# Patient Record
Sex: MALE | Race: BLACK OR AFRICAN AMERICAN | NOT HISPANIC OR LATINO | Employment: UNEMPLOYED | URBAN - METROPOLITAN AREA
[De-identification: names, ages, dates, MRNs, and addresses within clinical notes are randomized per-mention and may not be internally consistent; named-entity substitution may affect disease eponyms.]

---

## 2020-05-04 ENCOUNTER — HOSPITAL ENCOUNTER (INPATIENT)
Facility: HOSPITAL | Age: 60
LOS: 9 days | Discharge: HOME | DRG: 643 | End: 2020-05-13
Attending: EMERGENCY MEDICINE | Admitting: INTERNAL MEDICINE
Payer: MEDICARE

## 2020-05-04 DIAGNOSIS — R53.81 MALAISE: ICD-10-CM

## 2020-05-04 DIAGNOSIS — Z72.0 TOBACCO ABUSE: ICD-10-CM

## 2020-05-04 DIAGNOSIS — R00.1 BRADYCARDIA: ICD-10-CM

## 2020-05-04 DIAGNOSIS — F41.9 ANXIETY DISORDER, UNSPECIFIED TYPE: ICD-10-CM

## 2020-05-04 DIAGNOSIS — G93.41 ENCEPHALOPATHY, METABOLIC: ICD-10-CM

## 2020-05-04 DIAGNOSIS — R42 DIZZINESS: ICD-10-CM

## 2020-05-04 DIAGNOSIS — I10 HYPERTENSION, ESSENTIAL: ICD-10-CM

## 2020-05-04 DIAGNOSIS — R07.9 CHEST PAIN: ICD-10-CM

## 2020-05-04 DIAGNOSIS — F41.0 ANXIETY ATTACK: ICD-10-CM

## 2020-05-04 DIAGNOSIS — E87.1 HYPONATREMIA: Primary | ICD-10-CM

## 2020-05-04 LAB
ALBUMIN SERPL BCP-MCNC: 4.3 G/DL (ref 3.5–5.2)
ALP SERPL-CCNC: 95 U/L (ref 55–135)
ALT SERPL W/O P-5'-P-CCNC: 27 U/L (ref 10–44)
ANION GAP SERPL CALC-SCNC: 13 MMOL/L (ref 8–16)
AST SERPL-CCNC: 21 U/L (ref 10–40)
BASOPHILS # BLD AUTO: 0.05 K/UL (ref 0–0.2)
BASOPHILS NFR BLD: 0.5 % (ref 0–1.9)
BILIRUB SERPL-MCNC: 1.1 MG/DL (ref 0.1–1)
BILIRUB UR QL STRIP: NEGATIVE
BILIRUB UR QL STRIP: NEGATIVE
BNP SERPL-MCNC: 44 PG/ML (ref 0–99)
BUN SERPL-MCNC: 11 MG/DL (ref 6–20)
CALCIUM SERPL-MCNC: 9.3 MG/DL (ref 8.7–10.5)
CHLORIDE SERPL-SCNC: 88 MMOL/L (ref 95–110)
CLARITY UR: CLEAR
CLARITY UR: CLEAR
CO2 SERPL-SCNC: 20 MMOL/L (ref 23–29)
COLOR UR: YELLOW
COLOR UR: YELLOW
CREAT SERPL-MCNC: 1 MG/DL (ref 0.5–1.4)
D DIMER PPP IA.FEU-MCNC: 0.35 MG/L FEU
DIFFERENTIAL METHOD: ABNORMAL
EOSINOPHIL # BLD AUTO: 0.1 K/UL (ref 0–0.5)
EOSINOPHIL NFR BLD: 1.2 % (ref 0–8)
ERYTHROCYTE [DISTWIDTH] IN BLOOD BY AUTOMATED COUNT: 11.5 % (ref 11.5–14.5)
EST. GFR  (AFRICAN AMERICAN): >60 ML/MIN/1.73 M^2
EST. GFR  (NON AFRICAN AMERICAN): >60 ML/MIN/1.73 M^2
GLUCOSE SERPL-MCNC: 115 MG/DL (ref 70–110)
GLUCOSE UR QL STRIP: NEGATIVE
GLUCOSE UR QL STRIP: NEGATIVE
HCT VFR BLD AUTO: 41.4 % (ref 40–54)
HCV AB SERPL QL IA: NEGATIVE
HGB BLD-MCNC: 14.3 G/DL (ref 14–18)
HGB UR QL STRIP: ABNORMAL
HGB UR QL STRIP: ABNORMAL
HIV 1+2 AB+HIV1 P24 AG SERPL QL IA: NEGATIVE
IMM GRANULOCYTES # BLD AUTO: 0.04 K/UL (ref 0–0.04)
IMM GRANULOCYTES NFR BLD AUTO: 0.4 % (ref 0–0.5)
KETONES UR QL STRIP: ABNORMAL
KETONES UR QL STRIP: ABNORMAL
LEUKOCYTE ESTERASE UR QL STRIP: NEGATIVE
LEUKOCYTE ESTERASE UR QL STRIP: NEGATIVE
LIPASE SERPL-CCNC: 21 U/L (ref 4–60)
LYMPHOCYTES # BLD AUTO: 1.3 K/UL (ref 1–4.8)
LYMPHOCYTES NFR BLD: 11.8 % (ref 18–48)
MCH RBC QN AUTO: 30 PG (ref 27–31)
MCHC RBC AUTO-ENTMCNC: 34.5 G/DL (ref 32–36)
MCV RBC AUTO: 87 FL (ref 82–98)
MICROSCOPIC COMMENT: NORMAL
MONOCYTES # BLD AUTO: 0.5 K/UL (ref 0.3–1)
MONOCYTES NFR BLD: 4.7 % (ref 4–15)
NEUTROPHILS # BLD AUTO: 8.6 K/UL (ref 1.8–7.7)
NEUTROPHILS NFR BLD: 81.4 % (ref 38–73)
NITRITE UR QL STRIP: NEGATIVE
NITRITE UR QL STRIP: NEGATIVE
NRBC BLD-RTO: 0 /100 WBC
PH UR STRIP: 7 [PH] (ref 5–8)
PH UR STRIP: 7 [PH] (ref 5–8)
PLATELET # BLD AUTO: 271 K/UL (ref 150–350)
PMV BLD AUTO: 9.5 FL (ref 9.2–12.9)
POTASSIUM SERPL-SCNC: 4.4 MMOL/L (ref 3.5–5.1)
PROT SERPL-MCNC: 7.5 G/DL (ref 6–8.4)
PROT UR QL STRIP: NEGATIVE
PROT UR QL STRIP: NEGATIVE
RBC # BLD AUTO: 4.76 M/UL (ref 4.6–6.2)
RBC #/AREA URNS HPF: 0 /HPF (ref 0–4)
SARS-COV-2 RDRP RESP QL NAA+PROBE: NEGATIVE
SODIUM SERPL-SCNC: 121 MMOL/L (ref 136–145)
SP GR UR STRIP: 1.02 (ref 1–1.03)
SP GR UR STRIP: 1.02 (ref 1–1.03)
TROPONIN I SERPL DL<=0.01 NG/ML-MCNC: 0.01 NG/ML (ref 0–0.03)
TROPONIN I SERPL DL<=0.01 NG/ML-MCNC: <0.006 NG/ML (ref 0–0.03)
URN SPEC COLLECT METH UR: ABNORMAL
URN SPEC COLLECT METH UR: ABNORMAL
UROBILINOGEN UR STRIP-ACNC: 1 EU/DL
UROBILINOGEN UR STRIP-ACNC: NEGATIVE EU/DL
WBC # BLD AUTO: 10.61 K/UL (ref 3.9–12.7)

## 2020-05-04 PROCEDURE — 96374 THER/PROPH/DIAG INJ IV PUSH: CPT

## 2020-05-04 PROCEDURE — 85379 FIBRIN DEGRADATION QUANT: CPT

## 2020-05-04 PROCEDURE — 93010 EKG 12-LEAD: ICD-10-PCS | Mod: ,,, | Performed by: INTERNAL MEDICINE

## 2020-05-04 PROCEDURE — 81000 URINALYSIS NONAUTO W/SCOPE: CPT

## 2020-05-04 PROCEDURE — 99291 CRITICAL CARE FIRST HOUR: CPT | Mod: 25

## 2020-05-04 PROCEDURE — 36415 COLL VENOUS BLD VENIPUNCTURE: CPT

## 2020-05-04 PROCEDURE — 11000001 HC ACUTE MED/SURG PRIVATE ROOM

## 2020-05-04 PROCEDURE — 84484 ASSAY OF TROPONIN QUANT: CPT

## 2020-05-04 PROCEDURE — 83930 ASSAY OF BLOOD OSMOLALITY: CPT

## 2020-05-04 PROCEDURE — 25000003 PHARM REV CODE 250: Performed by: EMERGENCY MEDICINE

## 2020-05-04 PROCEDURE — 96376 TX/PRO/DX INJ SAME DRUG ADON: CPT

## 2020-05-04 PROCEDURE — 83935 ASSAY OF URINE OSMOLALITY: CPT

## 2020-05-04 PROCEDURE — 80053 COMPREHEN METABOLIC PANEL: CPT

## 2020-05-04 PROCEDURE — 96375 TX/PRO/DX INJ NEW DRUG ADDON: CPT

## 2020-05-04 PROCEDURE — 96361 HYDRATE IV INFUSION ADD-ON: CPT

## 2020-05-04 PROCEDURE — 81003 URINALYSIS AUTO W/O SCOPE: CPT

## 2020-05-04 PROCEDURE — 93010 ELECTROCARDIOGRAM REPORT: CPT | Mod: ,,, | Performed by: INTERNAL MEDICINE

## 2020-05-04 PROCEDURE — 85025 COMPLETE CBC W/AUTO DIFF WBC: CPT

## 2020-05-04 PROCEDURE — 93005 ELECTROCARDIOGRAM TRACING: CPT

## 2020-05-04 PROCEDURE — U0002 COVID-19 LAB TEST NON-CDC: HCPCS

## 2020-05-04 PROCEDURE — 63600175 PHARM REV CODE 636 W HCPCS: Performed by: EMERGENCY MEDICINE

## 2020-05-04 PROCEDURE — 83880 ASSAY OF NATRIURETIC PEPTIDE: CPT

## 2020-05-04 PROCEDURE — 86803 HEPATITIS C AB TEST: CPT

## 2020-05-04 PROCEDURE — 86703 HIV-1/HIV-2 1 RESULT ANTBDY: CPT

## 2020-05-04 PROCEDURE — 80048 BASIC METABOLIC PNL TOTAL CA: CPT

## 2020-05-04 PROCEDURE — 83690 ASSAY OF LIPASE: CPT

## 2020-05-04 PROCEDURE — 84300 ASSAY OF URINE SODIUM: CPT

## 2020-05-04 RX ORDER — CARVEDILOL 12.5 MG/1
25 TABLET ORAL 2 TIMES DAILY
Status: DISCONTINUED | OUTPATIENT
Start: 2020-05-04 | End: 2020-05-13 | Stop reason: HOSPADM

## 2020-05-04 RX ORDER — FLUOXETINE HYDROCHLORIDE 20 MG/1
20 CAPSULE ORAL DAILY
Status: ON HOLD | COMMUNITY
End: 2020-05-13 | Stop reason: HOSPADM

## 2020-05-04 RX ORDER — CARVEDILOL 12.5 MG/1
25 TABLET ORAL 2 TIMES DAILY WITH MEALS
Status: DISCONTINUED | OUTPATIENT
Start: 2020-05-05 | End: 2020-05-04

## 2020-05-04 RX ORDER — BACLOFEN 10 MG/1
10 TABLET ORAL ONCE
Status: COMPLETED | OUTPATIENT
Start: 2020-05-04 | End: 2020-05-04

## 2020-05-04 RX ORDER — HYDRALAZINE HYDROCHLORIDE 20 MG/ML
10 INJECTION INTRAMUSCULAR; INTRAVENOUS
Status: COMPLETED | OUTPATIENT
Start: 2020-05-04 | End: 2020-05-04

## 2020-05-04 RX ORDER — HYDRALAZINE HYDROCHLORIDE 20 MG/ML
10 INJECTION INTRAMUSCULAR; INTRAVENOUS EVERY 6 HOURS PRN
Status: DISCONTINUED | OUTPATIENT
Start: 2020-05-05 | End: 2020-05-07

## 2020-05-04 RX ORDER — VERAPAMIL HYDROCHLORIDE 180 MG/1
180 CAPSULE, EXTENDED RELEASE ORAL DAILY
COMMUNITY

## 2020-05-04 RX ORDER — DIPHENHYDRAMINE HCL 25 MG
25 CAPSULE ORAL EVERY 6 HOURS PRN
Status: DISCONTINUED | OUTPATIENT
Start: 2020-05-05 | End: 2020-05-13 | Stop reason: HOSPADM

## 2020-05-04 RX ORDER — OMEPRAZOLE 20 MG/1
20 CAPSULE, DELAYED RELEASE ORAL DAILY
COMMUNITY

## 2020-05-04 RX ORDER — NAPROXEN SODIUM 220 MG/1
81 TABLET, FILM COATED ORAL DAILY
COMMUNITY

## 2020-05-04 RX ORDER — ASPIRIN 325 MG
325 TABLET ORAL
Status: DISCONTINUED | OUTPATIENT
Start: 2020-05-04 | End: 2020-05-04

## 2020-05-04 RX ORDER — SULFAMETHOXAZOLE AND TRIMETHOPRIM 800; 160 MG/1; MG/1
1 TABLET ORAL
Status: ON HOLD | COMMUNITY
End: 2020-05-13 | Stop reason: HOSPADM

## 2020-05-04 RX ORDER — NAPROXEN SODIUM 220 MG/1
81 TABLET, FILM COATED ORAL DAILY
Status: DISCONTINUED | OUTPATIENT
Start: 2020-05-05 | End: 2020-05-13 | Stop reason: HOSPADM

## 2020-05-04 RX ORDER — ACETAMINOPHEN 325 MG/1
650 TABLET ORAL EVERY 6 HOURS PRN
Status: DISCONTINUED | OUTPATIENT
Start: 2020-05-05 | End: 2020-05-13 | Stop reason: HOSPADM

## 2020-05-04 RX ORDER — LABETALOL HYDROCHLORIDE 5 MG/ML
10 INJECTION, SOLUTION INTRAVENOUS
Status: COMPLETED | OUTPATIENT
Start: 2020-05-04 | End: 2020-05-04

## 2020-05-04 RX ORDER — GUAIFENESIN 100 MG/5ML
200 SOLUTION ORAL EVERY 4 HOURS PRN
Status: DISCONTINUED | OUTPATIENT
Start: 2020-05-05 | End: 2020-05-13 | Stop reason: HOSPADM

## 2020-05-04 RX ORDER — IPRATROPIUM BROMIDE AND ALBUTEROL SULFATE 2.5; .5 MG/3ML; MG/3ML
3 SOLUTION RESPIRATORY (INHALATION) EVERY 4 HOURS PRN
Status: DISCONTINUED | OUTPATIENT
Start: 2020-05-05 | End: 2020-05-13 | Stop reason: HOSPADM

## 2020-05-04 RX ORDER — SODIUM CHLORIDE 9 MG/ML
INJECTION, SOLUTION INTRAVENOUS ONCE
Status: COMPLETED | OUTPATIENT
Start: 2020-05-04 | End: 2020-05-05

## 2020-05-04 RX ORDER — KETOROLAC TROMETHAMINE 30 MG/ML
30 INJECTION, SOLUTION INTRAMUSCULAR; INTRAVENOUS
Status: DISCONTINUED | OUTPATIENT
Start: 2020-05-04 | End: 2020-05-04

## 2020-05-04 RX ORDER — RIFAMPIN 300 MG/1
10 CAPSULE ORAL DAILY
Status: ON HOLD | COMMUNITY
End: 2020-05-13 | Stop reason: HOSPADM

## 2020-05-04 RX ORDER — ACETAMINOPHEN 325 MG/1
650 TABLET ORAL
Status: COMPLETED | OUTPATIENT
Start: 2020-05-04 | End: 2020-05-04

## 2020-05-04 RX ORDER — ONDANSETRON 2 MG/ML
4 INJECTION INTRAMUSCULAR; INTRAVENOUS EVERY 8 HOURS PRN
Status: DISCONTINUED | OUTPATIENT
Start: 2020-05-05 | End: 2020-05-13 | Stop reason: HOSPADM

## 2020-05-04 RX ORDER — ALPRAZOLAM 1 MG/1
1 TABLET ORAL 3 TIMES DAILY PRN
COMMUNITY

## 2020-05-04 RX ORDER — CYCLOBENZAPRINE HCL 10 MG
10 TABLET ORAL 3 TIMES DAILY PRN
Status: ON HOLD | COMMUNITY
End: 2020-05-13 | Stop reason: HOSPADM

## 2020-05-04 RX ORDER — AMITRIPTYLINE HYDROCHLORIDE 25 MG/1
25 TABLET, FILM COATED ORAL NIGHTLY PRN
Status: DISCONTINUED | OUTPATIENT
Start: 2020-05-05 | End: 2020-05-05

## 2020-05-04 RX ORDER — HYDRALAZINE HYDROCHLORIDE 20 MG/ML
10 INJECTION INTRAMUSCULAR; INTRAVENOUS
Status: DISCONTINUED | OUTPATIENT
Start: 2020-05-04 | End: 2020-05-04

## 2020-05-04 RX ORDER — AMITRIPTYLINE HYDROCHLORIDE 25 MG/1
25 TABLET, FILM COATED ORAL NIGHTLY PRN
Status: ON HOLD | COMMUNITY
End: 2020-05-13 | Stop reason: HOSPADM

## 2020-05-04 RX ORDER — MAG HYDROX/ALUMINUM HYD/SIMETH 200-200-20
30 SUSPENSION, ORAL (FINAL DOSE FORM) ORAL EVERY 6 HOURS PRN
Status: DISCONTINUED | OUTPATIENT
Start: 2020-05-05 | End: 2020-05-07 | Stop reason: SDUPTHER

## 2020-05-04 RX ORDER — PANTOPRAZOLE SODIUM 40 MG/1
40 TABLET, DELAYED RELEASE ORAL DAILY
Status: DISCONTINUED | OUTPATIENT
Start: 2020-05-05 | End: 2020-05-13 | Stop reason: HOSPADM

## 2020-05-04 RX ORDER — IBUPROFEN 200 MG
1 TABLET ORAL DAILY
Status: DISCONTINUED | OUTPATIENT
Start: 2020-05-05 | End: 2020-05-13 | Stop reason: HOSPADM

## 2020-05-04 RX ORDER — CARVEDILOL 25 MG/1
25 TABLET ORAL 2 TIMES DAILY WITH MEALS
COMMUNITY

## 2020-05-04 RX ORDER — SIMVASTATIN 20 MG/1
40 TABLET, FILM COATED ORAL NIGHTLY
Status: DISCONTINUED | OUTPATIENT
Start: 2020-05-05 | End: 2020-05-13 | Stop reason: HOSPADM

## 2020-05-04 RX ORDER — SIMVASTATIN 40 MG/1
40 TABLET, FILM COATED ORAL NIGHTLY
Status: ON HOLD | COMMUNITY
End: 2020-05-13 | Stop reason: HOSPADM

## 2020-05-04 RX ORDER — VERAPAMIL HYDROCHLORIDE 180 MG/1
180 TABLET, FILM COATED, EXTENDED RELEASE ORAL DAILY
Status: DISCONTINUED | OUTPATIENT
Start: 2020-05-05 | End: 2020-05-05

## 2020-05-04 RX ADMIN — BACLOFEN 10 MG: 10 TABLET ORAL at 07:05

## 2020-05-04 RX ADMIN — ACETAMINOPHEN 650 MG: 325 TABLET ORAL at 06:05

## 2020-05-04 RX ADMIN — LABETALOL HYDROCHLORIDE 10 MG: 5 INJECTION, SOLUTION INTRAVENOUS at 09:05

## 2020-05-04 RX ADMIN — LORAZEPAM 0.5 MG: 2 INJECTION INTRAMUSCULAR; INTRAVENOUS at 05:05

## 2020-05-04 RX ADMIN — SODIUM CHLORIDE 1000 ML: 0.9 INJECTION, SOLUTION INTRAVENOUS at 04:05

## 2020-05-04 RX ADMIN — HYDRALAZINE HYDROCHLORIDE 10 MG: 20 INJECTION INTRAMUSCULAR; INTRAVENOUS at 07:05

## 2020-05-04 NOTE — ED PROVIDER NOTES
"   History     Chief Complaint   Patient presents with    Chest Pain     substernal chest tightness. given asa 325 and 2 sl nitros in route w/ no improvement. per ems pt appears anxious.        Review of patient's allergies indicates:   Allergen Reactions    Codeine     Penicillins        History of Present Illness   HPI    5/4/2020, 3:05 PM  The history is provided by the patient    Juwan Pozo is a 59 y.o. male presenting to the ED for  Chest pain.  According to [facility] ambulance service, patient  Was given aspirin 325 tablet and nitroglycerin without relief.  Patient is some a anxious appearing.   Patient is very vague about symptoms.  He repeatedly  questioning why the the area around his lips on numb and why his tongue is "moving".  He gets around quite a bit on his history.  He talks about having an abscess that was to his right axilla that moved to his last.  He describes being on the 2 different antibiotics.  He notes that his blood pressure was labile  Last night.  He reports that his body felt warm/.  His primary care physician had taken muscle of lisinopril 5 days earlier because blood pressure was "good".  He states that last night he started not feeling well.  He felt like there was some sort pressure in his chest.  He is unable to tell me despite questioning how long the pressure last.  When I asked about the chest pressure, he started talking about how he had prior orthopedic surgeries.  Patient denies known history of coronary artery disease, diabetes.   He is treated for high blood pressure and hyperlipidemia.  He states that he has been feeling lightheaded -but he denies any vomiting, melena, hematochezia, pain in the calf, numbness or weakness to 1 side, loss of vision, or slurred speech.  He does note that he feels numb and tingling around his mouth.  Patient had been treated for anxiety -not like this.  Patient is on fluoxetine 20 mg and Xanax.  Xanax approximately 130 pm today.  Unknown " what makes the symptoms better or worse.          Arrival mode:  [facility]I    PCP: Primary Doctor No     Allergies:  Review of patient's allergies indicates:   Allergen Reactions    Codeine     Penicillins        Past Medical History:  Past Medical History:   Diagnosis Date    Depression     High cholesterol     Hypertension        Past Surgical History:  Past Surgical History:   Procedure Laterality Date    APPENDECTOMY      arm surgery      KNEE SURGERY           Family History:  Family History   Problem Relation Age of Onset    Hypertension Mother        Social History:  Social History     Tobacco Use    Smoking status: Current Every Day Smoker     Packs/day: 1.00     Types: Cigarettes   Substance and Sexual Activity    Alcohol use: Not Currently    Drug use: Not Currently    Sexual activity: Unknown        Review of Systems   Review of Systems   Constitutional: Negative for fever.   HENT: Negative for sore throat.    Respiratory: Negative for shortness of breath.    Cardiovascular: Negative for chest pain.   Gastrointestinal: Negative for nausea.   Genitourinary: Negative for dysuria.   Musculoskeletal: Negative for back pain.   Skin: Negative for rash.   Neurological: Positive for dizziness and numbness (Perioral, bilaterally). Negative for facial asymmetry, speech difficulty, weakness and headaches.   Hematological: Does not bruise/bleed easily.   Psychiatric/Behavioral: Positive for decreased concentration and sleep disturbance (Last night). Negative for confusion.          Physical Exam     Initial Vitals   BP Pulse Resp Temp SpO2   05/04/20 1441 05/04/20 1441 05/04/20 1441 05/04/20 1513 05/04/20 1441   (!) 180/74 80 20 99.2 °F (37.3 °C) 100 %      MAP       --                 Physical Exam    Nursing Notes and Vital Signs Reviewed.  Constitutional: Patient is in mild distress. Well-developed and well-nourished. Patient is reclining in the [facility].  He appears slightly anxious. He sighs a  lot.  He is slow to answer questions.  His conversations will go tangentially.  Head: Atraumatic. Normocephalic.  Eyes: PERRL. EOM intact. Conjunctivae are not pale. No scleral icterus.  ENT: Mucous membranes are dry. Oropharynx is clear and symmetric.    No cyanosis.  Neck: Supple. Full ROM. No lymphadenopathy.  Cardiovascular: Regular rate. Regular rhythm. No murmurs, rubs, or gallops. Distal pulses are 2+ and symmetric.  Pulmonary/Chest: No respiratory distress. Clear to auscultation bilaterally. No wheezing or rales.  Abdominal: Soft and non-distended.  There is no tenderness.  No rebound, guarding, or rigidity. Good bowel sounds.  Genitourinary: No CVA tenderness  Musculoskeletal: Moves all extremities. No obvious deformities. No edema. No calf tenderness.  Skin: Warm and dry.  Neurological:  Alert, awake, and appropriate.  Normal speech.  No acute focal neurological deficits are appreciated.  Cranial nerves 2-12 are intact.  Finger-to-nose intact.  Psychiatric: Normal affect. Good eye contact. Appropriate in content.     ED Course     Critical Care  Date/Time: 5/4/2020 10:48 PM  Performed by: Jessica Phipps DO  Authorized by: Jessica Phipps DO   Direct patient critical care time: 15 minutes  Additional history critical care time: 5 minutes  Ordering / reviewing critical care time: 5 minutes  Documentation critical care time: 5 minutes  Consulting other physicians critical care time: 5 minutes  Consult with family critical care time: 5 minutes  Total critical care time (exclusive of procedural time) : 40 minutes  Critical care time was exclusive of separately billable procedures and treating other patients and teaching time.  Critical care was necessary to treat or prevent imminent or life-threatening deterioration of the following conditions: High blood pressure and electrolyte abnormality.  Critical care was time spent personally by me on the following activities: blood draw for specimens, development  "of treatment plan with patient or surrogate, discussions with consultants, discussions with primary provider, interpretation of cardiac output measurements, evaluation of patient's response to treatment, examination of patient, obtaining history from patient or surrogate, ordering and performing treatments and interventions, ordering and review of laboratory studies, ordering and review of radiographic studies, pulse oximetry, re-evaluation of patient's condition and review of old charts.  Subsequent provider of critical care: I assumed direction of critical care for this patient from another provider of my specialty.          ED Vital Signs:  Vitals:    05/04/20 1441 05/04/20 1446 05/04/20 1513 05/04/20 1539   BP: (!) 180/74      Pulse: 80 73     Resp: 20      Temp:   99.2 °F (37.3 °C)    TempSrc: Oral  Oral    SpO2: 100%      Weight:    85.6 kg (188 lb 11.4 oz)   Height:    6' 1" (1.854 m)    05/04/20 1712 05/04/20 1830 05/04/20 1901 05/04/20 2030   BP: (!) 194/92 (!) 189/93 (!) 195/97 (!) 188/92   Pulse: 83 88  96   Resp: 18 18  16   Temp: 98.7 °F (37.1 °C)      TempSrc:       SpO2:  100%  100%   Weight:       Height:        05/04/20 2100 05/04/20 2200 05/04/20 2330   BP: (!) 182/94 (!) 176/93 (!) 183/93   Pulse: 96 88 88   Resp: 16 10 16   Temp:      TempSrc:      SpO2: 97% 97% 97%   Weight:      Height:          Abnormal Lab Results:  Labs Reviewed   CBC W/ AUTO DIFFERENTIAL - Abnormal; Notable for the following components:       Result Value    Gran # (ANC) 8.6 (*)     Gran% 81.4 (*)     Lymph% 11.8 (*)     All other components within normal limits   COMPREHENSIVE METABOLIC PANEL - Abnormal; Notable for the following components:    Sodium 121 (*)     Chloride 88 (*)     CO2 20 (*)     Glucose 115 (*)     Total Bilirubin 1.1 (*)     All other components within normal limits   URINALYSIS, REFLEX TO URINE CULTURE - Abnormal; Notable for the following components:    Ketones, UA 1+ (*)     Occult Blood UA 1+ (*)  "    All other components within normal limits    Narrative:     Preferred Collection Type->Urine, Clean Catch   HIV 1 / 2 ANTIBODY   HEPATITIS C ANTIBODY   TROPONIN I   B-TYPE NATRIURETIC PEPTIDE   D DIMER, QUANTITATIVE   LIPASE   SARS-COV-2 RNA AMPLIFICATION, QUAL   TROPONIN I   URINALYSIS MICROSCOPIC    Narrative:     Preferred Collection Type->Urine, Clean Catch   URINALYSIS, REFLEX TO URINE CULTURE   SODIUM, URINE, RANDOM   OSMOLALITY, SERUM   OSMOLALITY, URINE RANDOM   BASIC METABOLIC PANEL        All Lab Results:  Results for orders placed or performed during the hospital encounter of 05/04/20   HIV 1/2 Ag/Ab (4th Gen)   Result Value Ref Range    HIV 1/2 Ag/Ab Negative Negative   Hepatitis C antibody   Result Value Ref Range    Hepatitis C Ab Negative Negative   CBC auto differential   Result Value Ref Range    WBC 10.61 3.90 - 12.70 K/uL    RBC 4.76 4.60 - 6.20 M/uL    Hemoglobin 14.3 14.0 - 18.0 g/dL    Hematocrit 41.4 40.0 - 54.0 %    Mean Corpuscular Volume 87 82 - 98 fL    Mean Corpuscular Hemoglobin 30.0 27.0 - 31.0 pg    Mean Corpuscular Hemoglobin Conc 34.5 32.0 - 36.0 g/dL    RDW 11.5 11.5 - 14.5 %    Platelets 271 150 - 350 K/uL    MPV 9.5 9.2 - 12.9 fL    Immature Granulocytes 0.4 0.0 - 0.5 %    Gran # (ANC) 8.6 (H) 1.8 - 7.7 K/uL    Immature Grans (Abs) 0.04 0.00 - 0.04 K/uL    Lymph # 1.3 1.0 - 4.8 K/uL    Mono # 0.5 0.3 - 1.0 K/uL    Eos # 0.1 0.0 - 0.5 K/uL    Baso # 0.05 0.00 - 0.20 K/uL    nRBC 0 0 /100 WBC    Gran% 81.4 (H) 38.0 - 73.0 %    Lymph% 11.8 (L) 18.0 - 48.0 %    Mono% 4.7 4.0 - 15.0 %    Eosinophil% 1.2 0.0 - 8.0 %    Basophil% 0.5 0.0 - 1.9 %    Differential Method Automated    Comprehensive metabolic panel   Result Value Ref Range    Sodium 121 (L) 136 - 145 mmol/L    Potassium 4.4 3.5 - 5.1 mmol/L    Chloride 88 (L) 95 - 110 mmol/L    CO2 20 (L) 23 - 29 mmol/L    Glucose 115 (H) 70 - 110 mg/dL    BUN, Bld 11 6 - 20 mg/dL    Creatinine 1.0 0.5 - 1.4 mg/dL    Calcium 9.3 8.7 - 10.5  mg/dL    Total Protein 7.5 6.0 - 8.4 g/dL    Albumin 4.3 3.5 - 5.2 g/dL    Total Bilirubin 1.1 (H) 0.1 - 1.0 mg/dL    Alkaline Phosphatase 95 55 - 135 U/L    AST 21 10 - 40 U/L    ALT 27 10 - 44 U/L    Anion Gap 13 8 - 16 mmol/L    eGFR if African American >60 >60 mL/min/1.73 m^2    eGFR if non African American >60 >60 mL/min/1.73 m^2   Troponin I #1   Result Value Ref Range    Troponin I 0.006 0.000 - 0.026 ng/mL   B-Type natriuretic peptide (BNP)   Result Value Ref Range    BNP 44 0 - 99 pg/mL   D dimer, quantitative   Result Value Ref Range    D-Dimer 0.35 <0.50 mg/L FEU   Lipase   Result Value Ref Range    Lipase 21 4 - 60 U/L   COVID-19 Routine Screening   Result Value Ref Range    SARS-CoV-2 RNA, Amplification, Qual Negative Negative   Urinalysis, Reflex to Urine Culture Urine, Clean Catch   Result Value Ref Range    Specimen UA Urine, Clean Catch     Color, UA Yellow Yellow, Straw, Lainey    Appearance, UA Clear Clear    pH, UA 7.0 5.0 - 8.0    Specific Gravity, UA 1.020 1.005 - 1.030    Protein, UA Negative Negative    Glucose, UA Negative Negative    Ketones, UA 1+ (A) Negative    Bilirubin (UA) Negative Negative    Occult Blood UA 1+ (A) Negative    Nitrite, UA Negative Negative    Urobilinogen, UA 1.0 <2.0 EU/dL    Leukocytes, UA Negative Negative   Troponin I #2   Result Value Ref Range    Troponin I <0.006 0.000 - 0.026 ng/mL   Urinalysis Microscopic   Result Value Ref Range    RBC, UA 0 0 - 4 /hpf    Microscopic Comment SEE COMMENT          The EKG was ordered, reviewed, and independently interpreted by the ED provider.    Rate is 75 beats per minute.  Normal axis.  Sinus rhythm.  No acute ST or T-wave changes appreciated.          Imaging Results:  Imaging Results          CT Head Without Contrast (Final result)  Result time 05/04/20 18:10:27    Final result by Calvin Bourgeois MD (05/04/20 18:10:27)                 Impression:      No acute findings.    All CT scans at this facility are  performed  using dose modulation techniques as appropriate to performed exam including the following:  automated exposure control; adjustment of mA and/or kV according to the patients size (this includes techniques or standardized protocols for targeted exams where dose is matched to indication/reason for exam: i.e. extremities or head);  iterative reconstruction technique.      Electronically signed by: Calvin Bourgeois  Date:    05/04/2020  Time:    18:10             Narrative:    EXAMINATION:  CT HEAD WITHOUT CONTRAST    CLINICAL HISTORY:  Dizziness;.    TECHNIQUE:  Low dose axial images were obtained through the head.  Coronal and sagittal reformations were also performed. Contrast was not administered.    COMPARISON:  None.    FINDINGS:  Midline structures are intact. Ventricles are of normal size and configuration. Brain parenchyma is normal with normal differentiation of the gray-white matter.    No intracranial hemorrhage,acute infarct, or suspicious intracranial lesion is identified.    Skull base and calvarium are normal. Visualized sinuses and mastoid air cells are clear.                               X-Ray Chest AP Portable (Final result)  Result time 05/04/20 15:26:29    Final result by Kal Arreaga MD (05/04/20 15:26:29)                 Impression:      No acute findings.      Electronically signed by: Kal Arreaga MD  Date:    05/04/2020  Time:    15:26             Narrative:    EXAMINATION:  XR CHEST AP PORTABLE    CLINICAL HISTORY:  Chest Pain;    TECHNIQUE:  Single frontal view of the chest was performed.    COMPARISON:  None    FINDINGS:  The cardiomediastinal silhouette is normal.    The lungs are clear.  No pleural effusions.    Multilevel spondylosis in the spine.                                 The Emergency Provider reviewed the vital signs and test results, which are outlined above.     ED Discussion     ED Course as of May 04 3684   Mon May 04, 2020   3492  Patient reports that he is  feeling weird still.  He states that he feels numb around the lips.  Is wondering discussed DVT in his leg because his legs hurt.  Of note, his sodium is low.  Although patient is presenting similar to anxiety attack, can not exclude hyponatremia as a cause of his symptoms.    [LB]      ED Course User Index  [LB] Jessica BEAVER DO Desire       10:45 PM: Discussed case with Omar Alatorre MD ([facility]). Dr. Omar Alatorre agrees with current care and management of pt and accepts admission.   Admitting Service: Hospital Medicine  Admitting Physician: Dr. Omar Alatorre  Admit to: Med/Surg             ED Medication(s):  Medications   carvediloL tablet 25 mg (25 mg Oral Not Given 5/4/20 2200)   hydrALAZINE injection 10 mg (has no administration in time range)   acetaminophen tablet 650 mg (has no administration in time range)   ondansetron injection 4 mg (has no administration in time range)   nicotine 21 mg/24 hr 1 patch (has no administration in time range)   diphenhydrAMINE capsule 25 mg (has no administration in time range)   guaifenesin 100 mg/5 ml syrup 200 mg (has no administration in time range)   aluminum-magnesium hydroxide-simethicone 200-200-20 mg/5 mL suspension 30 mL (has no administration in time range)   albuterol-ipratropium 2.5 mg-0.5 mg/3 mL nebulizer solution 3 mL (has no administration in time range)   lorazepam (ATIVAN) injection 1 mg (has no administration in time range)   0.9%  NaCl infusion (has no administration in time range)   amitriptyline tablet 25 mg (has no administration in time range)   aspirin chewable tablet 81 mg (has no administration in time range)   pantoprazole EC tablet 40 mg (has no administration in time range)   simvastatin tablet 40 mg (has no administration in time range)   verapamiL CR tablet 180 mg (has no administration in time range)   sodium chloride 0.9% bolus 1,000 mL (0 mLs Intravenous Stopped 5/4/20 1904)   lorazepam (ATIVAN) injection 0.5 mg (0.5 mg Intravenous  "Given 5/4/20 1708)   hydrALAZINE injection 10 mg (10 mg Intravenous Given 5/4/20 1901)   acetaminophen tablet 650 mg (650 mg Oral Given 5/4/20 1856)   baclofen tablet 10 mg (10 mg Oral Given 5/4/20 1957)   labetaloL injection 10 mg (10 mg Intravenous Given 5/4/20 2119)          Medication List      ASK your doctor about these medications    ALPRAZolam 1 MG tablet  Commonly known as:  XANAX     amitriptyline 25 MG tablet  Commonly known as:  ELAVIL     aspirin 81 MG Chew     carvediloL 25 MG tablet  Commonly known as:  COREG     cyclobenzaprine 10 MG tablet  Commonly known as:  FLEXERIL     FLUoxetine 20 MG capsule     omeprazole 20 MG capsule  Commonly known as:  PRILOSEC     rifAMpin 300 MG capsule  Commonly known as:  RIFADIN     simvastatin 40 MG tablet  Commonly known as:  ZOCOR     sulfamethoxazole-trimethoprim 800-160mg 800-160 mg Tab  Commonly known as:  BACTRIM DS     verapamiL 180 MG C24p  Commonly known as:  VERELAN                     MIPS Measures     Smoker?  Yes     Hypertension: History of Hypertension: The patient has elevated blood pressure (higher than 120/80) while being treated in the ED but has a history of hypertension.         Medical Decision Making     Medical Decision Making:   Clinical Tests:   Lab Tests: Ordered and Reviewed  Radiological Study: Ordered and Reviewed  Medical Tests: Ordered and Reviewed             MDM  Reviewed: vitals and nursing note          Portions of this note may have been created with voice recognition software. Occasional "wrong-word" or "sound-a-like" substitutions may have occurred due to the inherent limitations of voice recognition software. Please, read the note carefully and recognize, using context, where substitutions have occurred.         National State of Emergency Declared secondary to COVID-19.     Clinical Impression       ICD-10-CM ICD-9-CM   1. Hyponatremia E87.1 276.1   2. Chest pain R07.9 786.50   3. Dizziness R42 780.4   4. Anxiety attack F41.0 " 300.01   5. Malaise R53.81 780.79   6. Tobacco abuse Z72.0 305.1       Disposition:   Disposition: Admitted  Condition: Ligia Phipps,   05/04/20 1638

## 2020-05-05 PROBLEM — I10 HYPERTENSION, ESSENTIAL: Status: ACTIVE | Noted: 2020-05-05

## 2020-05-05 LAB
ANION GAP SERPL CALC-SCNC: 11 MMOL/L (ref 8–16)
ANION GAP SERPL CALC-SCNC: 11 MMOL/L (ref 8–16)
ANION GAP SERPL CALC-SCNC: 12 MMOL/L (ref 8–16)
ANION GAP SERPL CALC-SCNC: 12 MMOL/L (ref 8–16)
BASOPHILS # BLD AUTO: 0.05 K/UL (ref 0–0.2)
BASOPHILS NFR BLD: 0.3 % (ref 0–1.9)
BUN SERPL-MCNC: 10 MG/DL (ref 6–20)
BUN SERPL-MCNC: 8 MG/DL (ref 6–20)
BUN SERPL-MCNC: 9 MG/DL (ref 6–20)
BUN SERPL-MCNC: 9 MG/DL (ref 6–20)
CALCIUM SERPL-MCNC: 9 MG/DL (ref 8.7–10.5)
CALCIUM SERPL-MCNC: 9.3 MG/DL (ref 8.7–10.5)
CHLORIDE SERPL-SCNC: 83 MMOL/L (ref 95–110)
CHLORIDE SERPL-SCNC: 88 MMOL/L (ref 95–110)
CHLORIDE SERPL-SCNC: 89 MMOL/L (ref 95–110)
CHLORIDE SERPL-SCNC: 89 MMOL/L (ref 95–110)
CO2 SERPL-SCNC: 16 MMOL/L (ref 23–29)
CO2 SERPL-SCNC: 17 MMOL/L (ref 23–29)
CO2 SERPL-SCNC: 19 MMOL/L (ref 23–29)
CO2 SERPL-SCNC: 21 MMOL/L (ref 23–29)
COMPLEXED PSA SERPL-MCNC: 0.49 NG/ML (ref 0–4)
CREAT SERPL-MCNC: 0.8 MG/DL (ref 0.5–1.4)
DIFFERENTIAL METHOD: ABNORMAL
EOSINOPHIL # BLD AUTO: 0.1 K/UL (ref 0–0.5)
EOSINOPHIL NFR BLD: 0.6 % (ref 0–8)
ERYTHROCYTE [DISTWIDTH] IN BLOOD BY AUTOMATED COUNT: 11.8 % (ref 11.5–14.5)
EST. GFR  (AFRICAN AMERICAN): >60 ML/MIN/1.73 M^2
EST. GFR  (NON AFRICAN AMERICAN): >60 ML/MIN/1.73 M^2
GLUCOSE SERPL-MCNC: 104 MG/DL (ref 70–110)
GLUCOSE SERPL-MCNC: 108 MG/DL (ref 70–110)
GLUCOSE SERPL-MCNC: 114 MG/DL (ref 70–110)
GLUCOSE SERPL-MCNC: 89 MG/DL (ref 70–110)
HCT VFR BLD AUTO: 43 % (ref 40–54)
HGB BLD-MCNC: 14.7 G/DL (ref 14–18)
IMM GRANULOCYTES # BLD AUTO: 0.07 K/UL (ref 0–0.04)
IMM GRANULOCYTES NFR BLD AUTO: 0.5 % (ref 0–0.5)
LYMPHOCYTES # BLD AUTO: 1.6 K/UL (ref 1–4.8)
LYMPHOCYTES NFR BLD: 11.3 % (ref 18–48)
MAGNESIUM SERPL-MCNC: 1.4 MG/DL (ref 1.6–2.6)
MCH RBC QN AUTO: 29.9 PG (ref 27–31)
MCHC RBC AUTO-ENTMCNC: 34.2 G/DL (ref 32–36)
MCV RBC AUTO: 87 FL (ref 82–98)
MONOCYTES # BLD AUTO: 1.5 K/UL (ref 0.3–1)
MONOCYTES NFR BLD: 10.2 % (ref 4–15)
NEUTROPHILS # BLD AUTO: 11.1 K/UL (ref 1.8–7.7)
NEUTROPHILS NFR BLD: 77.1 % (ref 38–73)
NRBC BLD-RTO: 0 /100 WBC
OSMOLALITY SERPL: 244 MOSM/KG (ref 280–300)
OSMOLALITY UR: 560 MOSM/KG (ref 50–1200)
PHOSPHATE SERPL-MCNC: 3.3 MG/DL (ref 2.7–4.5)
PLATELET # BLD AUTO: 294 K/UL (ref 150–350)
PMV BLD AUTO: 9.8 FL (ref 9.2–12.9)
POTASSIUM SERPL-SCNC: 3.7 MMOL/L (ref 3.5–5.1)
POTASSIUM SERPL-SCNC: 3.9 MMOL/L (ref 3.5–5.1)
POTASSIUM SERPL-SCNC: 4.2 MMOL/L (ref 3.5–5.1)
POTASSIUM SERPL-SCNC: 5.6 MMOL/L (ref 3.5–5.1)
RBC # BLD AUTO: 4.92 M/UL (ref 4.6–6.2)
SODIUM SERPL-SCNC: 115 MMOL/L (ref 136–145)
SODIUM SERPL-SCNC: 116 MMOL/L (ref 136–145)
SODIUM SERPL-SCNC: 116 MMOL/L (ref 136–145)
SODIUM SERPL-SCNC: 117 MMOL/L (ref 136–145)
SODIUM SERPL-SCNC: 120 MMOL/L (ref 136–145)
SODIUM UR-SCNC: 132 MMOL/L (ref 20–250)
TSH SERPL DL<=0.005 MIU/L-ACNC: 0.88 UIU/ML (ref 0.4–4)
URATE SERPL-MCNC: 3.3 MG/DL (ref 3.4–7)
WBC # BLD AUTO: 14.37 K/UL (ref 3.9–12.7)

## 2020-05-05 PROCEDURE — 36415 COLL VENOUS BLD VENIPUNCTURE: CPT

## 2020-05-05 PROCEDURE — 25000242 PHARM REV CODE 250 ALT 637 W/ HCPCS: Performed by: FAMILY MEDICINE

## 2020-05-05 PROCEDURE — 84100 ASSAY OF PHOSPHORUS: CPT

## 2020-05-05 PROCEDURE — 80048 BASIC METABOLIC PNL TOTAL CA: CPT

## 2020-05-05 PROCEDURE — 82024 ASSAY OF ACTH: CPT

## 2020-05-05 PROCEDURE — 51798 US URINE CAPACITY MEASURE: CPT

## 2020-05-05 PROCEDURE — 25000003 PHARM REV CODE 250: Performed by: INTERNAL MEDICINE

## 2020-05-05 PROCEDURE — 99233 PR SUBSEQUENT HOSPITAL CARE,LEVL III: ICD-10-PCS | Mod: ,,, | Performed by: INTERNAL MEDICINE

## 2020-05-05 PROCEDURE — 25000003 PHARM REV CODE 250: Performed by: NURSE PRACTITIONER

## 2020-05-05 PROCEDURE — 80048 BASIC METABOLIC PNL TOTAL CA: CPT | Mod: 91

## 2020-05-05 PROCEDURE — 63600175 PHARM REV CODE 636 W HCPCS: Performed by: FAMILY MEDICINE

## 2020-05-05 PROCEDURE — 99233 SBSQ HOSP IP/OBS HIGH 50: CPT | Mod: ,,, | Performed by: INTERNAL MEDICINE

## 2020-05-05 PROCEDURE — 63600175 PHARM REV CODE 636 W HCPCS: Performed by: INTERNAL MEDICINE

## 2020-05-05 PROCEDURE — 84153 ASSAY OF PSA TOTAL: CPT

## 2020-05-05 PROCEDURE — 85025 COMPLETE CBC W/AUTO DIFF WBC: CPT

## 2020-05-05 PROCEDURE — 25000003 PHARM REV CODE 250: Performed by: EMERGENCY MEDICINE

## 2020-05-05 PROCEDURE — 25000003 PHARM REV CODE 250: Performed by: FAMILY MEDICINE

## 2020-05-05 PROCEDURE — 84550 ASSAY OF BLOOD/URIC ACID: CPT

## 2020-05-05 PROCEDURE — 94640 AIRWAY INHALATION TREATMENT: CPT

## 2020-05-05 PROCEDURE — 84443 ASSAY THYROID STIM HORMONE: CPT

## 2020-05-05 PROCEDURE — 82533 TOTAL CORTISOL: CPT

## 2020-05-05 PROCEDURE — S4991 NICOTINE PATCH NONLEGEND: HCPCS | Performed by: INTERNAL MEDICINE

## 2020-05-05 PROCEDURE — 51702 INSERT TEMP BLADDER CATH: CPT

## 2020-05-05 PROCEDURE — 21400001 HC TELEMETRY ROOM

## 2020-05-05 PROCEDURE — 83735 ASSAY OF MAGNESIUM: CPT

## 2020-05-05 RX ORDER — VERAPAMIL HYDROCHLORIDE 180 MG/1
180 TABLET, FILM COATED, EXTENDED RELEASE ORAL DAILY
Status: DISCONTINUED | OUTPATIENT
Start: 2020-05-05 | End: 2020-05-13 | Stop reason: HOSPADM

## 2020-05-05 RX ORDER — ALPRAZOLAM 1 MG/1
1 TABLET ORAL 3 TIMES DAILY PRN
Status: DISCONTINUED | OUTPATIENT
Start: 2020-05-05 | End: 2020-05-05

## 2020-05-05 RX ORDER — SODIUM CHLORIDE 9 MG/ML
INJECTION, SOLUTION INTRAVENOUS CONTINUOUS
Status: DISCONTINUED | OUTPATIENT
Start: 2020-05-05 | End: 2020-05-05

## 2020-05-05 RX ORDER — ALBUTEROL SULFATE 0.83 MG/ML
10 SOLUTION RESPIRATORY (INHALATION) ONCE
Status: COMPLETED | OUTPATIENT
Start: 2020-05-05 | End: 2020-05-05

## 2020-05-05 RX ORDER — TAMSULOSIN HYDROCHLORIDE 0.4 MG/1
0.4 CAPSULE ORAL DAILY
Status: DISCONTINUED | OUTPATIENT
Start: 2020-05-05 | End: 2020-05-06

## 2020-05-05 RX ORDER — BUSPIRONE HYDROCHLORIDE 5 MG/1
5 TABLET ORAL 3 TIMES DAILY
Status: DISCONTINUED | OUTPATIENT
Start: 2020-05-05 | End: 2020-05-13 | Stop reason: HOSPADM

## 2020-05-05 RX ORDER — BUSPIRONE HYDROCHLORIDE 5 MG/1
5 TABLET ORAL 3 TIMES DAILY
Status: DISCONTINUED | OUTPATIENT
Start: 2020-05-05 | End: 2020-05-05

## 2020-05-05 RX ORDER — HYDRALAZINE HYDROCHLORIDE 10 MG/1
10 TABLET, FILM COATED ORAL EVERY 8 HOURS
Status: DISCONTINUED | OUTPATIENT
Start: 2020-05-05 | End: 2020-05-10

## 2020-05-05 RX ORDER — LABETALOL HYDROCHLORIDE 5 MG/ML
10 INJECTION, SOLUTION INTRAVENOUS
Status: COMPLETED | OUTPATIENT
Start: 2020-05-05 | End: 2020-05-05

## 2020-05-05 RX ORDER — AMLODIPINE BESYLATE 2.5 MG/1
2.5 TABLET ORAL DAILY
Status: DISCONTINUED | OUTPATIENT
Start: 2020-05-05 | End: 2020-05-06

## 2020-05-05 RX ORDER — LISINOPRIL 20 MG/1
20 TABLET ORAL DAILY
Status: DISCONTINUED | OUTPATIENT
Start: 2020-05-05 | End: 2020-05-05

## 2020-05-05 RX ORDER — CYCLOBENZAPRINE HCL 10 MG
10 TABLET ORAL 3 TIMES DAILY PRN
Status: DISCONTINUED | OUTPATIENT
Start: 2020-05-05 | End: 2020-05-13 | Stop reason: HOSPADM

## 2020-05-05 RX ORDER — LANOLIN ALCOHOL/MO/W.PET/CERES
400 CREAM (GRAM) TOPICAL ONCE
Status: COMPLETED | OUTPATIENT
Start: 2020-05-05 | End: 2020-05-05

## 2020-05-05 RX ORDER — FUROSEMIDE 10 MG/ML
20 INJECTION INTRAMUSCULAR; INTRAVENOUS ONCE
Status: COMPLETED | OUTPATIENT
Start: 2020-05-05 | End: 2020-05-05

## 2020-05-05 RX ORDER — ALPRAZOLAM 0.5 MG/1
0.5 TABLET ORAL NIGHTLY PRN
Status: DISCONTINUED | OUTPATIENT
Start: 2020-05-05 | End: 2020-05-10

## 2020-05-05 RX ORDER — MAG HYDROX/ALUMINUM HYD/SIMETH 200-200-20
30 SUSPENSION, ORAL (FINAL DOSE FORM) ORAL EVERY 6 HOURS PRN
Status: DISCONTINUED | OUTPATIENT
Start: 2020-05-05 | End: 2020-05-13 | Stop reason: HOSPADM

## 2020-05-05 RX ADMIN — ALUMINUM HYDROXIDE, MAGNESIUM HYDROXIDE, AND SIMETHICONE 30 ML: 200; 200; 20 SUSPENSION ORAL at 05:05

## 2020-05-05 RX ADMIN — ALPRAZOLAM 0.5 MG: 0.5 TABLET ORAL at 10:05

## 2020-05-05 RX ADMIN — LABETALOL HYDROCHLORIDE 10 MG: 5 INJECTION, SOLUTION INTRAVENOUS at 01:05

## 2020-05-05 RX ADMIN — HYDRALAZINE HYDROCHLORIDE 10 MG: 10 TABLET, FILM COATED ORAL at 10:05

## 2020-05-05 RX ADMIN — ACETAMINOPHEN 650 MG: 325 TABLET ORAL at 02:05

## 2020-05-05 RX ADMIN — VERAPAMIL HYDROCHLORIDE 180 MG: 180 TABLET, FILM COATED, EXTENDED RELEASE ORAL at 04:05

## 2020-05-05 RX ADMIN — SODIUM CHLORIDE: 0.9 INJECTION, SOLUTION INTRAVENOUS at 12:05

## 2020-05-05 RX ADMIN — FUROSEMIDE 20 MG: 10 INJECTION, SOLUTION INTRAMUSCULAR; INTRAVENOUS at 07:05

## 2020-05-05 RX ADMIN — Medication 400 MG: at 11:05

## 2020-05-05 RX ADMIN — ONDANSETRON 4 MG: 2 INJECTION INTRAMUSCULAR; INTRAVENOUS at 03:05

## 2020-05-05 RX ADMIN — SIMVASTATIN 40 MG: 20 TABLET, FILM COATED ORAL at 10:05

## 2020-05-05 RX ADMIN — CARVEDILOL 25 MG: 12.5 TABLET, FILM COATED ORAL at 08:05

## 2020-05-05 RX ADMIN — CARVEDILOL 25 MG: 12.5 TABLET, FILM COATED ORAL at 10:05

## 2020-05-05 RX ADMIN — HYDRALAZINE HYDROCHLORIDE 10 MG: 20 INJECTION INTRAMUSCULAR; INTRAVENOUS at 01:05

## 2020-05-05 RX ADMIN — LISINOPRIL 20 MG: 20 TABLET ORAL at 10:05

## 2020-05-05 RX ADMIN — LORAZEPAM 1 MG: 2 INJECTION INTRAMUSCULAR; INTRAVENOUS at 02:05

## 2020-05-05 RX ADMIN — ALBUTEROL SULFATE 10 MG: 2.5 SOLUTION RESPIRATORY (INHALATION) at 07:05

## 2020-05-05 RX ADMIN — SODIUM CHLORIDE: 0.9 INJECTION, SOLUTION INTRAVENOUS at 10:05

## 2020-05-05 RX ADMIN — HYDRALAZINE HYDROCHLORIDE 10 MG: 20 INJECTION INTRAMUSCULAR; INTRAVENOUS at 06:05

## 2020-05-05 RX ADMIN — BUSPIRONE HYDROCHLORIDE 5 MG: 5 TABLET ORAL at 05:05

## 2020-05-05 RX ADMIN — AMLODIPINE BESYLATE 2.5 MG: 2.5 TABLET ORAL at 04:05

## 2020-05-05 RX ADMIN — PANTOPRAZOLE SODIUM 40 MG: 40 TABLET, DELAYED RELEASE ORAL at 08:05

## 2020-05-05 RX ADMIN — HYDRALAZINE HYDROCHLORIDE 10 MG: 20 INJECTION INTRAMUSCULAR; INTRAVENOUS at 12:05

## 2020-05-05 RX ADMIN — TAMSULOSIN HYDROCHLORIDE 0.4 MG: 0.4 CAPSULE ORAL at 11:05

## 2020-05-05 RX ADMIN — ALPRAZOLAM 1 MG: 1 TABLET ORAL at 10:05

## 2020-05-05 RX ADMIN — ASPIRIN 81 MG 81 MG: 81 TABLET ORAL at 08:05

## 2020-05-05 RX ADMIN — BUSPIRONE HYDROCHLORIDE 5 MG: 5 TABLET ORAL at 10:05

## 2020-05-05 RX ADMIN — Medication 1 PATCH: at 08:05

## 2020-05-05 RX ADMIN — Medication 1 G: at 05:05

## 2020-05-05 NOTE — CARE UPDATE
Discussed care update with patient and wife  Nurse present    Discussed fluid restriction and discontinuing meds known to cause SIADH    Discontinued xanax, ativan, Prozac, and amitriptyline    Will allow slow taper of xanax    Discontinue ivf  Ur Na elevated   neph consulted. Consider na tablet 1g BID if no improvement  Continue to slowly increase na   Goal to approx 130    Able to get collateral from PCP, 's office.   Normal na in recent past  Last Na 126 in [id number]    Reviewed CT and CXR, no acute abn

## 2020-05-05 NOTE — HPI
Juwan Pozo is a 59-year-old  man with history of hypertension, anxiety, PTSD, Major depression, on medications including Prozac, patient was admitted to the hospital yesterday for generalized weakness, his serum sodium on presentation was 121, initially thought that he might be dehydrated hydration was started on IV fluids, his serum sodium decreased to 117, we were consulted  because of worsening hyponatremia, renal function is stable with serum creatinine 0.8 mg/dL,

## 2020-05-05 NOTE — PROGRESS NOTES
Message sent to  regarding pt c/o chest discomfort over chest. bp 178/93 low sodium level and numbness to ble, awaiting response.

## 2020-05-05 NOTE — PROGRESS NOTES
Pt hx=000. Notified dr. Duffy. Pt had difficulty urinating. Bladder scan done >320 noted in bladder. md notfied. Notified tommie delatorre ,tommie villarreal and dr. Duffy of [facility] of pt low sodium. All aware.

## 2020-05-05 NOTE — HOSPITAL COURSE
5/5: continues to exhibit anxiety. Continues to have nonspecific c/o difficulties taking deep breath, muscle spasms and abdominal discomfort. Was taking abx for abscess. Drained by derm and started on rifampin. Reports stopping lisinopril d/t hypotension. Denies otc supplements. Reports started to workout 1-2 months ago and taking whey protein but no other supplementation. Denies any recent changes to home meds except abx.  5/6: anxiety improving. Na no improvement with NaCl tablets.  On tele. Slight increase in wbc, will obtain blood cx. Afebrile, cxr, and ct head wnl. neph consulted. tolvaptan started. Denies recent use of steroids but was on rifampin for axillary abscesses. Awaiting cortisol and acth 5/11/20 The patient received tolvaptan on 5/7/20. Yesterday the patient was placed on a 1000 ml fluid restriction. The patients Na has increased to 127 today, Nephrology following. While in the ICU the patient developed some ICU delirium which is improved today. Will continue to monitor and plan to discharge once confusion resolves.   5/12- Pt is awake , alert, oriented to self , place and person. Worked with PT today . He has walked 120 feet without any AD but with CGA for safety/balance. Requesting to resume home dose of Xanax . Feels better overall . Voiced no C/O. BP control is fair . Na 127 today . Will start NaCl tab 1000 mg bid with Lasix 20 mg po daily.   5/13- Pt continues to feel better , voiced no c/o today . Vitals remains stable . Serum sodium is 128 today . Prozac and Amitriptyline are being held since admission . Pt started on Buspar and Seroquel and has been tolerating without adverse effects . Placed back on Xanax . Continued on NaCl tab along with 1 liter /day fluid restriction . Due to overall clinical improvement and stability, decision is made to discharge pt home with close follow up with PCP . Pt was examined before discharge and found stable and suitable for discharge .

## 2020-05-05 NOTE — ASSESSMENT & PLAN NOTE
Serum sodium 121, with chloride 88, suggesting volume depletion.  Patient clinically appears dry.  Follow-up on urine osmolality.  States that he drinks 4-5 glasses of water per day, denies excessive free water intake.  Continue normal saline at 75 cc/hour for 1 L.  BMP every 6 hr x4.  Avoid over-correction, no more than 8-10 mEq sodium correction per day.  Hold SSRI.

## 2020-05-05 NOTE — ASSESSMENT & PLAN NOTE
Serum sodium 121, with chloride 88, suggesting volume depletion.  Patient clinically appears dry.  Follow-up on urine osmolality.  States that he drinks 4-5 glasses of water per day, denies excessive free water intake.  Continue normal saline at 75 cc/hour for 1 L.  BMP every 6 hr x4.  Avoid over-correction, no more than 8-10 mEq sodium correction per day.  Hold SSRI.    5/5  Stat labs  Urine osm pending  Urine Na 132  BNP wnl  tsh wnl  Continue ivf @75  No recent changes to depression meds. Holding SSRI  Restart oral xanax and d/c ativan  Avoid overcorrection of na  Seizure precautions

## 2020-05-05 NOTE — SUBJECTIVE & OBJECTIVE
Interval History: anxious. ivf overnight. Na not collected. Ordered q6h. Stat labs. Seizure precautions. Cont fluids. Resume home anxiety meds    Review of Systems   Constitutional: Negative for activity change and appetite change.   HENT:        Dry mouth, taste change   Respiratory: Negative for cough and shortness of breath.         Difficulty taking a deep breath   Cardiovascular: Negative for leg swelling.   Gastrointestinal: Positive for abdominal pain and constipation. Negative for nausea and vomiting.        Passing gas   Musculoskeletal: Positive for arthralgias (chronic) and neck pain (chronic).   Skin: Positive for color change and wound.   Neurological: Positive for numbness.   Psychiatric/Behavioral: Positive for dysphoric mood. Negative for decreased concentration. The patient is nervous/anxious.      Objective:     Vital Signs (Most Recent):  Temp: 98.8 °F (37.1 °C) (05/05/20 0740)  Pulse: 100 (05/05/20 0833)  Resp: 20 (05/05/20 0740)  BP: (!) 175/96 (05/05/20 0833)  SpO2: 96 % (05/05/20 0740) Vital Signs (24h Range):  Temp:  [98.4 °F (36.9 °C)-99.2 °F (37.3 °C)] 98.8 °F (37.1 °C)  Pulse:  [] 100  Resp:  [10-20] 20  SpO2:  [95 %-100 %] 96 %  BP: (171-195)/() 175/96     Weight: 91 kg (200 lb 9.9 oz)  Body mass index is 26.47 kg/m².    Intake/Output Summary (Last 24 hours) at 5/5/2020 0956  Last data filed at 5/5/2020 0352  Gross per 24 hour   Intake 120 ml   Output 500 ml   Net -380 ml      Physical Exam   Constitutional: He is oriented to person, place, and time. He appears well-developed and well-nourished. He appears distressed.   HENT:   Head: Normocephalic and atraumatic.   Eyes: Pupils are equal, round, and reactive to light. EOM are normal.   Neck: Normal range of motion. Neck supple.   Cardiovascular: Normal rate.   Chest wall tenderness   Pulmonary/Chest: Effort normal. No respiratory distress.   Abdominal: Soft. He exhibits no distension. There is tenderness.   Musculoskeletal: He  exhibits no edema or deformity.   Nicotine patch on left delt   Neurological: He is alert and oriented to person, place, and time.   Skin: Skin is warm and dry. There is erythema.   Mild erythema right axillary, no induration, no fluctuance   Left axillary region, bandaged s/p I&D   Psychiatric: His speech is normal. His mood appears anxious. He is slowed. He is not actively hallucinating. Cognition and memory are normal. He exhibits a depressed mood. He is attentive.   Vitals reviewed.      Significant Labs:   CBC:   Recent Labs   Lab 05/04/20  1500 05/05/20  0845   WBC 10.61 14.37*   HGB 14.3 14.7   HCT 41.4 43.0    294     CMP:   Recent Labs   Lab 05/04/20  1500 05/04/20  2355   * 120*   K 4.4 3.7   CL 88* 89*   CO2 20* 19*   * 89   BUN 11 9   CREATININE 1.0 0.8   CALCIUM 9.3 9.0   PROT 7.5  --    ALBUMIN 4.3  --    BILITOT 1.1*  --    ALKPHOS 95  --    AST 21  --    ALT 27  --    ANIONGAP 13 12   EGFRNONAA >60 >60     Troponin:   Recent Labs   Lab 05/04/20  1500 05/04/20  1905   TROPONINI 0.006 <0.006     TSH:   Recent Labs   Lab 05/05/20  0848   TSH 0.880       Significant Imaging: CT: I have reviewed all pertinent results/findings within the past 24 hours and my personal findings are:  no intracranial abn  CXR: I have reviewed all pertinent results/findings within the past 24 hours and my personal findings are:  no acute process

## 2020-05-05 NOTE — ASSESSMENT & PLAN NOTE
Patient appears severely anxious.    Ativan 1 mg IV q.4 hours as needed.    5/5  Anxiety persists  Resume home meds  C/o difficulties taking deep breath and chest and body tightness and numbness  Ekg, Cxr and trops on admission wnl  On tele

## 2020-05-05 NOTE — PROGRESS NOTES
Spoke with nurse at dr. Flaherty office. States only abnormal in July 2016 of 127. Notified. Dr. Duffy / dr. Benitez. Dr. Duffy to pt room. Spoke with wife over speaker phone in front of pt. Pt on 1l fluid restriction. Call light. Phone in reach. Bed alarm on.

## 2020-05-05 NOTE — HPI
Mr. Pozo is a 59-year-old  female with PMH significant for hypertension, anxiety disorder, presented to the ED complaining of just not feeling well since yesterday afternoon.  Patient reports vague symptoms like anxiety, dry mouth, bilateral lower extremity numbness, chest discomfort.  Denies fever, chills.  Afebrile.  Blood blood pressure 194/92, received labetalol 10 mg IV x1, with improvement in blood pressure to 176/93.  Laboratory workup reveals sodium 121, chloride 88, CO2 20.  CT head unremarkable.  Rest of the laboratory workup unremarkable.  Chest x-ray without infiltrates, masses or effusions.  COVID 19 test negative.    Admitting diagnosis hyponatremia, likely volume depletion related.

## 2020-05-05 NOTE — SUBJECTIVE & OBJECTIVE
Past Medical History:   Diagnosis Date    Depression     High cholesterol     Hypertension        Past Surgical History:   Procedure Laterality Date    APPENDECTOMY      arm surgery      KNEE SURGERY         Review of patient's allergies indicates:   Allergen Reactions    Codeine     Penicillins      Current Facility-Administered Medications   Medication Frequency    acetaminophen tablet 650 mg Q6H PRN    albuterol-ipratropium 2.5 mg-0.5 mg/3 mL nebulizer solution 3 mL Q4H PRN    ALPRAZolam tablet 0.5 mg Nightly PRN    aluminum-magnesium hydroxide-simethicone 200-200-20 mg/5 mL suspension 30 mL Q6H PRN    amLODIPine tablet 2.5 mg Daily    aspirin chewable tablet 81 mg Daily    busPIRone tablet 5 mg TID    carvediloL tablet 25 mg BID    cyclobenzaprine tablet 10 mg TID PRN    diphenhydrAMINE capsule 25 mg Q6H PRN    guaifenesin 100 mg/5 ml syrup 200 mg Q4H PRN    hydrALAZINE injection 10 mg Q6H PRN    influenza (QUADRIVALENT PF) vaccine 0.5 mL vaccine x 1 dose    lisinopriL tablet 20 mg Daily    nicotine 21 mg/24 hr 1 patch Daily    ondansetron injection 4 mg Q8H PRN    pantoprazole EC tablet 40 mg Daily    simvastatin tablet 40 mg QHS    tamsulosin 24 hr capsule 0.4 mg Daily    verapamiL CR tablet 180 mg Daily     Family History     Problem Relation (Age of Onset)    Hypertension Mother        Tobacco Use    Smoking status: Current Every Day Smoker     Packs/day: 1.00     Types: Cigarettes   Substance and Sexual Activity    Alcohol use: Not Currently    Drug use: Not Currently    Sexual activity: Not on file     Review of Systems   Constitutional: Positive for activity change and fatigue. Negative for appetite change.   HENT: Negative for congestion and facial swelling.    Eyes: Negative for pain, discharge and redness.   Respiratory: Negative for apnea, cough and chest tightness.    Cardiovascular: Negative for chest pain, palpitations and leg swelling.   Gastrointestinal: Negative  for abdominal distention.   Genitourinary: Negative for difficulty urinating, dysuria and frequency.   Musculoskeletal: Positive for arthralgias. Negative for neck pain and neck stiffness.   Skin: Negative for color change, rash and wound.   Neurological: Positive for weakness. Negative for dizziness and numbness.   Psychiatric/Behavioral: Negative for sleep disturbance.   All other systems reviewed and are negative.    Objective:     Vital Signs (Most Recent):  Temp: 97.9 °F (36.6 °C) (05/05/20 1139)  Pulse: 98 (05/05/20 1446)  Resp: 20 (05/05/20 1139)  BP: (!) 166/89 (05/05/20 1446)  SpO2: 98 % (05/05/20 1139)  O2 Device (Oxygen Therapy): room air (05/05/20 0231) Vital Signs (24h Range):  Temp:  [97.9 °F (36.6 °C)-99.2 °F (37.3 °C)] 97.9 °F (36.6 °C)  Pulse:  [] 98  Resp:  [10-20] 20  SpO2:  [95 %-100 %] 98 %  BP: (166-195)/() 166/89     Weight: 91 kg (200 lb 9.9 oz) (05/05/20 0352)  Body mass index is 26.47 kg/m².  Body surface area is 2.16 meters squared.    I/O last 3 completed shifts:  In: 120 [P.O.:120]  Out: 500 [Urine:500]    Physical Exam   Constitutional: He is oriented to person, place, and time. He appears well-developed and well-nourished. No distress.   HENT:   Head: Normocephalic and atraumatic.   Eyes: Pupils are equal, round, and reactive to light.   Neck: Normal range of motion. Neck supple. No tracheal deviation present. No thyromegaly present.   Cardiovascular: Normal rate, regular rhythm, normal heart sounds and intact distal pulses. Exam reveals no gallop and no friction rub.   No murmur heard.  Pulmonary/Chest: Effort normal and breath sounds normal. He has no wheezes. He has no rales.   Abdominal: Soft. He exhibits no mass. There is no tenderness. There is no rebound and no guarding.   Musculoskeletal: Normal range of motion. He exhibits no edema.   Lymphadenopathy:     He has no cervical adenopathy.   Neurological: He is alert and oriented to person, place, and time.   Skin:  Skin is warm. No rash noted. He is not diaphoretic. No erythema.   Nursing note and vitals reviewed.      Significant Labs:  CBC:   Recent Labs   Lab 05/05/20  0845   WBC 14.37*   RBC 4.92   HGB 14.7   HCT 43.0      MCV 87   MCH 29.9   MCHC 34.2     CMP:   Recent Labs   Lab 05/04/20  1500  05/05/20  0845   *   < > 104   CALCIUM 9.3   < > 9.0   ALBUMIN 4.3  --   --    PROT 7.5  --   --    *   < > 117*   K 4.4   < > 4.2   CO2 20*   < > 17*   CL 88*   < > 89*   BUN 11   < > 8   CREATININE 1.0   < > 0.8   ALKPHOS 95  --   --    ALT 27  --   --    AST 21  --   --    BILITOT 1.1*  --   --     < > = values in this interval not displayed.     Coagulation: No results for input(s): PT, INR, APTT in the last 168 hours.  LFTs:   Recent Labs   Lab 05/04/20  1500   ALT 27   AST 21   ALKPHOS 95   BILITOT 1.1*   PROT 7.5   ALBUMIN 4.3     All labs within the past 24 hours have been reviewed.    Significant Imaging:  Reviewed    Lab Results   Component Value Date    CALCIUM 9.0 05/05/2020    PHOS 3.3 05/05/2020

## 2020-05-05 NOTE — PROGRESS NOTES
In and out cath done using sterile technique. Tolerated well. 300cc of kevin urine. Specimen to be sent to lab. Pt states he does not feel  Well. Instructed that it will take days to get his sodium level back to normal. Spoke with wife this am. Bed alarm on. bp still elevated. Will recheck in 40 m minutes and give hydralizine prn.

## 2020-05-05 NOTE — CONSULTS
[facility]  Nephrology  Consult Note      Patient Name: Juwan Pozo  MRN: [id number]  Admission Date: 5/4/2020  Hospital Length of Stay: 1 days  Attending Provider: Geoff Daniel MD   Primary Care Physician: Primary Doctor No  Principal Problem:Hyponatremia    Consults  Subjective:     HPI: Juwan Pozo is a 59-year-old  man with history of hypertension, anxiety, PTSD, Major depression, on medications including Prozac, patient was admitted to the hospital yesterday for generalized weakness, his serum sodium on presentation was 121, initially thought that he might be dehydrated hydration was started on IV fluids, his serum sodium decreased to 117, we were consulted  because of worsening hyponatremia, renal function is stable with serum creatinine 0.8 mg/dL,    Past Medical History:   Diagnosis Date    Depression     High cholesterol     Hypertension        Past Surgical History:   Procedure Laterality Date    APPENDECTOMY      arm surgery      KNEE SURGERY         Review of patient's allergies indicates:   Allergen Reactions    Codeine     Penicillins      Current Facility-Administered Medications   Medication Frequency    acetaminophen tablet 650 mg Q6H PRN    albuterol-ipratropium 2.5 mg-0.5 mg/3 mL nebulizer solution 3 mL Q4H PRN    ALPRAZolam tablet 0.5 mg Nightly PRN    aluminum-magnesium hydroxide-simethicone 200-200-20 mg/5 mL suspension 30 mL Q6H PRN    amLODIPine tablet 2.5 mg Daily    aspirin chewable tablet 81 mg Daily    busPIRone tablet 5 mg TID    carvediloL tablet 25 mg BID    cyclobenzaprine tablet 10 mg TID PRN    diphenhydrAMINE capsule 25 mg Q6H PRN    guaifenesin 100 mg/5 ml syrup 200 mg Q4H PRN    hydrALAZINE injection 10 mg Q6H PRN    influenza (QUADRIVALENT PF) vaccine 0.5 mL vaccine x 1 dose    lisinopriL tablet 20 mg Daily    nicotine 21 mg/24 hr 1 patch Daily    ondansetron injection 4 mg Q8H PRN    pantoprazole EC tablet 40 mg Daily     simvastatin tablet 40 mg QHS    tamsulosin 24 hr capsule 0.4 mg Daily    verapamiL CR tablet 180 mg Daily     Family History     Problem Relation (Age of Onset)    Hypertension Mother        Tobacco Use    Smoking status: Current Every Day Smoker     Packs/day: 1.00     Types: Cigarettes   Substance and Sexual Activity    Alcohol use: Not Currently    Drug use: Not Currently    Sexual activity: Not on file     Review of Systems   Constitutional: Positive for activity change and fatigue. Negative for appetite change.   HENT: Negative for congestion and facial swelling.    Eyes: Negative for pain, discharge and redness.   Respiratory: Negative for apnea, cough and chest tightness.    Cardiovascular: Negative for chest pain, palpitations and leg swelling.   Gastrointestinal: Negative for abdominal distention.   Genitourinary: Negative for difficulty urinating, dysuria and frequency.   Musculoskeletal: Positive for arthralgias. Negative for neck pain and neck stiffness.   Skin: Negative for color change, rash and wound.   Neurological: Positive for weakness. Negative for dizziness and numbness.   Psychiatric/Behavioral: Negative for sleep disturbance.   All other systems reviewed and are negative.    Objective:     Vital Signs (Most Recent):  Temp: 97.9 °F (36.6 °C) (05/05/20 1139)  Pulse: 98 (05/05/20 1446)  Resp: 20 (05/05/20 1139)  BP: (!) 166/89 (05/05/20 1446)  SpO2: 98 % (05/05/20 1139)  O2 Device (Oxygen Therapy): room air (05/05/20 0231) Vital Signs (24h Range):  Temp:  [97.9 °F (36.6 °C)-99.2 °F (37.3 °C)] 97.9 °F (36.6 °C)  Pulse:  [] 98  Resp:  [10-20] 20  SpO2:  [95 %-100 %] 98 %  BP: (166-195)/() 166/89     Weight: 91 kg (200 lb 9.9 oz) (05/05/20 0352)  Body mass index is 26.47 kg/m².  Body surface area is 2.16 meters squared.    I/O last 3 completed shifts:  In: 120 [P.O.:120]  Out: 500 [Urine:500]    Physical Exam   Constitutional: He is oriented to person, place, and time. He  appears well-developed and well-nourished. No distress.   HENT:   Head: Normocephalic and atraumatic.   Eyes: Pupils are equal, round, and reactive to light.   Neck: Normal range of motion. Neck supple. No tracheal deviation present. No thyromegaly present.   Cardiovascular: Normal rate, regular rhythm, normal heart sounds and intact distal pulses. Exam reveals no gallop and no friction rub.   No murmur heard.  Pulmonary/Chest: Effort normal and breath sounds normal. He has no wheezes. He has no rales.   Abdominal: Soft. He exhibits no mass. There is no tenderness. There is no rebound and no guarding.   Musculoskeletal: Normal range of motion. He exhibits no edema.   Lymphadenopathy:     He has no cervical adenopathy.   Neurological: He is alert and oriented to person, place, and time.   Skin: Skin is warm. No rash noted. He is not diaphoretic. No erythema.   Nursing note and vitals reviewed.      Significant Labs:  CBC:   Recent Labs   Lab 05/05/20  0845   WBC 14.37*   RBC 4.92   HGB 14.7   HCT 43.0      MCV 87   MCH 29.9   MCHC 34.2     CMP:   Recent Labs   Lab 05/04/20  1500  05/05/20  0845   *   < > 104   CALCIUM 9.3   < > 9.0   ALBUMIN 4.3  --   --    PROT 7.5  --   --    *   < > 117*   K 4.4   < > 4.2   CO2 20*   < > 17*   CL 88*   < > 89*   BUN 11   < > 8   CREATININE 1.0   < > 0.8   ALKPHOS 95  --   --    ALT 27  --   --    AST 21  --   --    BILITOT 1.1*  --   --     < > = values in this interval not displayed.     Coagulation: No results for input(s): PT, INR, APTT in the last 168 hours.  LFTs:   Recent Labs   Lab 05/04/20  1500   ALT 27   AST 21   ALKPHOS 95   BILITOT 1.1*   PROT 7.5   ALBUMIN 4.3     All labs within the past 24 hours have been reviewed.    Significant Imaging:  Reviewed    Lab Results   Component Value Date    CALCIUM 9.0 05/05/2020    PHOS 3.3 05/05/2020         Assessment/Plan:     * Hyponatremia  1.  Hyponatremia, recent serum sodium is 117, likely SIADH, pending  from his medications, elevated urine osmolality and serum uric acid less than 10 consistent with diagnosis of SIADH, will discontinue IV fluids, patient be placed on fluid restriction of 1 L per day, check serum sodium level every 4 hr, target correction not more than 8 points in 24 hr,    2.  Hypertension, will add low-dose amlodipine to his current medications,    3.  Stable renal fn, serum cr 0.8 mg/dl         Thank you for your consult. I will follow-up with patient. Please contact us if you have any additional questions.     Total time spent 70 minutes including time needed to review the records,  patient  evaluation, documentation, face-to-face discussion with the patient, primary team,     more than 50% of the time was spent on coordination of care and counseling.       Rey Benitez MD   Nephrology  [facility]

## 2020-05-05 NOTE — ED NOTES
"The pt is anxious again about why he feels weak and we can't find anything wrong with him.  He asked if he could have cancer from head to toe.  Dr. Phipps spoke with the pt.  I offered the pt some ativan but he refused.  He states, "I don't want to take anything to relax me too much because I might urinate on myself"  "

## 2020-05-05 NOTE — TREATMENT PLAN
Repeat Na decreased to 116 after fluid restriction  Increase in K    Hold lisinopril    Will start 1g NaCl tablet BID and monitor    Evaluate for primary adrenal insufficiency with cortisol and acth labs in AM. Patient recently on rifampin for axillary abscesses per derm

## 2020-05-05 NOTE — PROGRESS NOTES
[facility] Hospital Medicine  Progress Note    Patient Name: Juwan Pozo  MRN: [id number]  Patient Class: IP- Inpatient   Admission Date: 5/4/2020  Length of Stay: 1 days  Attending Physician: Geoff Daniel MD  Primary Care Provider: Primary Doctor No        Subjective:     Principal Problem:Hyponatremia        HPI:  Mr. Pozo is a 59-year-old  female with PMH significant for hypertension, anxiety disorder, presented to the ED complaining of just not feeling well since yesterday afternoon.  Patient reports vague symptoms like anxiety, dry mouth, bilateral lower extremity numbness, chest discomfort.  Denies fever, chills.  Afebrile.  Blood blood pressure 194/92, received labetalol 10 mg IV x1, with improvement in blood pressure to 176/93.  Laboratory workup reveals sodium 121, chloride 88, CO2 20.  CT head unremarkable.  Rest of the laboratory workup unremarkable.  Chest x-ray without infiltrates, masses or effusions.  COVID 19 test negative.    Admitting diagnosis hyponatremia, likely volume depletion related.    Overview/Hospital Course:  5/5: continues to exhibit anxiety. Continues to have nonspecific c/o difficulties taking deep breath, muscle spasms and abdominal discomfort. Was taking abx for abscess. Drained by derm and started on rifampin. Reports stopping lisinopril d/t hypotension. Denies otc supplements. Reports started to workout 1-2 months ago and taking whey protein but no other supplementation. Denies any recent changes to home meds except abx.    Interval History: anxious. ivf overnight. Na not collected. Ordered q6h. Stat labs. Seizure precautions. Cont fluids. Resume home anxiety meds    Review of Systems   Constitutional: Negative for activity change and appetite change.   HENT:        Dry mouth, taste change   Respiratory: Negative for cough and shortness of breath.         Difficulty taking a deep breath   Cardiovascular: Negative for leg swelling.    Gastrointestinal: Positive for abdominal pain and constipation. Negative for nausea and vomiting.        Passing gas   Musculoskeletal: Positive for arthralgias (chronic) and neck pain (chronic).   Skin: Positive for color change and wound.   Neurological: Positive for numbness.   Psychiatric/Behavioral: Positive for dysphoric mood. Negative for decreased concentration. The patient is nervous/anxious.      Objective:     Vital Signs (Most Recent):  Temp: 98.8 °F (37.1 °C) (05/05/20 0740)  Pulse: 100 (05/05/20 0833)  Resp: 20 (05/05/20 0740)  BP: (!) 175/96 (05/05/20 0833)  SpO2: 96 % (05/05/20 0740) Vital Signs (24h Range):  Temp:  [98.4 °F (36.9 °C)-99.2 °F (37.3 °C)] 98.8 °F (37.1 °C)  Pulse:  [] 100  Resp:  [10-20] 20  SpO2:  [95 %-100 %] 96 %  BP: (171-195)/() 175/96     Weight: 91 kg (200 lb 9.9 oz)  Body mass index is 26.47 kg/m².    Intake/Output Summary (Last 24 hours) at 5/5/2020 0951  Last data filed at 5/5/2020 0352  Gross per 24 hour   Intake 120 ml   Output 500 ml   Net -380 ml      Physical Exam   Constitutional: He is oriented to person, place, and time. He appears well-developed and well-nourished. He appears distressed.   HENT:   Head: Normocephalic and atraumatic.   Eyes: Pupils are equal, round, and reactive to light. EOM are normal.   Neck: Normal range of motion. Neck supple.   Cardiovascular: Normal rate.   Chest wall tenderness   Pulmonary/Chest: Effort normal. No respiratory distress.   Abdominal: Soft. He exhibits no distension. There is tenderness.   Musculoskeletal: He exhibits no edema or deformity.   Nicotine patch on left delt   Neurological: He is alert and oriented to person, place, and time.   Skin: Skin is warm and dry. There is erythema.   Mild erythema right axillary, no induration, no fluctuance   Left axillary region, bandaged s/p I&D   Psychiatric: His speech is normal. His mood appears anxious. He is slowed. He is not actively hallucinating. Cognition and memory  are normal. He exhibits a depressed mood. He is attentive.   Vitals reviewed.      Significant Labs:   CBC:   Recent Labs   Lab 05/04/20  1500 05/05/20  0845   WBC 10.61 14.37*   HGB 14.3 14.7   HCT 41.4 43.0    294     CMP:   Recent Labs   Lab 05/04/20  1500 05/04/20  2355   * 120*   K 4.4 3.7   CL 88* 89*   CO2 20* 19*   * 89   BUN 11 9   CREATININE 1.0 0.8   CALCIUM 9.3 9.0   PROT 7.5  --    ALBUMIN 4.3  --    BILITOT 1.1*  --    ALKPHOS 95  --    AST 21  --    ALT 27  --    ANIONGAP 13 12   EGFRNONAA >60 >60     Troponin:   Recent Labs   Lab 05/04/20  1500 05/04/20  1905   TROPONINI 0.006 <0.006     TSH:   Recent Labs   Lab 05/05/20  0848   TSH 0.880       Significant Imaging: CT: I have reviewed all pertinent results/findings within the past 24 hours and my personal findings are:  no intracranial abn  CXR: I have reviewed all pertinent results/findings within the past 24 hours and my personal findings are:  no acute process      Assessment/Plan:      * Hyponatremia  Serum sodium 121, with chloride 88, suggesting volume depletion.  Patient clinically appears dry.  Follow-up on urine osmolality.  States that he drinks 4-5 glasses of water per day, denies excessive free water intake.  Continue normal saline at 75 cc/hour for 1 L.  BMP every 6 hr x4.  Avoid over-correction, no more than 8-10 mEq sodium correction per day.  Hold SSRI.    5/5  Stat labs  Urine osm pending  Urine Na 132  BNP wnl  tsh wnl  Continue ivf @75  No recent changes to depression meds. Holding SSRI  Restart oral xanax and d/c ativan  Avoid overcorrection of na  Seizure precautions      Hypertension, essential  5/5  Elevated  On carvedilol and verapamil  lisinopril discontinued outpt  Will resume  On tele      Tobacco use  Nicotine patch      Anxiety disorder  Patient appears severely anxious.    Ativan 1 mg IV q.4 hours as needed.    5/5  Anxiety persists  Resume home meds  C/o difficulties taking deep breath and chest and  body tightness and numbness  Ekg, Cxr and trops on admission wnl  On tele            VTE Risk Mitigation (From admission, onward)         Ordered     Place sequential compression device  Until discontinued      05/04/20 8683                      Nirav Duffy MD  Department of Hospital Medicine   [facility]

## 2020-05-05 NOTE — ED NOTES
Pt is anxious about not knowing why he feels weak all over and has a headache.  I explained all the test results again and the pt verbalized understanding.

## 2020-05-05 NOTE — PLAN OF CARE
SW spoke with patient by phone to assess for discharge planning.  Patient sounded alert and oriented.  Patient denied the use of any medical/respiratory assistive equipment and home health services before coming to the hospital.  The patient identified his help at home as his wife and stated that his doctor helps him to manage his healthcare.  Patient stated that he many need a cane at the time of discharge, expressing that before coming to the hospital, he had become so weak that he needed support for standing and walking.  Patient states he will wait until he is closer to discharge to determine if that time he still feels to weak to walk without medical equipment.  Patient denied the need for any assistive equipment, home health services, and all other community resources at this time.  SW discussed information on advanced directives, information on pharmacy bedside delivery, and discharge planning begins on admission with contact information for any needs/questions.     D/C Plan:  Home  PCP:  Dr. Miguel Moran  Preferred Pharmacy:  [facility] ([facility] [place])  Discharge transportation:  Spouse  [facility]:   Pharmacy Bedside Delivery: Declined       05/05/20 4464   Discharge Assessment   Assessment Type Discharge Planning Assessment   Confirmed/corrected address and phone number on facesheet? Yes   Assessment information obtained from? Patient   Expected Length of Stay (days)   (TBD)   Communicated expected length of stay with patient/caregiver yes   Prior to hospitilization cognitive status: Alert/Oriented;Inappropriate Behavior   Prior to hospitalization functional status: Independent   Current cognitive status: Alert/Oriented   Current Functional Status: Independent   Facility Arrived From: Home   Lives With spouse   Able to Return to Prior Arrangements yes   Is patient able to care for self after discharge? Yes   Who are your caregiver(s) and their phone number(s)? Marianna Pozo (wife) [phone]    Patient's perception of discharge disposition home or selfcare   Readmission Within the Last 30 Days no previous admission in last 30 days   Patient currently being followed by outpatient case management? No   Patient currently receives any other outside agency services? No   Equipment Currently Used at Home none   Do you have any problems affording any of your prescribed medications? No   Is the patient taking medications as prescribed? yes   Does the patient have transportation home? Yes   Transportation Anticipated family or friend will provide   Dialysis Name and Scheduled days N/A   Does the patient receive services at the [facility]? No   Discharge Plan A Home with family   DME Needed Upon Discharge  none   Patient/Family in Agreement with Plan yes

## 2020-05-05 NOTE — PLAN OF CARE
Pt AAOx4 .POC reviewed with pt. Pt verbalized understanding   Pt remains free of injuries and falls   SR on tele monitor. HR 90's  IV infusinh NS 75mL/hr   IV antibiotics   C/O of pain 9/10  Pt anxious meds administered   Bed low, side rails up x2, non slip socks in use, call light in reach   Reminded to call for assistance  Hourly rounding complete will continue to monitor

## 2020-05-05 NOTE — PLAN OF CARE
Dx low sodium , chest pain.   Poc instructed on dbc 10 x q1h wa. Instructed on turnign q2h. Bed alar on. Call light in reach. Fluid restrictions 1l. Sodium q4h. Instructed on fall risk and precautions. Chest pain. Numbness and tingling to extremeties. Md aware. Nephro consulted. Pt with anxiety.;

## 2020-05-05 NOTE — ASSESSMENT & PLAN NOTE
1.  Hyponatremia, recent serum sodium is 117, likely SIADH, pending from his medications, elevated urine osmolality and serum uric acid less than 10 consistent with diagnosis of SIADH, will discontinue IV fluids, patient be placed on fluid restriction of 1 L per day, check serum sodium level every 4 hr, target correction not more than 8 points in 24 hr,    2.  Hypertension, will add low-dose amlodipine to his current medications,    3.  Stable renal fn, serum cr 0.8 mg/dl

## 2020-05-05 NOTE — H&P
[facility] Hospital Medicine  History & Physical    Patient Name: Juwan Pozo  MRN: [id number]  Admission Date: 5/4/2020  Attending Physician: Omar Alatorre MD  Primary Care Provider: Primary Doctor No         Patient information was obtained from patient, past medical records and ER records.     Subjective:     Principal Problem:Hyponatremia    Chief Complaint:   Chief Complaint   Patient presents with    Chest Pain     substernal chest tightness. given asa 325 and 2 sl nitros in route w/ no improvement. per ems pt appears anxious.         HPI: Mr. Pozo is a 59-year-old  female with PMH significant for hypertension, anxiety disorder, presented to the ED complaining of just not feeling well since yesterday afternoon.  Patient reports vague symptoms like anxiety, dry mouth, bilateral lower extremity numbness, chest discomfort.  Denies fever, chills.  Afebrile.  Blood blood pressure 194/92, received labetalol 10 mg IV x1, with improvement in blood pressure to 176/93.  Laboratory workup reveals sodium 121, chloride 88, CO2 20.  CT head unremarkable.  Rest of the laboratory workup unremarkable.  Chest x-ray without infiltrates, masses or effusions.  COVID 19 test negative.    Admitting diagnosis hyponatremia, likely volume depletion related.    Past Medical History:   Diagnosis Date    Depression     High cholesterol     Hypertension        Past Surgical History:   Procedure Laterality Date    APPENDECTOMY      arm surgery      KNEE SURGERY         Review of patient's allergies indicates:   Allergen Reactions    Codeine     Penicillins        No current facility-administered medications on file prior to encounter.      Current Outpatient Medications on File Prior to Encounter   Medication Sig    ALPRAZolam (XANAX) 1 MG tablet Take 1 mg by mouth 3 (three) times daily as needed for Anxiety.    amitriptyline (ELAVIL) 25 MG tablet Take 25 mg by mouth nightly as needed for Insomnia.     aspirin 81 MG Chew Take 81 mg by mouth once daily.    carvediloL (COREG) 25 MG tablet Take 25 mg by mouth 2 (two) times daily with meals.    cyclobenzaprine (FLEXERIL) 10 MG tablet Take 10 mg by mouth 3 (three) times daily as needed for Muscle spasms.    FLUoxetine 20 MG capsule Take 20 mg by mouth once daily.    omeprazole (PRILOSEC) 20 MG capsule Take 20 mg by mouth once daily.    rifAMpin (RIFADIN) 300 MG capsule Take 10 mg/kg by mouth once daily.    simvastatin (ZOCOR) 40 MG tablet Take 40 mg by mouth every evening.    sulfamethoxazole-trimethoprim 800-160mg (BACTRIM DS) 800-160 mg Tab Take 1 tablet by mouth every 12 (twelve) hours.    verapamiL (VERELAN) 180 MG C24P Take 180 mg by mouth once daily.     Family History     Problem Relation (Age of Onset)    Hypertension Mother        Tobacco Use    Smoking status: Current Every Day Smoker     Packs/day: 1.00     Types: Cigarettes   Substance and Sexual Activity    Alcohol use: Not Currently    Drug use: Not Currently    Sexual activity: Not on file     Review of Systems   Constitutional: Positive for fatigue. Negative for appetite change and fever.   HENT: Negative.  Negative for congestion, nosebleeds and sore throat.    Eyes: Negative.  Negative for visual disturbance.   Respiratory: Negative.  Negative for cough, shortness of breath and wheezing.    Cardiovascular: Negative.  Negative for chest pain.   Gastrointestinal: Negative.  Negative for abdominal pain, constipation, diarrhea, nausea and vomiting.   Endocrine: Negative.  Negative for polyuria.   Genitourinary: Negative.  Negative for dysuria, flank pain, frequency and urgency.   Musculoskeletal: Negative.  Negative for arthralgias, back pain and joint swelling.   Skin: Negative.  Negative for color change, pallor and rash.   Allergic/Immunologic: Negative.  Negative for environmental allergies, food allergies and immunocompromised state.   Neurological: Positive for weakness  (Generalized). Negative for dizziness, seizures, syncope, light-headedness, numbness and headaches.   Hematological: Negative.    Psychiatric/Behavioral: Positive for decreased concentration. Negative for confusion and hallucinations. The patient is nervous/anxious.    All other systems reviewed and are negative.    Objective:     Vital Signs (Most Recent):  Temp: 98.7 °F (37.1 °C) (05/04/20 1712)  Pulse: 88 (05/04/20 2200)  Resp: 10 (05/04/20 2200)  BP: (!) 176/93 (05/04/20 2200)  SpO2: 97 % (05/04/20 2200) Vital Signs (24h Range):  Temp:  [98.7 °F (37.1 °C)-99.2 °F (37.3 °C)] 98.7 °F (37.1 °C)  Pulse:  [73-96] 88  Resp:  [10-20] 10  SpO2:  [97 %-100 %] 97 %  BP: (176-195)/(74-97) 176/93     Weight: 85.6 kg (188 lb 11.4 oz)  Body mass index is 24.9 kg/m².    Physical Exam   Constitutional: He is oriented to person, place, and time. He appears well-developed. No distress.   HENT:   Head: Normocephalic and atraumatic.   Eyes: Conjunctivae are normal. No scleral icterus.   Neck: Normal range of motion. Neck supple. No thyromegaly present.   Cardiovascular: Normal rate, regular rhythm and normal heart sounds.   No murmur heard.  Pulmonary/Chest: Effort normal and breath sounds normal. No respiratory distress. He has no wheezes. He exhibits no tenderness.   Abdominal: Soft. Bowel sounds are normal. There is no tenderness.   Musculoskeletal: Normal range of motion. He exhibits no edema or tenderness.   Lymphadenopathy:     He has no cervical adenopathy.   Neurological: He is alert and oriented to person, place, and time. No cranial nerve deficit. He exhibits normal muscle tone. Coordination normal.   Patient appears anxious, but following commands.  Alert, oriented x3.   Skin: Skin is warm and dry. He is not diaphoretic.   Psychiatric: His behavior is normal. Thought content normal. His mood appears anxious.   Nursing note and vitals reviewed.          Significant Labs:   BMP:   Recent Labs   Lab 05/04/20  1500   GLU  115*   *   K 4.4   CL 88*   CO2 20*   BUN 11   CREATININE 1.0   CALCIUM 9.3     CBC:   Recent Labs   Lab 05/04/20  1500   WBC 10.61   HGB 14.3   HCT 41.4        CMP:   Recent Labs   Lab 05/04/20  1500   *   K 4.4   CL 88*   CO2 20*   *   BUN 11   CREATININE 1.0   CALCIUM 9.3   PROT 7.5   ALBUMIN 4.3   BILITOT 1.1*   ALKPHOS 95   AST 21   ALT 27   ANIONGAP 13   EGFRNONAA >60     Troponin:   Recent Labs   Lab 05/04/20  1500 05/04/20  1905   TROPONINI 0.006 <0.006     Urine Studies:   Recent Labs   Lab 05/04/20  1658   COLORU Yellow   APPEARANCEUA Clear   PHUR 7.0   SPECGRAV 1.020   PROTEINUA Negative   GLUCUA Negative   KETONESU 1+*   BILIRUBINUA Negative   OCCULTUA 1+*   NITRITE Negative   UROBILINOGEN 1.0   LEUKOCYTESUR Negative   RBCUA 0     All pertinent labs within the past 24 hours have been reviewed.    Significant Imaging: I have reviewed and interpreted all pertinent imaging results/findings within the past 24 hours.     Imaging Results          CT Head Without Contrast (Final result)  Result time 05/04/20 18:10:27    Final result by Calvin Bourgeois MD (05/04/20 18:10:27)                 Impression:      No acute findings.    All CT scans at this facility are performed  using dose modulation techniques as appropriate to performed exam including the following:  automated exposure control; adjustment of mA and/or kV according to the patients size (this includes techniques or standardized protocols for targeted exams where dose is matched to indication/reason for exam: i.e. extremities or head);  iterative reconstruction technique.      Electronically signed by: Calvin Bourgeois  Date:    05/04/2020  Time:    18:10             Narrative:    EXAMINATION:  CT HEAD WITHOUT CONTRAST    CLINICAL HISTORY:  Dizziness;.    TECHNIQUE:  Low dose axial images were obtained through the head.  Coronal and sagittal reformations were also performed. Contrast was not  administered.    COMPARISON:  None.    FINDINGS:  Midline structures are intact. Ventricles are of normal size and configuration. Brain parenchyma is normal with normal differentiation of the gray-white matter.    No intracranial hemorrhage,acute infarct, or suspicious intracranial lesion is identified.    Skull base and calvarium are normal. Visualized sinuses and mastoid air cells are clear.                               X-Ray Chest AP Portable (Final result)  Result time 05/04/20 15:26:29    Final result by Kal Arreaga MD (05/04/20 15:26:29)                 Impression:      No acute findings.      Electronically signed by: Kal Arreaga MD  Date:    05/04/2020  Time:    15:26             Narrative:    EXAMINATION:  XR CHEST AP PORTABLE    CLINICAL HISTORY:  Chest Pain;    TECHNIQUE:  Single frontal view of the chest was performed.    COMPARISON:  None    FINDINGS:  The cardiomediastinal silhouette is normal.    The lungs are clear.  No pleural effusions.    Multilevel spondylosis in the spine.                                I have independently reviewed all pertinent labs within the past 24 hours.    I have independently reviewed, visualized and interpreted all pertinent imaging results within the past 24 hours and discussed the findings with the ED physician, Dr. Phipps.            Assessment/Plan:     * Hyponatremia  Serum sodium 121, with chloride 88, suggesting volume depletion.  Patient clinically appears dry.  Follow-up on urine osmolality.  States that he drinks 4-5 glasses of water per day, denies excessive free water intake.  Continue normal saline at 75 cc/hour for 1 L.  BMP every 6 hr x4.  Avoid over-correction, no more than 8-10 mEq sodium correction per day.  Hold SSRI.      Tobacco use  Nicotine patch      Anxiety disorder  Patient appears severely anxious.    Ativan 1 mg IV q.4 hours as needed.          VTE Risk Mitigation (From admission, onward)         Ordered     Place sequential  compression device  Until discontinued      05/04/20 8580                   Omar Alatorre MD  Department of Hospital Medicine   [facility]

## 2020-05-05 NOTE — SUBJECTIVE & OBJECTIVE
Past Medical History:   Diagnosis Date    Depression     High cholesterol     Hypertension        Past Surgical History:   Procedure Laterality Date    APPENDECTOMY      arm surgery      KNEE SURGERY         Review of patient's allergies indicates:   Allergen Reactions    Codeine     Penicillins        No current facility-administered medications on file prior to encounter.      Current Outpatient Medications on File Prior to Encounter   Medication Sig    ALPRAZolam (XANAX) 1 MG tablet Take 1 mg by mouth 3 (three) times daily as needed for Anxiety.    amitriptyline (ELAVIL) 25 MG tablet Take 25 mg by mouth nightly as needed for Insomnia.    aspirin 81 MG Chew Take 81 mg by mouth once daily.    carvediloL (COREG) 25 MG tablet Take 25 mg by mouth 2 (two) times daily with meals.    cyclobenzaprine (FLEXERIL) 10 MG tablet Take 10 mg by mouth 3 (three) times daily as needed for Muscle spasms.    FLUoxetine 20 MG capsule Take 20 mg by mouth once daily.    omeprazole (PRILOSEC) 20 MG capsule Take 20 mg by mouth once daily.    rifAMpin (RIFADIN) 300 MG capsule Take 10 mg/kg by mouth once daily.    simvastatin (ZOCOR) 40 MG tablet Take 40 mg by mouth every evening.    sulfamethoxazole-trimethoprim 800-160mg (BACTRIM DS) 800-160 mg Tab Take 1 tablet by mouth every 12 (twelve) hours.    verapamiL (VERELAN) 180 MG C24P Take 180 mg by mouth once daily.     Family History     Problem Relation (Age of Onset)    Hypertension Mother        Tobacco Use    Smoking status: Current Every Day Smoker     Packs/day: 1.00     Types: Cigarettes   Substance and Sexual Activity    Alcohol use: Not Currently    Drug use: Not Currently    Sexual activity: Not on file     Review of Systems   Constitutional: Positive for fatigue. Negative for appetite change and fever.   HENT: Negative.  Negative for congestion, nosebleeds and sore throat.    Eyes: Negative.  Negative for visual disturbance.   Respiratory: Negative.   Negative for cough, shortness of breath and wheezing.    Cardiovascular: Negative.  Negative for chest pain.   Gastrointestinal: Negative.  Negative for abdominal pain, constipation, diarrhea, nausea and vomiting.   Endocrine: Negative.  Negative for polyuria.   Genitourinary: Negative.  Negative for dysuria, flank pain, frequency and urgency.   Musculoskeletal: Negative.  Negative for arthralgias, back pain and joint swelling.   Skin: Negative.  Negative for color change, pallor and rash.   Allergic/Immunologic: Negative.  Negative for environmental allergies, food allergies and immunocompromised state.   Neurological: Positive for weakness (Generalized). Negative for dizziness, seizures, syncope, light-headedness, numbness and headaches.   Hematological: Negative.    Psychiatric/Behavioral: Positive for decreased concentration. Negative for confusion and hallucinations. The patient is nervous/anxious.    All other systems reviewed and are negative.    Objective:     Vital Signs (Most Recent):  Temp: 98.7 °F (37.1 °C) (05/04/20 1712)  Pulse: 88 (05/04/20 2200)  Resp: 10 (05/04/20 2200)  BP: (!) 176/93 (05/04/20 2200)  SpO2: 97 % (05/04/20 2200) Vital Signs (24h Range):  Temp:  [98.7 °F (37.1 °C)-99.2 °F (37.3 °C)] 98.7 °F (37.1 °C)  Pulse:  [73-96] 88  Resp:  [10-20] 10  SpO2:  [97 %-100 %] 97 %  BP: (176-195)/(74-97) 176/93     Weight: 85.6 kg (188 lb 11.4 oz)  Body mass index is 24.9 kg/m².    Physical Exam   Constitutional: He is oriented to person, place, and time. He appears well-developed. No distress.   HENT:   Head: Normocephalic and atraumatic.   Eyes: Conjunctivae are normal. No scleral icterus.   Neck: Normal range of motion. Neck supple. No thyromegaly present.   Cardiovascular: Normal rate, regular rhythm and normal heart sounds.   No murmur heard.  Pulmonary/Chest: Effort normal and breath sounds normal. No respiratory distress. He has no wheezes. He exhibits no tenderness.   Abdominal: Soft. Bowel  sounds are normal. There is no tenderness.   Musculoskeletal: Normal range of motion. He exhibits no edema or tenderness.   Lymphadenopathy:     He has no cervical adenopathy.   Neurological: He is alert and oriented to person, place, and time. No cranial nerve deficit. He exhibits normal muscle tone. Coordination normal.   Patient appears anxious, but following commands.  Alert, oriented x3.   Skin: Skin is warm and dry. He is not diaphoretic.   Psychiatric: His behavior is normal. Thought content normal. His mood appears anxious.   Nursing note and vitals reviewed.          Significant Labs:   BMP:   Recent Labs   Lab 05/04/20  1500   *   *   K 4.4   CL 88*   CO2 20*   BUN 11   CREATININE 1.0   CALCIUM 9.3     CBC:   Recent Labs   Lab 05/04/20  1500   WBC 10.61   HGB 14.3   HCT 41.4        CMP:   Recent Labs   Lab 05/04/20  1500   *   K 4.4   CL 88*   CO2 20*   *   BUN 11   CREATININE 1.0   CALCIUM 9.3   PROT 7.5   ALBUMIN 4.3   BILITOT 1.1*   ALKPHOS 95   AST 21   ALT 27   ANIONGAP 13   EGFRNONAA >60     Troponin:   Recent Labs   Lab 05/04/20  1500 05/04/20  1905   TROPONINI 0.006 <0.006     Urine Studies:   Recent Labs   Lab 05/04/20  1658   COLORU Yellow   APPEARANCEUA Clear   PHUR 7.0   SPECGRAV 1.020   PROTEINUA Negative   GLUCUA Negative   KETONESU 1+*   BILIRUBINUA Negative   OCCULTUA 1+*   NITRITE Negative   UROBILINOGEN 1.0   LEUKOCYTESUR Negative   RBCUA 0     All pertinent labs within the past 24 hours have been reviewed.    Significant Imaging: I have reviewed and interpreted all pertinent imaging results/findings within the past 24 hours.     Imaging Results          CT Head Without Contrast (Final result)  Result time 05/04/20 18:10:27    Final result by Calvin Bourgeois MD (05/04/20 18:10:27)                 Impression:      No acute findings.    All CT scans at this facility are performed  using dose modulation techniques as appropriate to performed exam  including the following:  automated exposure control; adjustment of mA and/or kV according to the patients size (this includes techniques or standardized protocols for targeted exams where dose is matched to indication/reason for exam: i.e. extremities or head);  iterative reconstruction technique.      Electronically signed by: Calvin Bourgeois  Date:    05/04/2020  Time:    18:10             Narrative:    EXAMINATION:  CT HEAD WITHOUT CONTRAST    CLINICAL HISTORY:  Dizziness;.    TECHNIQUE:  Low dose axial images were obtained through the head.  Coronal and sagittal reformations were also performed. Contrast was not administered.    COMPARISON:  None.    FINDINGS:  Midline structures are intact. Ventricles are of normal size and configuration. Brain parenchyma is normal with normal differentiation of the gray-white matter.    No intracranial hemorrhage,acute infarct, or suspicious intracranial lesion is identified.    Skull base and calvarium are normal. Visualized sinuses and mastoid air cells are clear.                               X-Ray Chest AP Portable (Final result)  Result time 05/04/20 15:26:29    Final result by Kal Arreaga MD (05/04/20 15:26:29)                 Impression:      No acute findings.      Electronically signed by: Kal Arreaga MD  Date:    05/04/2020  Time:    15:26             Narrative:    EXAMINATION:  XR CHEST AP PORTABLE    CLINICAL HISTORY:  Chest Pain;    TECHNIQUE:  Single frontal view of the chest was performed.    COMPARISON:  None    FINDINGS:  The cardiomediastinal silhouette is normal.    The lungs are clear.  No pleural effusions.    Multilevel spondylosis in the spine.                                I have independently reviewed all pertinent labs within the past 24 hours.    I have independently reviewed, visualized and interpreted all pertinent imaging results within the past 24 hours and discussed the findings with the ED physician, Dr. Phipps.

## 2020-05-06 PROBLEM — E83.42 HYPOMAGNESEMIA: Status: ACTIVE | Noted: 2020-05-06

## 2020-05-06 PROBLEM — D72.829 LEUKOCYTOSIS: Status: ACTIVE | Noted: 2020-05-06

## 2020-05-06 LAB
ANION GAP SERPL CALC-SCNC: 10 MMOL/L (ref 8–16)
ANION GAP SERPL CALC-SCNC: 11 MMOL/L (ref 8–16)
ANION GAP SERPL CALC-SCNC: 11 MMOL/L (ref 8–16)
ANION GAP SERPL CALC-SCNC: 12 MMOL/L (ref 8–16)
ANION GAP SERPL CALC-SCNC: 14 MMOL/L (ref 8–16)
BASOPHILS # BLD AUTO: 0.04 K/UL (ref 0–0.2)
BASOPHILS NFR BLD: 0.3 % (ref 0–1.9)
BUN SERPL-MCNC: 10 MG/DL (ref 6–20)
BUN SERPL-MCNC: 10 MG/DL (ref 6–20)
BUN SERPL-MCNC: 11 MG/DL (ref 6–20)
BUN SERPL-MCNC: 11 MG/DL (ref 6–20)
BUN SERPL-MCNC: 13 MG/DL (ref 6–20)
CALCIUM SERPL-MCNC: 8.6 MG/DL (ref 8.7–10.5)
CALCIUM SERPL-MCNC: 9 MG/DL (ref 8.7–10.5)
CALCIUM SERPL-MCNC: 9.1 MG/DL (ref 8.7–10.5)
CALCIUM SERPL-MCNC: 9.2 MG/DL (ref 8.7–10.5)
CALCIUM SERPL-MCNC: 9.3 MG/DL (ref 8.7–10.5)
CHLORIDE SERPL-SCNC: 84 MMOL/L (ref 95–110)
CHLORIDE SERPL-SCNC: 84 MMOL/L (ref 95–110)
CHLORIDE SERPL-SCNC: 85 MMOL/L (ref 95–110)
CHLORIDE SERPL-SCNC: 86 MMOL/L (ref 95–110)
CHLORIDE SERPL-SCNC: 86 MMOL/L (ref 95–110)
CO2 SERPL-SCNC: 15 MMOL/L (ref 23–29)
CO2 SERPL-SCNC: 18 MMOL/L (ref 23–29)
CO2 SERPL-SCNC: 19 MMOL/L (ref 23–29)
CO2 SERPL-SCNC: 21 MMOL/L (ref 23–29)
CO2 SERPL-SCNC: 21 MMOL/L (ref 23–29)
CORTIS SERPL-MCNC: 28.2 UG/DL
CREAT SERPL-MCNC: 0.7 MG/DL (ref 0.5–1.4)
CREAT SERPL-MCNC: 0.7 MG/DL (ref 0.5–1.4)
CREAT SERPL-MCNC: 0.8 MG/DL (ref 0.5–1.4)
DIFFERENTIAL METHOD: ABNORMAL
EOSINOPHIL # BLD AUTO: 0.1 K/UL (ref 0–0.5)
EOSINOPHIL NFR BLD: 0.7 % (ref 0–8)
ERYTHROCYTE [DISTWIDTH] IN BLOOD BY AUTOMATED COUNT: 11.6 % (ref 11.5–14.5)
EST. GFR  (AFRICAN AMERICAN): >60 ML/MIN/1.73 M^2
EST. GFR  (NON AFRICAN AMERICAN): >60 ML/MIN/1.73 M^2
GLUCOSE SERPL-MCNC: 110 MG/DL (ref 70–110)
GLUCOSE SERPL-MCNC: 113 MG/DL (ref 70–110)
GLUCOSE SERPL-MCNC: 118 MG/DL (ref 70–110)
GLUCOSE SERPL-MCNC: 126 MG/DL (ref 70–110)
GLUCOSE SERPL-MCNC: 138 MG/DL (ref 70–110)
HCT VFR BLD AUTO: 42.2 % (ref 40–54)
HGB BLD-MCNC: 14.6 G/DL (ref 14–18)
IMM GRANULOCYTES # BLD AUTO: 0.06 K/UL (ref 0–0.04)
IMM GRANULOCYTES NFR BLD AUTO: 0.5 % (ref 0–0.5)
LYMPHOCYTES # BLD AUTO: 1.3 K/UL (ref 1–4.8)
LYMPHOCYTES NFR BLD: 9.9 % (ref 18–48)
MCH RBC QN AUTO: 29.9 PG (ref 27–31)
MCHC RBC AUTO-ENTMCNC: 34.6 G/DL (ref 32–36)
MCV RBC AUTO: 87 FL (ref 82–98)
MONOCYTES # BLD AUTO: 1.6 K/UL (ref 0.3–1)
MONOCYTES NFR BLD: 11.9 % (ref 4–15)
NEUTROPHILS # BLD AUTO: 10.1 K/UL (ref 1.8–7.7)
NEUTROPHILS NFR BLD: 76.7 % (ref 38–73)
NRBC BLD-RTO: 0 /100 WBC
PLATELET # BLD AUTO: 252 K/UL (ref 150–350)
PMV BLD AUTO: 9.2 FL (ref 9.2–12.9)
POTASSIUM SERPL-SCNC: 4 MMOL/L (ref 3.5–5.1)
POTASSIUM SERPL-SCNC: 4.1 MMOL/L (ref 3.5–5.1)
POTASSIUM SERPL-SCNC: 4.2 MMOL/L (ref 3.5–5.1)
POTASSIUM SERPL-SCNC: 4.3 MMOL/L (ref 3.5–5.1)
POTASSIUM SERPL-SCNC: 4.4 MMOL/L (ref 3.5–5.1)
RBC # BLD AUTO: 4.88 M/UL (ref 4.6–6.2)
SODIUM SERPL-SCNC: 114 MMOL/L (ref 136–145)
SODIUM SERPL-SCNC: 115 MMOL/L (ref 136–145)
SODIUM SERPL-SCNC: 116 MMOL/L (ref 136–145)
SODIUM SERPL-SCNC: 117 MMOL/L (ref 136–145)
SODIUM UR-SCNC: 69 MMOL/L (ref 20–250)
WBC # BLD AUTO: 13.14 K/UL (ref 3.9–12.7)

## 2020-05-06 PROCEDURE — 25000003 PHARM REV CODE 250: Performed by: NURSE PRACTITIONER

## 2020-05-06 PROCEDURE — 25000003 PHARM REV CODE 250: Performed by: INTERNAL MEDICINE

## 2020-05-06 PROCEDURE — 83935 ASSAY OF URINE OSMOLALITY: CPT

## 2020-05-06 PROCEDURE — 84295 ASSAY OF SERUM SODIUM: CPT

## 2020-05-06 PROCEDURE — S4991 NICOTINE PATCH NONLEGEND: HCPCS | Performed by: INTERNAL MEDICINE

## 2020-05-06 PROCEDURE — 20000000 HC ICU ROOM

## 2020-05-06 PROCEDURE — 84300 ASSAY OF URINE SODIUM: CPT

## 2020-05-06 PROCEDURE — 36415 COLL VENOUS BLD VENIPUNCTURE: CPT

## 2020-05-06 PROCEDURE — 99233 SBSQ HOSP IP/OBS HIGH 50: CPT | Mod: ,,, | Performed by: INTERNAL MEDICINE

## 2020-05-06 PROCEDURE — 85025 COMPLETE CBC W/AUTO DIFF WBC: CPT

## 2020-05-06 PROCEDURE — 84295 ASSAY OF SERUM SODIUM: CPT | Mod: 91

## 2020-05-06 PROCEDURE — 25000003 PHARM REV CODE 250: Performed by: EMERGENCY MEDICINE

## 2020-05-06 PROCEDURE — 99233 PR SUBSEQUENT HOSPITAL CARE,LEVL III: ICD-10-PCS | Mod: ,,, | Performed by: INTERNAL MEDICINE

## 2020-05-06 PROCEDURE — 80048 BASIC METABOLIC PNL TOTAL CA: CPT | Mod: 91

## 2020-05-06 PROCEDURE — 63600175 PHARM REV CODE 636 W HCPCS: Performed by: INTERNAL MEDICINE

## 2020-05-06 PROCEDURE — 25000003 PHARM REV CODE 250: Performed by: FAMILY MEDICINE

## 2020-05-06 PROCEDURE — 87040 BLOOD CULTURE FOR BACTERIA: CPT | Mod: 59

## 2020-05-06 RX ORDER — TOLVAPTAN 30 MG/1
30 TABLET ORAL ONCE
Status: COMPLETED | OUTPATIENT
Start: 2020-05-06 | End: 2020-05-06

## 2020-05-06 RX ORDER — SODIUM CHLORIDE 9 MG/ML
INJECTION, SOLUTION INTRAVENOUS CONTINUOUS
Status: DISCONTINUED | OUTPATIENT
Start: 2020-05-06 | End: 2020-05-07

## 2020-05-06 RX ORDER — TAMSULOSIN HYDROCHLORIDE 0.4 MG/1
0.4 CAPSULE ORAL 2 TIMES DAILY
Status: DISCONTINUED | OUTPATIENT
Start: 2020-05-06 | End: 2020-05-13 | Stop reason: HOSPADM

## 2020-05-06 RX ORDER — SODIUM BICARBONATE 650 MG/1
1300 TABLET ORAL 3 TIMES DAILY
Status: DISCONTINUED | OUTPATIENT
Start: 2020-05-06 | End: 2020-05-07

## 2020-05-06 RX ORDER — NIFEDIPINE 30 MG/1
30 TABLET, EXTENDED RELEASE ORAL DAILY
Status: DISCONTINUED | OUTPATIENT
Start: 2020-05-06 | End: 2020-05-10

## 2020-05-06 RX ORDER — SODIUM CHLORIDE 9 MG/ML
INJECTION, SOLUTION INTRAVENOUS CONTINUOUS
Status: DISCONTINUED | OUTPATIENT
Start: 2020-05-06 | End: 2020-05-06

## 2020-05-06 RX ADMIN — SODIUM CHLORIDE: 0.9 INJECTION, SOLUTION INTRAVENOUS at 10:05

## 2020-05-06 RX ADMIN — BUSPIRONE HYDROCHLORIDE 5 MG: 5 TABLET ORAL at 09:05

## 2020-05-06 RX ADMIN — Medication 1 PATCH: at 09:05

## 2020-05-06 RX ADMIN — HYDRALAZINE HYDROCHLORIDE 10 MG: 10 TABLET, FILM COATED ORAL at 09:05

## 2020-05-06 RX ADMIN — ALUMINUM HYDROXIDE, MAGNESIUM HYDROXIDE, AND SIMETHICONE 30 ML: 200; 200; 20 SUSPENSION ORAL at 10:05

## 2020-05-06 RX ADMIN — TAMSULOSIN HYDROCHLORIDE 0.4 MG: 0.4 CAPSULE ORAL at 09:05

## 2020-05-06 RX ADMIN — HYDRALAZINE HYDROCHLORIDE 10 MG: 20 INJECTION INTRAMUSCULAR; INTRAVENOUS at 10:05

## 2020-05-06 RX ADMIN — BUSPIRONE HYDROCHLORIDE 5 MG: 5 TABLET ORAL at 02:05

## 2020-05-06 RX ADMIN — PANTOPRAZOLE SODIUM 40 MG: 40 TABLET, DELAYED RELEASE ORAL at 08:05

## 2020-05-06 RX ADMIN — Medication 1 G: at 08:05

## 2020-05-06 RX ADMIN — VERAPAMIL HYDROCHLORIDE 180 MG: 180 TABLET, FILM COATED, EXTENDED RELEASE ORAL at 09:05

## 2020-05-06 RX ADMIN — SODIUM BICARBONATE TAB 650 MG 1300 MG: 650 TAB at 09:05

## 2020-05-06 RX ADMIN — Medication 1 G: at 09:05

## 2020-05-06 RX ADMIN — ASPIRIN 81 MG 81 MG: 81 TABLET ORAL at 09:05

## 2020-05-06 RX ADMIN — AMLODIPINE BESYLATE 2.5 MG: 2.5 TABLET ORAL at 09:05

## 2020-05-06 RX ADMIN — HYDRALAZINE HYDROCHLORIDE 10 MG: 20 INJECTION INTRAMUSCULAR; INTRAVENOUS at 12:05

## 2020-05-06 RX ADMIN — HYDRALAZINE HYDROCHLORIDE 10 MG: 10 TABLET, FILM COATED ORAL at 05:05

## 2020-05-06 RX ADMIN — HYDRALAZINE HYDROCHLORIDE 10 MG: 10 TABLET, FILM COATED ORAL at 02:05

## 2020-05-06 RX ADMIN — SIMVASTATIN 40 MG: 20 TABLET, FILM COATED ORAL at 09:05

## 2020-05-06 RX ADMIN — CARVEDILOL 25 MG: 12.5 TABLET, FILM COATED ORAL at 09:05

## 2020-05-06 RX ADMIN — TOLVAPTAN 30 MG: 30 TABLET ORAL at 10:05

## 2020-05-06 RX ADMIN — NIFEDIPINE 30 MG: 30 TABLET, FILM COATED, EXTENDED RELEASE ORAL at 02:05

## 2020-05-06 RX ADMIN — SODIUM BICARBONATE TAB 650 MG 1300 MG: 650 TAB at 02:05

## 2020-05-06 RX ADMIN — ALPRAZOLAM 0.5 MG: 0.5 TABLET ORAL at 09:05

## 2020-05-06 NOTE — SUBJECTIVE & OBJECTIVE
Interval History: less anxious appearing. Denies h/o steroid use. Has been on rifampin. Elevated bp. Discontinued lisinopril d/t hyperk. Added hydralazine. neph on board. Started tolvaptan.     Review of Systems   Constitutional: Positive for activity change. Negative for fever.   Respiratory: Positive for shortness of breath (improved). Negative for cough.    Cardiovascular: Positive for chest pain. Negative for leg swelling.   Gastrointestinal: Positive for abdominal pain. Negative for nausea and vomiting.   Genitourinary: Negative for difficulty urinating.   Musculoskeletal: Positive for myalgias and neck pain.   Skin: Negative for wound.   Psychiatric/Behavioral: The patient is nervous/anxious.      Objective:     Vital Signs (Most Recent):  Temp: 99.2 °F (37.3 °C) (05/06/20 0818)  Pulse: 84 (05/06/20 0818)  Resp: 18 (05/06/20 0818)  BP: (!) 189/102 (05/06/20 0818)  SpO2: 97 % (05/06/20 0818) Vital Signs (24h Range):  Temp:  [97.9 °F (36.6 °C)-99.2 °F (37.3 °C)] 99.2 °F (37.3 °C)  Pulse:  [84-98] 84  Resp:  [18-20] 18  SpO2:  [96 %-99 %] 97 %  BP: (160-189)/() 189/102     Weight: 91 kg (200 lb 9.9 oz)  Body mass index is 26.47 kg/m².    Intake/Output Summary (Last 24 hours) at 5/6/2020 0939  Last data filed at 5/6/2020 0700  Gross per 24 hour   Intake 1355.5 ml   Output 2070 ml   Net -714.5 ml      Physical Exam   Constitutional: He is oriented to person, place, and time. He appears well-nourished. He appears distressed.   HENT:   Head: Normocephalic and atraumatic.   Eyes: Pupils are equal, round, and reactive to light. EOM are normal.   Neck: Normal range of motion.   Cardiovascular: Normal rate.   Pulmonary/Chest: Effort normal. No respiratory distress.   Abdominal: There is tenderness.   Neurological: He is alert and oriented to person, place, and time.   Skin: He is diaphoretic.   Facial flushing c/w rosacea    Psychiatric: His speech is normal. His mood appears anxious. Cognition and memory are  impaired.   Vitals reviewed.      Significant Labs:   Blood Culture: No results for input(s): LABBLOO in the last 48 hours.  CBC:   Recent Labs   Lab 05/04/20  1500 05/05/20  0845 05/06/20  0856   WBC 10.61 14.37* 13.14*   HGB 14.3 14.7 14.6   HCT 41.4 43.0 42.2    294 252     CMP:   Recent Labs   Lab 05/04/20  1500  05/05/20  2345 05/06/20  0346 05/06/20  0823   *   < > 117* 116* 115*   K 4.4   < > 4.0 4.2 4.3   CL 88*   < > 84* 84* 86*   CO2 20*   < > 21* 21* 19*   *   < > 118* 110 113*   BUN 11   < > 10 10 11   CREATININE 1.0   < > 0.8 0.8 0.7   CALCIUM 9.3   < > 9.3 9.2 9.1   PROT 7.5  --   --   --   --    ALBUMIN 4.3  --   --   --   --    BILITOT 1.1*  --   --   --   --    ALKPHOS 95  --   --   --   --    AST 21  --   --   --   --    ALT 27  --   --   --   --    ANIONGAP 13   < > 12 11 10   EGFRNONAA >60   < > >60 >60 >60    < > = values in this interval not displayed.       Significant Imaging: CXR: I have reviewed all pertinent results/findings within the past 24 hours and my personal findings are:  no acute cardiopulm process

## 2020-05-06 NOTE — PROGRESS NOTES
[facility] Hospital Medicine  Progress Note    Patient Name: Juwan Pozo  MRN: [id number]  Patient Class: IP- Inpatient   Admission Date: 5/4/2020  Length of Stay: 2 days  Attending Physician: Geoff Daniel MD  Primary Care Provider: Primary Doctor No        Subjective:     Principal Problem:Hyponatremia        HPI:  Mr. Pozo is a 59-year-old  female with PMH significant for hypertension, anxiety disorder, presented to the ED complaining of just not feeling well since yesterday afternoon.  Patient reports vague symptoms like anxiety, dry mouth, bilateral lower extremity numbness, chest discomfort.  Denies fever, chills.  Afebrile.  Blood blood pressure 194/92, received labetalol 10 mg IV x1, with improvement in blood pressure to 176/93.  Laboratory workup reveals sodium 121, chloride 88, CO2 20.  CT head unremarkable.  Rest of the laboratory workup unremarkable.  Chest x-ray without infiltrates, masses or effusions.  COVID 19 test negative.    Admitting diagnosis hyponatremia, likely volume depletion related.    Overview/Hospital Course:  5/5: continues to exhibit anxiety. Continues to have nonspecific c/o difficulties taking deep breath, muscle spasms and abdominal discomfort. Was taking abx for abscess. Drained by derm and started on rifampin. Reports stopping lisinopril d/t hypotension. Denies otc supplements. Reports started to workout 1-2 months ago and taking whey protein but no other supplementation. Denies any recent changes to home meds except abx.  5/6: anxiety improving. Na no improvement with NaCl tablets.  On tele. Slight increase in wbc, will obtain blood cx. Afebrile, cxr, and ct head wnl. neph consulted. tolvaptan started. Denies recent use of steroids but was on rifampin for axillary abscesses. Awaiting cortisol and acth    Interval History: less anxious appearing. Denies h/o steroid use. Has been on rifampin. Elevated bp. Discontinued lisinopril d/t hyperk. Added  hydralazine. neph on board. Started tolvaptan.     Review of Systems   Constitutional: Positive for activity change. Negative for fever.   Respiratory: Positive for shortness of breath (improved). Negative for cough.    Cardiovascular: Positive for chest pain. Negative for leg swelling.   Gastrointestinal: Positive for abdominal pain. Negative for nausea and vomiting.   Genitourinary: Negative for difficulty urinating.   Musculoskeletal: Positive for myalgias and neck pain.   Skin: Negative for wound.   Psychiatric/Behavioral: The patient is nervous/anxious.      Objective:     Vital Signs (Most Recent):  Temp: 99.2 °F (37.3 °C) (05/06/20 0818)  Pulse: 84 (05/06/20 0818)  Resp: 18 (05/06/20 0818)  BP: (!) 189/102 (05/06/20 0818)  SpO2: 97 % (05/06/20 0818) Vital Signs (24h Range):  Temp:  [97.9 °F (36.6 °C)-99.2 °F (37.3 °C)] 99.2 °F (37.3 °C)  Pulse:  [84-98] 84  Resp:  [18-20] 18  SpO2:  [96 %-99 %] 97 %  BP: (160-189)/() 189/102     Weight: 91 kg (200 lb 9.9 oz)  Body mass index is 26.47 kg/m².    Intake/Output Summary (Last 24 hours) at 5/6/2020 0939  Last data filed at 5/6/2020 0700  Gross per 24 hour   Intake 1355.5 ml   Output 2070 ml   Net -714.5 ml      Physical Exam   Constitutional: He is oriented to person, place, and time. He appears well-nourished. He appears distressed.   HENT:   Head: Normocephalic and atraumatic.   Eyes: Pupils are equal, round, and reactive to light. EOM are normal.   Neck: Normal range of motion.   Cardiovascular: Normal rate.   Pulmonary/Chest: Effort normal. No respiratory distress.   Abdominal: There is tenderness.   Neurological: He is alert and oriented to person, place, and time.   Skin: He is diaphoretic.   Facial flushing c/w rosacea    Psychiatric: His speech is normal. His mood appears anxious. Cognition and memory are impaired.   Vitals reviewed.      Significant Labs:   Blood Culture: No results for input(s): LABBLOO in the last 48 hours.  CBC:   Recent Labs    Lab 05/04/20  1500 05/05/20  0845 05/06/20  0856   WBC 10.61 14.37* 13.14*   HGB 14.3 14.7 14.6   HCT 41.4 43.0 42.2    294 252     CMP:   Recent Labs   Lab 05/04/20  1500  05/05/20  2345 05/06/20  0346 05/06/20  0823   *   < > 117* 116* 115*   K 4.4   < > 4.0 4.2 4.3   CL 88*   < > 84* 84* 86*   CO2 20*   < > 21* 21* 19*   *   < > 118* 110 113*   BUN 11   < > 10 10 11   CREATININE 1.0   < > 0.8 0.8 0.7   CALCIUM 9.3   < > 9.3 9.2 9.1   PROT 7.5  --   --   --   --    ALBUMIN 4.3  --   --   --   --    BILITOT 1.1*  --   --   --   --    ALKPHOS 95  --   --   --   --    AST 21  --   --   --   --    ALT 27  --   --   --   --    ANIONGAP 13   < > 12 11 10   EGFRNONAA >60   < > >60 >60 >60    < > = values in this interval not displayed.       Significant Imaging: CXR: I have reviewed all pertinent results/findings within the past 24 hours and my personal findings are:  no acute cardiopulm process      Assessment/Plan:      * Hyponatremia  Serum sodium 121, with chloride 88, suggesting volume depletion.  Patient clinically appears dry.  Follow-up on urine osmolality.  States that he drinks 4-5 glasses of water per day, denies excessive free water intake.  Continue normal saline at 75 cc/hour for 1 L.  BMP every 6 hr x4.  Avoid over-correction, no more than 8-10 mEq sodium correction per day.  Hold SSRI.    5/5  Stat labs  Urine osm pending  Urine Na 132  BNP wnl  tsh wnl  Continue ivf @75  No recent changes to depression meds. Holding SSRI  Restart oral xanax and d/c ativan  Avoid overcorrection of na  Seizure precautions    5/6  Worsening to 115  Likely siadh given h/o use of drugs causing siadh  ddx adrenal insufficiency given h/o rifampin  Awaiting cortisol and acth  Started salt tablets bid  Fluid restriction to 1L  D/c ivf  neph consulted   Started tolvaptan        Leukocytosis  5/6  Trending down  Likely reactionary  Will obtain blood cx  No abx at this time  Afebrile, cxr wnl, ua  clean      Hypomagnesemia  replete      Hypertension, essential  5/5  Elevated  On carvedilol and verapamil  lisinopril discontinued outpt  Will resume  On tele     5/6  Discontinued lisinopril d/t elevated K  Continue carvedilol and verapamil  Added hydralazine      Tobacco use  Nicotine patch      Anxiety disorder  Patient appears severely anxious.    Ativan 1 mg IV q.4 hours as needed.    5/5  Anxiety persists  Resume home meds  C/o difficulties taking deep breath and chest and body tightness and numbness  Ekg, Cxr and trops on admission wnl  On tele    5/6  Discontinued prozac and amitriptyline   Decreasing use of xanax, known to cause siadh  Limited to 0.5mg qhs  Added buspar          VTE Risk Mitigation (From admission, onward)         Ordered     Place sequential compression device  Until discontinued      05/04/20 8058                      Nirav Duffy MD  Department of Hospital Medicine   [facility]

## 2020-05-06 NOTE — PROGRESS NOTES
Pt alert and oriented. Pt is very anxious. Breathing exercises done. Pt with generalized aches and pains upon moving up in bed. Pt  Using urinal.

## 2020-05-06 NOTE — PLAN OF CARE
Pt AAOx4. VSS, except for Blood pressure. Pt systolic blood pressure has been in the 170s-180s. Pt remained free of falls this shift. Pt. Sodium lab levels have came back critical. provider notified of critical lab values. Lab draws are scheduled for every 4 hours. No complaints of pain or discomfort. Medications administered as ordered. Pt prn medication for BP was also administered, Pt is normal sinus the on monitor. PIV intact, no redness or swelling. Hourly rounding completed. Pt instructed to call for assistance. POC reviewed. Pt verbalized understanding. But through observation is very anxious and irritable, we administered PRN Xanax and scheduled Buspar. Will continue to monitor.

## 2020-05-06 NOTE — PROGRESS NOTES
"Pt states, " I feel them pushing from out there." pt was referring to people in the beard way. Pt noted talking to self. C/o tingling to feet. hydralaziner 10mg ivp prn sbp>170  "

## 2020-05-06 NOTE — NURSING
Spoke with patient's wife and sister for an update on patient status since admission. Family updated on patient status. Family called back with concerns about patient's WBC being elevated reported by dayshift nurse. Call team notified of concerns. Yun Cassidy NP reassuring patient is asymptomatic currently with no fever noted and verifying patient has received a round of abx for abscess to arm with a negative chest xray and negative UA and will pass on to dayshift to assess for need for BCs. Will continue to monitor patient closely.

## 2020-05-06 NOTE — ASSESSMENT & PLAN NOTE
1.  Hyponatremia, recent serum sodium is 114,  SIADH, likely from his medications, was on Fluxetine prior to admission,    elevated urine osmolality and serum uric acid less than 4  consistent with diagnosis of SIADH,     patient be placed on fluid restriction of 1 L per day, check serum sodium level every 4 hr, received a dose of tolvaptan,     2.  Hypertension, change amlodipine to Nifedipine     3.  Stable renal fn, serum cr 0.8 mg/dl     4.  Metabolic acidosis, start sodium bicarbonate supplements,

## 2020-05-06 NOTE — PROGRESS NOTES
Pt bathed. Linen changed. Dr. Duffy notified that pt is retaining some urine. Will give pt more chance to void . Orders on chart. Pt lying on left side.

## 2020-05-06 NOTE — ASSESSMENT & PLAN NOTE
Serum sodium 121, with chloride 88, suggesting volume depletion.  Patient clinically appears dry.  Follow-up on urine osmolality.  States that he drinks 4-5 glasses of water per day, denies excessive free water intake.  Continue normal saline at 75 cc/hour for 1 L.  BMP every 6 hr x4.  Avoid over-correction, no more than 8-10 mEq sodium correction per day.  Hold SSRI.    5/5  Stat labs  Urine osm pending  Urine Na 132  BNP wnl  tsh wnl  Continue ivf @75  No recent changes to depression meds. Holding SSRI  Restart oral xanax and d/c ativan  Avoid overcorrection of na  Seizure precautions    5/6  Worsening to 115  Likely siadh given h/o use of drugs causing siadh  ddx adrenal insufficiency given h/o rifampin  Awaiting cortisol and acth  Started salt tablets bid  Fluid restriction to 1L  D/c ivf  neph consulted   Started tolvaptan

## 2020-05-06 NOTE — ASSESSMENT & PLAN NOTE
5/5  Elevated  On carvedilol and verapamil  lisinopril discontinued outpt  Will resume  On tele     5/6  Discontinued lisinopril d/t elevated K  Continue carvedilol and verapamil  Added hydralazine

## 2020-05-06 NOTE — SUBJECTIVE & OBJECTIVE
Interval History:  No acute events,     Review of patient's allergies indicates:   Allergen Reactions    Codeine     Penicillins      Current Facility-Administered Medications   Medication Frequency    acetaminophen tablet 650 mg Q6H PRN    albuterol-ipratropium 2.5 mg-0.5 mg/3 mL nebulizer solution 3 mL Q4H PRN    ALPRAZolam tablet 0.5 mg Nightly PRN    aluminum-magnesium hydroxide-simethicone 200-200-20 mg/5 mL suspension 30 mL Q6H PRN    aluminum-magnesium hydroxide-simethicone 200-200-20 mg/5 mL suspension 30 mL Q6H PRN    aspirin chewable tablet 81 mg Daily    busPIRone tablet 5 mg TID    carvediloL tablet 25 mg BID    cyclobenzaprine tablet 10 mg TID PRN    diphenhydrAMINE capsule 25 mg Q6H PRN    guaifenesin 100 mg/5 ml syrup 200 mg Q4H PRN    hydrALAZINE injection 10 mg Q6H PRN    hydrALAZINE tablet 10 mg Q8H    influenza (QUADRIVALENT PF) vaccine 0.5 mL vaccine x 1 dose    nicotine 21 mg/24 hr 1 patch Daily    NIFEdipine 24 hr tablet 30 mg Daily    ondansetron injection 4 mg Q8H PRN    pantoprazole EC tablet 40 mg Daily    simvastatin tablet 40 mg QHS    sodium chloride tablet 1 g BID    tamsulosin 24 hr capsule 0.4 mg BID    verapamiL CR tablet 180 mg Daily       Objective:     Vital Signs (Most Recent):  Temp: 98.3 °F (36.8 °C) (05/06/20 1140)  Pulse: 93 (05/06/20 1342)  Resp: 18 (05/06/20 1140)  BP: (!) 183/85 (05/06/20 1342)  SpO2: 96 % (05/06/20 1140)  O2 Device (Oxygen Therapy): nasal cannula (05/05/20 2041) Vital Signs (24h Range):  Temp:  [98.3 °F (36.8 °C)-99.2 °F (37.3 °C)] 98.3 °F (36.8 °C)  Pulse:  [84-98] 93  Resp:  [18-20] 18  SpO2:  [96 %-99 %] 96 %  BP: (160-189)/() 183/85     Weight: 91 kg (200 lb 9.9 oz) (05/06/20 0500)  Body mass index is 26.47 kg/m².  Body surface area is 2.16 meters squared.    I/O last 3 completed shifts:  In: 1475.5 [P.O.:1140; I.V.:335.5]  Out: 2670 [Urine:2670]    Physical Exam   Constitutional: He is oriented to person, place, and  time. He appears well-developed and well-nourished. No distress.   HENT:   Head: Normocephalic and atraumatic.   Eyes: Pupils are equal, round, and reactive to light.   Neck: Normal range of motion. Neck supple. No tracheal deviation present. No thyromegaly present.   Cardiovascular: Normal rate, regular rhythm, normal heart sounds and intact distal pulses. Exam reveals no gallop and no friction rub.   No murmur heard.  Pulmonary/Chest: Effort normal and breath sounds normal. He has no wheezes. He has no rales.   Abdominal: Soft. He exhibits no mass. There is no tenderness. There is no rebound and no guarding.   Musculoskeletal: Normal range of motion. He exhibits no edema.   Lymphadenopathy:     He has no cervical adenopathy.   Neurological: He is alert and oriented to person, place, and time.   Skin: Skin is warm. No rash noted. He is not diaphoretic. No erythema.   Nursing note and vitals reviewed.      Significant Labs:  CBC:   Recent Labs   Lab 05/06/20  0856   WBC 13.14*   RBC 4.88   HGB 14.6   HCT 42.2      MCV 87   MCH 29.9   MCHC 34.6     CMP:   Recent Labs   Lab 05/04/20  1500  05/06/20  1239   *   < > 126*   CALCIUM 9.3   < > 8.6*   ALBUMIN 4.3  --   --    PROT 7.5  --   --    *   < > 114*   K 4.4   < > 4.4   CO2 20*   < > 15*   CL 88*   < > 85*   BUN 11   < > 13   CREATININE 1.0   < > 0.7   ALKPHOS 95  --   --    ALT 27  --   --    AST 21  --   --    BILITOT 1.1*  --   --     < > = values in this interval not displayed.     Coagulation: No results for input(s): PT, INR, APTT in the last 168 hours.  LFTs:   Recent Labs   Lab 05/04/20  1500   ALT 27   AST 21   ALKPHOS 95   BILITOT 1.1*   PROT 7.5   ALBUMIN 4.3     All labs within the past 24 hours have been reviewed.     Significant Imaging:  Reviewed    Lab Results   Component Value Date    CALCIUM 8.6 (L) 05/06/2020    PHOS 3.3 05/05/2020     Lab Results   Component Value Date    URICACID 3.3 (L) 05/05/2020

## 2020-05-06 NOTE — PROGRESS NOTES
Pt bathed. Linen changed. hibiclense to skin. Tolerated. Cooperative. Pt holding urinal to penis. Pt voided 300cc kevin urine. Pt states he thought he needed to go more. Bladder scan done >320. Pt c/o pressure to suprapubic area. Message sent to dr. Duffy.

## 2020-05-06 NOTE — ASSESSMENT & PLAN NOTE
5/6  Trending down  Likely reactionary  Will obtain blood cx  No abx at this time  Afebrile, cxr wnl, ua clean

## 2020-05-06 NOTE — PROGRESS NOTES
Instructed by dr. Duffy to give lasix iv before kayexalate. Check potassium in bmp for 2000. Night shift rn notified. If potassium >5.4 given oral kayexalate

## 2020-05-06 NOTE — PLAN OF CARE
05/06/20 0936   Medicare Message   Important Message from Medicare regarding Discharge Appeal Rights Explained to patient/caregiver   Date IMM was signed 05/06/20   Time IMM was signed 0940

## 2020-05-06 NOTE — PROGRESS NOTES
Sodium was not drawn. Lab called. Orders are in the computer. Lab can not see. New iv to pt rt arm x 1 attempt with a 20g. Blood drawn for lab. Tolerated. Pt resting quielty.

## 2020-05-06 NOTE — PROGRESS NOTES
[facility] -   Nephrology  Progress Note    Patient Name: Juwan Pozo  MRN: [id number]  Admission Date: 5/4/2020  Hospital Length of Stay: 2 days  Attending Provider: Geoff Daniel MD   Primary Care Physician: Primary Doctor No  Principal Problem:Hyponatremia    Subjective:     HPI: Juwan Pozo is a 59-year-old  man with history of hypertension, anxiety, PTSD, Major depression, on medications including Prozac, patient was admitted to the hospital yesterday for generalized weakness, his serum sodium on presentation was 121, initially thought that he might be dehydrated hydration was started on IV fluids, his serum sodium decreased to 117, we were consulted  because of worsening hyponatremia, renal function is stable with serum creatinine 0.8 mg/dL,    Interval History:  No acute events,     Review of patient's allergies indicates:   Allergen Reactions    Codeine     Penicillins      Current Facility-Administered Medications   Medication Frequency    acetaminophen tablet 650 mg Q6H PRN    albuterol-ipratropium 2.5 mg-0.5 mg/3 mL nebulizer solution 3 mL Q4H PRN    ALPRAZolam tablet 0.5 mg Nightly PRN    aluminum-magnesium hydroxide-simethicone 200-200-20 mg/5 mL suspension 30 mL Q6H PRN    aluminum-magnesium hydroxide-simethicone 200-200-20 mg/5 mL suspension 30 mL Q6H PRN    aspirin chewable tablet 81 mg Daily    busPIRone tablet 5 mg TID    carvediloL tablet 25 mg BID    cyclobenzaprine tablet 10 mg TID PRN    diphenhydrAMINE capsule 25 mg Q6H PRN    guaifenesin 100 mg/5 ml syrup 200 mg Q4H PRN    hydrALAZINE injection 10 mg Q6H PRN    hydrALAZINE tablet 10 mg Q8H    influenza (QUADRIVALENT PF) vaccine 0.5 mL vaccine x 1 dose    nicotine 21 mg/24 hr 1 patch Daily    NIFEdipine 24 hr tablet 30 mg Daily    ondansetron injection 4 mg Q8H PRN    pantoprazole EC tablet 40 mg Daily    simvastatin tablet 40 mg QHS    sodium chloride tablet 1 g BID    tamsulosin 24 hr  capsule 0.4 mg BID    verapamiL CR tablet 180 mg Daily       Objective:     Vital Signs (Most Recent):  Temp: 98.3 °F (36.8 °C) (05/06/20 1140)  Pulse: 93 (05/06/20 1342)  Resp: 18 (05/06/20 1140)  BP: (!) 183/85 (05/06/20 1342)  SpO2: 96 % (05/06/20 1140)  O2 Device (Oxygen Therapy): nasal cannula (05/05/20 2041) Vital Signs (24h Range):  Temp:  [98.3 °F (36.8 °C)-99.2 °F (37.3 °C)] 98.3 °F (36.8 °C)  Pulse:  [84-98] 93  Resp:  [18-20] 18  SpO2:  [96 %-99 %] 96 %  BP: (160-189)/() 183/85     Weight: 91 kg (200 lb 9.9 oz) (05/06/20 0500)  Body mass index is 26.47 kg/m².  Body surface area is 2.16 meters squared.    I/O last 3 completed shifts:  In: 1475.5 [P.O.:1140; I.V.:335.5]  Out: 2670 [Urine:2670]    Physical Exam   Constitutional: He is oriented to person, place, and time. He appears well-developed and well-nourished. No distress.   HENT:   Head: Normocephalic and atraumatic.   Eyes: Pupils are equal, round, and reactive to light.   Neck: Normal range of motion. Neck supple. No tracheal deviation present. No thyromegaly present.   Cardiovascular: Normal rate, regular rhythm, normal heart sounds and intact distal pulses. Exam reveals no gallop and no friction rub.   No murmur heard.  Pulmonary/Chest: Effort normal and breath sounds normal. He has no wheezes. He has no rales.   Abdominal: Soft. He exhibits no mass. There is no tenderness. There is no rebound and no guarding.   Musculoskeletal: Normal range of motion. He exhibits no edema.   Lymphadenopathy:     He has no cervical adenopathy.   Neurological: He is alert and oriented to person, place, and time.   Skin: Skin is warm. No rash noted. He is not diaphoretic. No erythema.   Nursing note and vitals reviewed.      Significant Labs:  CBC:   Recent Labs   Lab 05/06/20  0856   WBC 13.14*   RBC 4.88   HGB 14.6   HCT 42.2      MCV 87   MCH 29.9   MCHC 34.6     CMP:   Recent Labs   Lab 05/04/20  1500  05/06/20  1239   *   < > 126*   CALCIUM  9.3   < > 8.6*   ALBUMIN 4.3  --   --    PROT 7.5  --   --    *   < > 114*   K 4.4   < > 4.4   CO2 20*   < > 15*   CL 88*   < > 85*   BUN 11   < > 13   CREATININE 1.0   < > 0.7   ALKPHOS 95  --   --    ALT 27  --   --    AST 21  --   --    BILITOT 1.1*  --   --     < > = values in this interval not displayed.     Coagulation: No results for input(s): PT, INR, APTT in the last 168 hours.  LFTs:   Recent Labs   Lab 05/04/20  1500   ALT 27   AST 21   ALKPHOS 95   BILITOT 1.1*   PROT 7.5   ALBUMIN 4.3     All labs within the past 24 hours have been reviewed.     Significant Imaging:  Reviewed    Lab Results   Component Value Date    CALCIUM 8.6 (L) 05/06/2020    PHOS 3.3 05/05/2020     Lab Results   Component Value Date    URICACID 3.3 (L) 05/05/2020         Assessment/Plan:     * Hyponatremia  1.  Hyponatremia, recent serum sodium is 114,  SIADH, likely from his medications, was on Fluxetine prior to admission,    elevated urine osmolality and serum uric acid less than 4  consistent with diagnosis of SIADH,     patient be placed on fluid restriction of 1 L per day, check serum sodium level every 4 hr, received a dose of tolvaptan,     2.  Hypertension, change amlodipine to Nifedipine     3.  Stable renal fn, serum cr 0.8 mg/dl     4.  Metabolic acidosis, start sodium bicarbonate supplements,            Thank you for your consult. I will follow-up with patient. Please contact us if you have any additional questions.     Total time spent 40 minutes including time needed to review the records,  patient  evaluation, documentation, face-to-face discussion with the patient, primary team,   more than 50% of the time was spent on coordination of care and counseling.       Rey Benitez MD  Nephrology  [facility] -

## 2020-05-06 NOTE — CONSULTS
"Consulted on this 60 y/o M patient due to wounds to bilateral axilla. Patient admitted with hyponatremia. He has PMH significant for depression, HTN. He is awake and alert, disoriented to situation. He reports 1-2week hx of abscesses to bilateral axilla and reports left was "cut open" a week or 2 ago and they "squeezed it a lot". Left axilla has a firm nodule measuring approximately 1.5x1.5cm noted that is moveable, deep red discoloration surrounding does not appear to be acute cellulitis. No opening appreciated and no drainage. Cleansed with saline and attempted to express drainage with none noted. Covered with foam dressing. Right axilla also has a firm nodule noted, measuring less than 1x1cm with no drainage noted, also moveable. Left OMARI.  Unknown etiology of nodules, firm abscesses vs. Cysts vs. Other etiology, however, both with no inflammation noted. Recommend follow up as OP with general surgery for further work up. If either site becomes actively inflamed or develop s/s abscess, can consult general surgery as IP. Wound care will sign off.   "

## 2020-05-07 LAB
ALBUMIN SERPL BCP-MCNC: 3.5 G/DL (ref 3.5–5.2)
ANION GAP SERPL CALC-SCNC: 10 MMOL/L (ref 8–16)
ANION GAP SERPL CALC-SCNC: 14 MMOL/L (ref 8–16)
BUN SERPL-MCNC: 11 MG/DL (ref 6–20)
BUN SERPL-MCNC: 13 MG/DL (ref 6–20)
BUN SERPL-MCNC: 13 MG/DL (ref 6–20)
BUN SERPL-MCNC: 14 MG/DL (ref 6–20)
BUN SERPL-MCNC: 14 MG/DL (ref 6–20)
CALCIUM SERPL-MCNC: 8.6 MG/DL (ref 8.7–10.5)
CALCIUM SERPL-MCNC: 8.6 MG/DL (ref 8.7–10.5)
CALCIUM SERPL-MCNC: 8.7 MG/DL (ref 8.7–10.5)
CALCIUM SERPL-MCNC: 8.8 MG/DL (ref 8.7–10.5)
CALCIUM SERPL-MCNC: 9.1 MG/DL (ref 8.7–10.5)
CHLORIDE SERPL-SCNC: 85 MMOL/L (ref 95–110)
CHLORIDE SERPL-SCNC: 86 MMOL/L (ref 95–110)
CHLORIDE SERPL-SCNC: 88 MMOL/L (ref 95–110)
CHLORIDE SERPL-SCNC: 90 MMOL/L (ref 95–110)
CHLORIDE SERPL-SCNC: 94 MMOL/L (ref 95–110)
CO2 SERPL-SCNC: 20 MMOL/L (ref 23–29)
CO2 SERPL-SCNC: 21 MMOL/L (ref 23–29)
CO2 SERPL-SCNC: 21 MMOL/L (ref 23–29)
CO2 SERPL-SCNC: 22 MMOL/L (ref 23–29)
CO2 SERPL-SCNC: 22 MMOL/L (ref 23–29)
CREAT SERPL-MCNC: 0.7 MG/DL (ref 0.5–1.4)
CREAT SERPL-MCNC: 0.7 MG/DL (ref 0.5–1.4)
CREAT SERPL-MCNC: 0.8 MG/DL (ref 0.5–1.4)
DIGOXIN SERPL-MCNC: <0.1 NG/ML (ref 0.8–2)
EST. GFR  (AFRICAN AMERICAN): >60 ML/MIN/1.73 M^2
EST. GFR  (NON AFRICAN AMERICAN): >60 ML/MIN/1.73 M^2
GLUCOSE SERPL-MCNC: 116 MG/DL (ref 70–110)
GLUCOSE SERPL-MCNC: 118 MG/DL (ref 70–110)
GLUCOSE SERPL-MCNC: 120 MG/DL (ref 70–110)
GLUCOSE SERPL-MCNC: 121 MG/DL (ref 70–110)
GLUCOSE SERPL-MCNC: 126 MG/DL (ref 70–110)
MAGNESIUM SERPL-MCNC: 2.1 MG/DL (ref 1.6–2.6)
OSMOLALITY UR: 658 MOSM/KG (ref 50–1200)
PHOSPHATE SERPL-MCNC: 2.8 MG/DL (ref 2.7–4.5)
POTASSIUM SERPL-SCNC: 3.9 MMOL/L (ref 3.5–5.1)
POTASSIUM SERPL-SCNC: 3.9 MMOL/L (ref 3.5–5.1)
POTASSIUM SERPL-SCNC: 4 MMOL/L (ref 3.5–5.1)
POTASSIUM SERPL-SCNC: 4.2 MMOL/L (ref 3.5–5.1)
POTASSIUM SERPL-SCNC: 4.3 MMOL/L (ref 3.5–5.1)
POTASSIUM SERPL-SCNC: 4.4 MMOL/L (ref 3.5–5.1)
SODIUM SERPL-SCNC: 118 MMOL/L (ref 136–145)
SODIUM SERPL-SCNC: 118 MMOL/L (ref 136–145)
SODIUM SERPL-SCNC: 119 MMOL/L (ref 136–145)
SODIUM SERPL-SCNC: 121 MMOL/L (ref 136–145)
SODIUM SERPL-SCNC: 121 MMOL/L (ref 136–145)
SODIUM SERPL-SCNC: 126 MMOL/L (ref 136–145)
SODIUM UR-SCNC: <20 MMOL/L (ref 20–250)
TSH SERPL DL<=0.005 MIU/L-ACNC: 0.53 UIU/ML (ref 0.4–4)
URATE SERPL-MCNC: 4.2 MG/DL (ref 3.4–7)

## 2020-05-07 PROCEDURE — S4991 NICOTINE PATCH NONLEGEND: HCPCS | Performed by: INTERNAL MEDICINE

## 2020-05-07 PROCEDURE — 93005 ELECTROCARDIOGRAM TRACING: CPT

## 2020-05-07 PROCEDURE — 80048 BASIC METABOLIC PNL TOTAL CA: CPT | Mod: 91

## 2020-05-07 PROCEDURE — 84132 ASSAY OF SERUM POTASSIUM: CPT

## 2020-05-07 PROCEDURE — A4216 STERILE WATER/SALINE, 10 ML: HCPCS | Performed by: INTERNAL MEDICINE

## 2020-05-07 PROCEDURE — 25000003 PHARM REV CODE 250: Performed by: INTERNAL MEDICINE

## 2020-05-07 PROCEDURE — 63600175 PHARM REV CODE 636 W HCPCS: Performed by: INTERNAL MEDICINE

## 2020-05-07 PROCEDURE — 25000003 PHARM REV CODE 250: Performed by: EMERGENCY MEDICINE

## 2020-05-07 PROCEDURE — 83735 ASSAY OF MAGNESIUM: CPT

## 2020-05-07 PROCEDURE — 25000003 PHARM REV CODE 250: Performed by: NURSE PRACTITIONER

## 2020-05-07 PROCEDURE — 84443 ASSAY THYROID STIM HORMONE: CPT

## 2020-05-07 PROCEDURE — 99291 CRITICAL CARE FIRST HOUR: CPT | Mod: ,,, | Performed by: INTERNAL MEDICINE

## 2020-05-07 PROCEDURE — 84550 ASSAY OF BLOOD/URIC ACID: CPT

## 2020-05-07 PROCEDURE — 80162 ASSAY OF DIGOXIN TOTAL: CPT

## 2020-05-07 PROCEDURE — 99291 PR CRITICAL CARE, E/M 30-74 MINUTES: ICD-10-PCS | Mod: ,,, | Performed by: INTERNAL MEDICINE

## 2020-05-07 PROCEDURE — 25000003 PHARM REV CODE 250: Performed by: FAMILY MEDICINE

## 2020-05-07 PROCEDURE — C1751 CATH, INF, PER/CENT/MIDLINE: HCPCS

## 2020-05-07 PROCEDURE — 84300 ASSAY OF URINE SODIUM: CPT

## 2020-05-07 PROCEDURE — 93010 EKG 12-LEAD: ICD-10-PCS | Mod: ,,, | Performed by: INTERNAL MEDICINE

## 2020-05-07 PROCEDURE — 36569 INSJ PICC 5 YR+ W/O IMAGING: CPT

## 2020-05-07 PROCEDURE — 20000000 HC ICU ROOM

## 2020-05-07 PROCEDURE — 84295 ASSAY OF SERUM SODIUM: CPT | Mod: 91

## 2020-05-07 PROCEDURE — 93010 ELECTROCARDIOGRAM REPORT: CPT | Mod: ,,, | Performed by: INTERNAL MEDICINE

## 2020-05-07 PROCEDURE — 80069 RENAL FUNCTION PANEL: CPT

## 2020-05-07 PROCEDURE — 84295 ASSAY OF SERUM SODIUM: CPT

## 2020-05-07 RX ORDER — SODIUM CHLORIDE 0.9 % (FLUSH) 0.9 %
10 SYRINGE (ML) INJECTION EVERY 6 HOURS
Status: DISCONTINUED | OUTPATIENT
Start: 2020-05-07 | End: 2020-05-13 | Stop reason: HOSPADM

## 2020-05-07 RX ORDER — ISOSORBIDE DINITRATE AND HYDRALAZINE HYDROCHLORIDE 37.5; 2 MG/1; MG/1
1 TABLET ORAL ONCE
Status: COMPLETED | OUTPATIENT
Start: 2020-05-07 | End: 2020-05-07

## 2020-05-07 RX ORDER — LANOLIN ALCOHOL/MO/W.PET/CERES
400 CREAM (GRAM) TOPICAL 2 TIMES DAILY
Status: DISCONTINUED | OUTPATIENT
Start: 2020-05-07 | End: 2020-05-13 | Stop reason: HOSPADM

## 2020-05-07 RX ORDER — ENOXAPARIN SODIUM 100 MG/ML
40 INJECTION SUBCUTANEOUS EVERY 24 HOURS
Status: DISCONTINUED | OUTPATIENT
Start: 2020-05-07 | End: 2020-05-13 | Stop reason: HOSPADM

## 2020-05-07 RX ORDER — TOLVAPTAN 30 MG/1
30 TABLET ORAL ONCE
Status: COMPLETED | OUTPATIENT
Start: 2020-05-07 | End: 2020-05-07

## 2020-05-07 RX ORDER — HYDRALAZINE HYDROCHLORIDE 20 MG/ML
20 INJECTION INTRAMUSCULAR; INTRAVENOUS EVERY 6 HOURS PRN
Status: DISCONTINUED | OUTPATIENT
Start: 2020-05-07 | End: 2020-05-11

## 2020-05-07 RX ORDER — DEXTROSE MONOHYDRATE 50 MG/ML
INJECTION, SOLUTION INTRAVENOUS CONTINUOUS
Status: DISCONTINUED | OUTPATIENT
Start: 2020-05-07 | End: 2020-05-08

## 2020-05-07 RX ORDER — SODIUM CHLORIDE 0.9 % (FLUSH) 0.9 %
10 SYRINGE (ML) INJECTION
Status: DISCONTINUED | OUTPATIENT
Start: 2020-05-07 | End: 2020-05-13 | Stop reason: HOSPADM

## 2020-05-07 RX ADMIN — BUSPIRONE HYDROCHLORIDE 5 MG: 5 TABLET ORAL at 10:05

## 2020-05-07 RX ADMIN — Medication 400 MG: at 10:05

## 2020-05-07 RX ADMIN — ASPIRIN 81 MG 81 MG: 81 TABLET ORAL at 09:05

## 2020-05-07 RX ADMIN — VERAPAMIL HYDROCHLORIDE 180 MG: 180 TABLET, FILM COATED, EXTENDED RELEASE ORAL at 09:05

## 2020-05-07 RX ADMIN — Medication 10 ML: at 05:05

## 2020-05-07 RX ADMIN — SIMVASTATIN 40 MG: 20 TABLET, FILM COATED ORAL at 10:05

## 2020-05-07 RX ADMIN — Medication 10 ML: at 06:05

## 2020-05-07 RX ADMIN — TAMSULOSIN HYDROCHLORIDE 0.4 MG: 0.4 CAPSULE ORAL at 10:05

## 2020-05-07 RX ADMIN — ACETAMINOPHEN 650 MG: 325 TABLET ORAL at 10:05

## 2020-05-07 RX ADMIN — Medication 400 MG: at 09:05

## 2020-05-07 RX ADMIN — HYDRALAZINE HYDROCHLORIDE 10 MG: 10 TABLET, FILM COATED ORAL at 10:05

## 2020-05-07 RX ADMIN — NIFEDIPINE 30 MG: 30 TABLET, FILM COATED, EXTENDED RELEASE ORAL at 09:05

## 2020-05-07 RX ADMIN — SODIUM CHLORIDE: 0.9 INJECTION, SOLUTION INTRAVENOUS at 12:05

## 2020-05-07 RX ADMIN — PANTOPRAZOLE SODIUM 40 MG: 40 TABLET, DELAYED RELEASE ORAL at 09:05

## 2020-05-07 RX ADMIN — CARVEDILOL 25 MG: 12.5 TABLET, FILM COATED ORAL at 10:05

## 2020-05-07 RX ADMIN — HYDRALAZINE HYDROCHLORIDE AND ISOSORBIDE DINITRATE 1 TABLET: 37.5; 2 TABLET, FILM COATED ORAL at 02:05

## 2020-05-07 RX ADMIN — SODIUM BICARBONATE TAB 650 MG 1300 MG: 650 TAB at 09:05

## 2020-05-07 RX ADMIN — SODIUM BICARBONATE TAB 650 MG 1300 MG: 650 TAB at 03:05

## 2020-05-07 RX ADMIN — SODIUM CHLORIDE: 0.9 INJECTION, SOLUTION INTRAVENOUS at 09:05

## 2020-05-07 RX ADMIN — HYDRALAZINE HYDROCHLORIDE 10 MG: 10 TABLET, FILM COATED ORAL at 05:05

## 2020-05-07 RX ADMIN — TAMSULOSIN HYDROCHLORIDE 0.4 MG: 0.4 CAPSULE ORAL at 09:05

## 2020-05-07 RX ADMIN — Medication 1 PATCH: at 09:05

## 2020-05-07 RX ADMIN — DEXTROSE: 5 SOLUTION INTRAVENOUS at 09:05

## 2020-05-07 RX ADMIN — BUSPIRONE HYDROCHLORIDE 5 MG: 5 TABLET ORAL at 09:05

## 2020-05-07 RX ADMIN — Medication 10 ML: at 11:05

## 2020-05-07 RX ADMIN — Medication 1 G: at 09:05

## 2020-05-07 RX ADMIN — ENOXAPARIN SODIUM 40 MG: 100 INJECTION SUBCUTANEOUS at 06:05

## 2020-05-07 RX ADMIN — HYDRALAZINE HYDROCHLORIDE 10 MG: 20 INJECTION INTRAMUSCULAR; INTRAVENOUS at 12:05

## 2020-05-07 RX ADMIN — HYDRALAZINE HYDROCHLORIDE 10 MG: 10 TABLET, FILM COATED ORAL at 02:05

## 2020-05-07 RX ADMIN — TOLVAPTAN 30 MG: 30 TABLET ORAL at 11:05

## 2020-05-07 RX ADMIN — SODIUM CHLORIDE: 0.9 INJECTION, SOLUTION INTRAVENOUS at 06:05

## 2020-05-07 RX ADMIN — BUSPIRONE HYDROCHLORIDE 5 MG: 5 TABLET ORAL at 03:05

## 2020-05-07 NOTE — HPI
59 year old male with PMH including depression, anxiety, HTN, and elevated cholesterol  Presented to ED on 5/4 with complaints of substernal chest tightness, malaise, dry mouth, lower extremity numbness  Evaluation revealed elevated BP that improved with IV labetalol; sodium level 121; CT head unremarkable; CXR without infiltrates or acute findings; and COVID19 negative  Admitted to inpt floor for management of hyponatremia  Early on morning of 5/7 he transferred to ICU for close monitoring and potential initiating 3% Saline infusion after failing conservative management.

## 2020-05-07 NOTE — PROCEDURES
"Juwan Pozo is a 59 y.o. male patient.    Temp: 98.7 °F (37.1 °C) (05/06/20 2300)  Pulse: 91 (05/07/20 0100)  Resp: 16 (05/07/20 0100)  BP: (!) 179/89 (05/07/20 0100)  SpO2: 96 % (05/07/20 0100)  Weight: 91.6 kg (201 lb 15.1 oz) (05/06/20 2300)  Height: 6' 1" (185.4 cm) (05/06/20 2345)    PICC  Date/Time: 5/7/2020 1:10 AM  Performed by: Maykel Fabian RN  Consent Done: Yes  Time out: Immediately prior to procedure a time out was called to verify the correct patient, procedure, equipment, support staff and site/side marked as required  Indications: med administration and vascular access  Anesthesia: local infiltration  Local anesthetic: lidocaine 1% without epinephrine  Anesthetic Total (mL): 2  Preparation: skin prepped with chlorhexidine (without alcohol)  Skin prep agent dried: skin prep agent completely dried prior to procedure  Sterile barriers: all five maximum sterile barriers used - cap, mask, sterile gown, sterile gloves, and large sterile sheet  Hand hygiene: hand hygiene performed prior to central venous catheter insertion  Location details: right basilic  Catheter type: double lumen  Catheter size: 5 Fr  Catheter Length: 41cm    Ultrasound guidance: yes  Vessel Caliber: medium, compressibility normal  Vascular Doppler: not done  Needle advanced into vessel with real time Ultrasound guidance.  Guidewire confirmed in vessel.  Sterile sheath used.  no esophageal manometryNumber of attempts: 1  Post-procedure: blood return through all ports and sterile dressing applied            Maykel Fabian  5/7/2020  "

## 2020-05-07 NOTE — PLAN OF CARE
Patient transferred from Tele to ICU due to low sodium levels with plans for a saline drip. Sodium level was 119 and per nephrology, it was decided not to start the drip and keep patient on continuous normal saline. PICC line was placed and wife updated via telephone. Patient is also hypertensive and lethargic, MD is aware and orders have been placed. Patient is alert, in bed with side rails up x2 and call light within reach.

## 2020-05-07 NOTE — SUBJECTIVE & OBJECTIVE
Past Medical History:   Diagnosis Date    Depression     High cholesterol     Hypertension        Past Surgical History:   Procedure Laterality Date    APPENDECTOMY      arm surgery      KNEE SURGERY         Review of patient's allergies indicates:   Allergen Reactions    Codeine     Penicillins        Family History     Problem Relation (Age of Onset)    Hypertension Mother        Tobacco Use    Smoking status: Current Every Day Smoker     Packs/day: 1.00     Types: Cigarettes   Substance and Sexual Activity    Alcohol use: Not Currently    Drug use: Not Currently    Sexual activity: Not on file         Review of Systems   Constitutional: Negative.  Negative for chills and fever.   HENT: Negative.  Negative for congestion and sore throat.    Eyes: Negative.  Negative for visual disturbance.   Respiratory: Negative.  Negative for cough, shortness of breath and wheezing.    Cardiovascular: Negative.  Negative for chest pain.   Gastrointestinal: Negative for abdominal pain, diarrhea, nausea and vomiting.   Endocrine: Negative.    Genitourinary: Negative.    Musculoskeletal: Positive for myalgias. Negative for neck stiffness.   Skin: Negative.  Negative for color change and pallor.   Allergic/Immunologic: Negative.    Neurological: Negative.    Hematological: Negative.    Psychiatric/Behavioral: Positive for agitation. The patient is nervous/anxious.    All other systems reviewed and are negative.    Objective:     Vital Signs (Most Recent):  Temp: 98.7 °F (37.1 °C) (05/06/20 2300)  Pulse: 91 (05/07/20 0200)  Resp: 15 (05/07/20 0200)  BP: (!) 182/95 (05/07/20 0200)  SpO2: 96 % (05/07/20 0200) Vital Signs (24h Range):  Temp:  [97.7 °F (36.5 °C)-99.2 °F (37.3 °C)] 98.7 °F (37.1 °C)  Pulse:  [] 91  Resp:  [14-19] 15  SpO2:  [94 %-97 %] 96 %  BP: (156-191)/() 182/95     Weight: 91.6 kg (201 lb 15.1 oz)  Body mass index is 26.64 kg/m².      Intake/Output Summary (Last 24 hours) at 5/7/2020 0208  Last  data filed at 5/7/2020 0200  Gross per 24 hour   Intake 851.67 ml   Output 2620 ml   Net -1768.33 ml       Physical Exam   Constitutional: He is oriented to person, place, and time. He appears well-developed and well-nourished. He appears distressed.   HENT:   Head: Normocephalic and atraumatic.   Mouth/Throat: Oropharynx is clear and moist.   Eyes: Pupils are equal, round, and reactive to light. Conjunctivae and EOM are normal.   Neck: No JVD present. No thyromegaly present.   Cardiovascular: Normal rate, regular rhythm and normal heart sounds. Exam reveals no gallop and no friction rub.   No murmur heard.  Pulmonary/Chest: Effort normal and breath sounds normal. No respiratory distress. He has no wheezes. He has no rales.   Abdominal: Soft. Bowel sounds are normal. He exhibits no distension. There is no tenderness. There is no rebound and no guarding.   Musculoskeletal: Normal range of motion. He exhibits no edema or tenderness.   RUE PICC line secure and functional.   Lymphadenopathy:     He has no cervical adenopathy.   Neurological: He is alert and oriented to person, place, and time. He has normal reflexes. He displays normal reflexes. No cranial nerve deficit.   Skin: Skin is warm and dry. No rash noted. He is not diaphoretic. No erythema.   Psychiatric: His behavior is normal. Judgment and thought content normal.   Anxious appearing.       Vents:       Lines/Drains/Airways     Peripherally Inserted Central Catheter Line            PICC Double Lumen 05/07/20 0110 right basilic less than 1 day    PICC Double Lumen 05/07/20 0115 right basilic less than 1 day          Drain                 Urethral Catheter 05/06/20 1900 Double-lumen less than 1 day          Peripheral Intravenous Line                 Peripheral IV - Single Lumen 05/06/20 1813 20 G Anterior;Right Forearm less than 1 day                Significant Labs:    CBC/Anemia Profile:  Recent Labs   Lab 05/05/20  0845 05/06/20  0856   WBC 14.37* 13.14*    HGB 14.7 14.6   HCT 43.0 42.2    252   MCV 87 87   RDW 11.8 11.6        Chemistries:  Recent Labs   Lab 05/05/20  0845  05/06/20  0823  05/06/20  1239 05/06/20  1809 05/06/20 2002 05/06/20  2203   *   < > 115*   < > 114* 115* 115* 115*   K 4.2   < > 4.3  --  4.4  --  4.1  --    CL 89*   < > 86*  --  85*  --  86*  --    CO2 17*   < > 19*  --  15*  --  18*  --    BUN 8   < > 11  --  13  --  11  --    CREATININE 0.8   < > 0.7  --  0.7  --  0.8  --    CALCIUM 9.0   < > 9.1  --  8.6*  --  9.0  --    MG 1.4*  --   --   --   --   --   --   --    PHOS 3.3  --   --   --   --   --   --   --     < > = values in this interval not displayed.       All pertinent labs within the past 24 hours have been reviewed.    Significant Imaging:   I have reviewed all pertinent imaging results/findings within the past 24 hours.

## 2020-05-07 NOTE — ASSESSMENT & PLAN NOTE
Difficult to control.  Nifedipine, Hydralazine, Verapamil, and Carvedilol.  No ACE/ARB/Diuretic with acute hyponatremia.

## 2020-05-07 NOTE — H&P
[facility] -   Critical Care Medicine  History & Physical    Patient Name: Juwan Pozo  MRN: [id number]  Admission Date: 5/4/2020  Hospital Length of Stay: 3 days  Code Status: No Order  Attending Physician: Geoff Daniel MD   Primary Care Provider: Primary Doctor No   Principal Problem: Hyponatremia    Subjective:     HPI:  Transferred to ICU for close monitoring while initiating 3% Saline infusion.  Admitted for moderate hyponatremia that has worsened under conservative management.  He continues to complain of vague overall not feeling well.  Hypertension has remained very difficult to control.    Hospital/ICU Course:  No notes on file     Past Medical History:   Diagnosis Date    Depression     High cholesterol     Hypertension        Past Surgical History:   Procedure Laterality Date    APPENDECTOMY      arm surgery      KNEE SURGERY         Review of patient's allergies indicates:   Allergen Reactions    Codeine     Penicillins        Family History     Problem Relation (Age of Onset)    Hypertension Mother        Tobacco Use    Smoking status: Current Every Day Smoker     Packs/day: 1.00     Types: Cigarettes   Substance and Sexual Activity    Alcohol use: Not Currently    Drug use: Not Currently    Sexual activity: Not on file         Review of Systems   Constitutional: Negative.  Negative for chills and fever.   HENT: Negative.  Negative for congestion and sore throat.    Eyes: Negative.  Negative for visual disturbance.   Respiratory: Negative.  Negative for cough, shortness of breath and wheezing.    Cardiovascular: Negative.  Negative for chest pain.   Gastrointestinal: Negative for abdominal pain, diarrhea, nausea and vomiting.   Endocrine: Negative.    Genitourinary: Negative.    Musculoskeletal: Positive for myalgias. Negative for neck stiffness.   Skin: Negative.  Negative for color change and pallor.   Allergic/Immunologic: Negative.    Neurological: Negative.     Hematological: Negative.    Psychiatric/Behavioral: Positive for agitation. The patient is nervous/anxious.    All other systems reviewed and are negative.    Objective:     Vital Signs (Most Recent):  Temp: 98.7 °F (37.1 °C) (05/06/20 2300)  Pulse: 91 (05/07/20 0200)  Resp: 15 (05/07/20 0200)  BP: (!) 182/95 (05/07/20 0200)  SpO2: 96 % (05/07/20 0200) Vital Signs (24h Range):  Temp:  [97.7 °F (36.5 °C)-99.2 °F (37.3 °C)] 98.7 °F (37.1 °C)  Pulse:  [] 91  Resp:  [14-19] 15  SpO2:  [94 %-97 %] 96 %  BP: (156-191)/() 182/95     Weight: 91.6 kg (201 lb 15.1 oz)  Body mass index is 26.64 kg/m².      Intake/Output Summary (Last 24 hours) at 5/7/2020 0208  Last data filed at 5/7/2020 0200  Gross per 24 hour   Intake 851.67 ml   Output 2620 ml   Net -1768.33 ml       Physical Exam   Constitutional: He is oriented to person, place, and time. He appears well-developed and well-nourished. He appears distressed.   HENT:   Head: Normocephalic and atraumatic.   Mouth/Throat: Oropharynx is clear and moist.   Eyes: Pupils are equal, round, and reactive to light. Conjunctivae and EOM are normal.   Neck: No JVD present. No thyromegaly present.   Cardiovascular: Normal rate, regular rhythm and normal heart sounds. Exam reveals no gallop and no friction rub.   No murmur heard.  Pulmonary/Chest: Effort normal and breath sounds normal. No respiratory distress. He has no wheezes. He has no rales.   Abdominal: Soft. Bowel sounds are normal. He exhibits no distension. There is no tenderness. There is no rebound and no guarding.   Musculoskeletal: Normal range of motion. He exhibits no edema or tenderness.   RUE PICC line secure and functional.   Lymphadenopathy:     He has no cervical adenopathy.   Neurological: He is alert and oriented to person, place, and time. He has normal reflexes. He displays normal reflexes. No cranial nerve deficit.   Skin: Skin is warm and dry. No rash noted. He is not diaphoretic. No erythema.    Psychiatric: His behavior is normal. Judgment and thought content normal.   Anxious appearing.       Vents:       Lines/Drains/Airways     Peripherally Inserted Central Catheter Line            PICC Double Lumen 05/07/20 0110 right basilic less than 1 day    PICC Double Lumen 05/07/20 0115 right basilic less than 1 day          Drain                 Urethral Catheter 05/06/20 1900 Double-lumen less than 1 day          Peripheral Intravenous Line                 Peripheral IV - Single Lumen 05/06/20 1813 20 G Anterior;Right Forearm less than 1 day                Significant Labs:    CBC/Anemia Profile:  Recent Labs   Lab 05/05/20  0845 05/06/20  0856   WBC 14.37* 13.14*   HGB 14.7 14.6   HCT 43.0 42.2    252   MCV 87 87   RDW 11.8 11.6        Chemistries:  Recent Labs   Lab 05/05/20  0845  05/06/20  0823  05/06/20  1239 05/06/20  1809 05/06/20 2002 05/06/20  2203   *   < > 115*   < > 114* 115* 115* 115*   K 4.2   < > 4.3  --  4.4  --  4.1  --    CL 89*   < > 86*  --  85*  --  86*  --    CO2 17*   < > 19*  --  15*  --  18*  --    BUN 8   < > 11  --  13  --  11  --    CREATININE 0.8   < > 0.7  --  0.7  --  0.8  --    CALCIUM 9.0   < > 9.1  --  8.6*  --  9.0  --    MG 1.4*  --   --   --   --   --   --   --    PHOS 3.3  --   --   --   --   --   --   --     < > = values in this interval not displayed.       All pertinent labs within the past 24 hours have been reviewed.    Significant Imaging:   I have reviewed all pertinent imaging results/findings within the past 24 hours.    Assessment/Plan:     Psychiatric  Anxiety disorder  Alprazolam as needed.  Holding SSRI.    Cardiac/Vascular  Hypertension, essential  Difficult to control.  Nifedipine, Hydralazine, Verapamil, and Carvedilol.  No ACE/ARB/Diuretic with acute hyponatremia.    Renal/  * Hyponatremia  Presumably due to central effect of Fluoxetine which has been stopped.  Initial attempts with fluid restriction and IV Normal Saline have not been  effective.  Placed PICC line for possible 3% Saline infusion.  Received Tolvaptan 30 mg evening 06 May.  Continue to monitor urine output and serial chemistries.  Nephrology assisting with management.  Sodium Bicarbonate and Sodium Chloride tablets.    Hypomagnesemia  Monitoring levels and oral replacement.    Oncology  Leukocytosis  Uncertain etiology but no signs of infection.  Continue to monitor.    Other  Tobacco use  Counseled on cessation.        Critical Care Daily Checklist:    A: Awake: RASS Goal/Actual Goal:    Actual:     B: Spontaneous Breathing Trial Performed?     C: SAT & SBT Coordinated?  N/A                      D: Delirium: CAM-ICU Overall CAM-ICU: Negative   E: Early Mobility Performed? Yes   F: Feeding Goal:    Status:     Current Diet Order   Procedures    Diet Adult Regular (IDDSI Level 7)      AS: Analgesia/Sedation N/A   T: Thromboembolic Prophylaxis Enoxaparin   H: HOB > 300 Yes   U: Stress Ulcer Prophylaxis (if needed) Pantoprazole   G: Glucose Control N/A   B: Bowel Function Stool Occurrence: 0   I: Indwelling Catheter (Lines & Ordaz) Necessity PICC and Ordaz   D: De-escalation of Antimicrobials/Pharmacotherapies N/A    Plan for the day/ETD Yes    Code Status:  Family/Goals of Care: No Order  Yes     Critical Care Time: 45 minutes  Critical secondary to Patient has a condition that poses threat to life and bodily function: Severe Hyponatremia     Critical care was time spent personally by me on the following activities: development of treatment plan with patient or surrogate and bedside caregivers, discussions with consultants, evaluation of patient's response to treatment, examination of patient, ordering and performing treatments and interventions, ordering and review of laboratory studies, ordering and review of radiographic studies, pulse oximetry, re-evaluation of patient's condition. This critical care time did not overlap with that of any other provider or involve time for any  procedures.     Collins Draper MD  Critical Care Medicine  [facility] -

## 2020-05-07 NOTE — SUBJECTIVE & OBJECTIVE
Interval History:  No acute events,     Review of patient's allergies indicates:   Allergen Reactions    Codeine     Penicillins      Current Facility-Administered Medications   Medication Frequency    0.9%  NaCl infusion Continuous    acetaminophen tablet 650 mg Q6H PRN    albuterol-ipratropium 2.5 mg-0.5 mg/3 mL nebulizer solution 3 mL Q4H PRN    ALPRAZolam tablet 0.5 mg Nightly PRN    aluminum-magnesium hydroxide-simethicone 200-200-20 mg/5 mL suspension 30 mL Q6H PRN    aspirin chewable tablet 81 mg Daily    busPIRone tablet 5 mg TID    carvediloL tablet 25 mg BID    cyclobenzaprine tablet 10 mg TID PRN    diphenhydrAMINE capsule 25 mg Q6H PRN    enoxaparin injection 40 mg Daily    guaifenesin 100 mg/5 ml syrup 200 mg Q4H PRN    hydrALAZINE injection 20 mg Q6H PRN    hydrALAZINE tablet 10 mg Q8H    influenza (QUADRIVALENT PF) vaccine 0.5 mL vaccine x 1 dose    magnesium oxide tablet 400 mg BID    nicotine 21 mg/24 hr 1 patch Daily    NIFEdipine 24 hr tablet 30 mg Daily    ondansetron injection 4 mg Q8H PRN    pantoprazole EC tablet 40 mg Daily    simvastatin tablet 40 mg QHS    sodium bicarbonate tablet 1,300 mg TID    sodium chloride 0.9% flush 10 mL Q6H    And    sodium chloride 0.9% flush 10 mL PRN    sodium chloride tablet 1 g BID    tamsulosin 24 hr capsule 0.4 mg BID    verapamiL CR tablet 180 mg Daily       Objective:     Vital Signs (Most Recent):  Temp: 98.3 °F (36.8 °C) (05/07/20 1105)  Pulse: 93 (05/07/20 1130)  Resp: 15 (05/07/20 1130)  BP: 136/68 (05/07/20 1130)  SpO2: 95 % (05/07/20 1130)  O2 Device (Oxygen Therapy): room air (05/07/20 0300) Vital Signs (24h Range):  Temp:  [97.7 °F (36.5 °C)-98.7 °F (37.1 °C)] 98.3 °F (36.8 °C)  Pulse:  [] 93  Resp:  [10-19] 15  SpO2:  [92 %-97 %] 95 %  BP: (125-191)/() 136/68     Weight: 91.6 kg (201 lb 15.1 oz) (05/06/20 2300)  Body mass index is 26.64 kg/m².  Body surface area is 2.17 meters squared.    I/O last 3  completed shifts:  In: 1251.7 [P.O.:680; I.V.:571.7]  Out: 2845 [Urine:2845]    Physical Exam   Constitutional: He appears well-developed. No distress.   Sleepy, confused    HENT:   Head: Normocephalic and atraumatic.   Eyes: Pupils are equal, round, and reactive to light.   Neck: Normal range of motion. Neck supple. No tracheal deviation present. No thyromegaly present.   Cardiovascular: Normal rate, regular rhythm, normal heart sounds and intact distal pulses. Exam reveals no gallop and no friction rub.   No murmur heard.  Pulmonary/Chest: Breath sounds normal. He has no wheezes. He has no rales.   Abdominal: Soft. He exhibits no mass. There is no tenderness. There is no rebound and no guarding.   Musculoskeletal: Normal range of motion. He exhibits no edema.   Lymphadenopathy:     He has no cervical adenopathy.   Skin: Skin is warm. No rash noted. He is not diaphoretic. No erythema.   Nursing note and vitals reviewed.      Significant Labs:  CBC:   Recent Labs   Lab 05/06/20  0856   WBC 13.14*   RBC 4.88   HGB 14.6   HCT 42.2      MCV 87   MCH 29.9   MCHC 34.6     CMP:   Recent Labs   Lab 05/04/20  1500  05/07/20  0529 05/07/20  0936   *   < > 116*  --    CALCIUM 9.3   < > 8.7  --    ALBUMIN 4.3  --   --   --    PROT 7.5  --   --   --    *   < > 118* 118*   K 4.4   < > 4.0  --    CO2 20*   < > 22*  --    CL 88*   < > 86*  --    BUN 11   < > 13  --    CREATININE 1.0   < > 0.7  --    ALKPHOS 95  --   --   --    ALT 27  --   --   --    AST 21  --   --   --    BILITOT 1.1*  --   --   --     < > = values in this interval not displayed.     Coagulation: No results for input(s): PT, INR, APTT in the last 168 hours.  LFTs:   Recent Labs   Lab 05/04/20  1500   ALT 27   AST 21   ALKPHOS 95   BILITOT 1.1*   PROT 7.5   ALBUMIN 4.3     All labs within the past 24 hours have been reviewed.     Significant Imaging:  Reviewed    Lab Results   Component Value Date    CALCIUM 8.7 05/07/2020    PHOS 3.3  05/05/2020     Lab Results   Component Value Date    URICACID 4.2 05/07/2020

## 2020-05-07 NOTE — EICU
Brief new admit note:  transferred from floor to ICU for hyponatremia. Weakness.        59-year-old  man with history of hypertension, anxiety, PTSD, Major depression, on medications including Prozac, patient was admitted to the hospital yesterday for generalized weakness, his serum sodium on presentation was 121, initially thought that he might be dehydrated hydration was started on IV fluids, his serum sodium decreased to 117, seen by Nephrologist  In noon.     Dx SIADH. Fluid restriction, dose of tolvaptan, bicarb supplements.     Data:  Sodium stable at 115 since noon, 3 rd one. Cr 0.8, co2 at 18.  Hypochloremia also. Urine sodium 69. Urine osmolality : 560> serum osmols.   Covid neg on 4 th. UA neg.   CTH and cxr negative .     Camera:  Resting, on room air. sats 97%, HR 85, 181/92.     Plan:  - continue anti hypertensive  - getting  ml/hr started at mid night. Follow Sodium levels.  Aspiration and seizure precautions  - on nicotine patch  - VTE: consider Lovenox 40 sq daily.  - getting PICC line.    discussed with bed side RN. Keep close eye on sodium follow ups.  No intervention needed from us    Ordered TSH level for AM also.

## 2020-05-07 NOTE — PLAN OF CARE
Dx low sodium  Poc neuro check q4h. Sodium q6h. Ordaz cath to measure urine. Fall risk and precautions. Bed alarm on repositioned q2h. Encouraged dbc.

## 2020-05-07 NOTE — NURSING TRANSFER
Nursing Transfer Note      5/6/2020     Transfer to ICU 7 from room 210    Transfer via pt bed    Transfer with tele monitor    Transported by LULY Harrison RN and AMADEO Salcedo RN    Medicines sent: personal belongings    Chart send with patient: Yes    Notified: JOSE CRUZ Yeboah

## 2020-05-07 NOTE — ASSESSMENT & PLAN NOTE
1.  Hyponatremia,   SIADH, likely from his medications, was on Fluxetine prior to admission,  elevated urine osmolality , patient received a dose of tolvaptan, since he is not taking p.o. well was started on IV fluids, will monitor serum sodium levels closely, baseline sodium about 125-130,  will have to rule out underlying malignancy with a CT head and chest,     2.  Hypertension, BP better ,      3.  Stable renal fn, serum cr 0.8 mg/dl  ,     4.  Metabolic acidosis, cont  sodium bicarbonate supplements,

## 2020-05-07 NOTE — NURSING
Patient is having recurrent episodes of bradycardia with associated desaturation. Each episode is lasting a few seconds then resolving. Notified MD.

## 2020-05-07 NOTE — PROGRESS NOTES
[facility]  Nephrology  Progress Note    Patient Name: Juwan Pozo  MRN: [id number]  Admission Date: 5/4/2020  Hospital Length of Stay: 3 days  Attending Provider: Timoteo Guzman MD   Primary Care Physician: Primary Doctor No  Principal Problem:Hyponatremia    Subjective:     HPI: Juwan Pozo is a 59-year-old  man with history of hypertension, anxiety, PTSD, Major depression, on medications including Prozac, patient was admitted to the hospital yesterday for generalized weakness, his serum sodium on presentation was 121, initially thought that he might be dehydrated hydration was started on IV fluids, his serum sodium decreased to 117, we were consulted  because of worsening hyponatremia, renal function is stable with serum creatinine 0.8 mg/dL,    Interval History:  No acute events,     Review of patient's allergies indicates:   Allergen Reactions    Codeine     Penicillins      Current Facility-Administered Medications   Medication Frequency    0.9%  NaCl infusion Continuous    acetaminophen tablet 650 mg Q6H PRN    albuterol-ipratropium 2.5 mg-0.5 mg/3 mL nebulizer solution 3 mL Q4H PRN    ALPRAZolam tablet 0.5 mg Nightly PRN    aluminum-magnesium hydroxide-simethicone 200-200-20 mg/5 mL suspension 30 mL Q6H PRN    aspirin chewable tablet 81 mg Daily    busPIRone tablet 5 mg TID    carvediloL tablet 25 mg BID    cyclobenzaprine tablet 10 mg TID PRN    diphenhydrAMINE capsule 25 mg Q6H PRN    enoxaparin injection 40 mg Daily    guaifenesin 100 mg/5 ml syrup 200 mg Q4H PRN    hydrALAZINE injection 20 mg Q6H PRN    hydrALAZINE tablet 10 mg Q8H    influenza (QUADRIVALENT PF) vaccine 0.5 mL vaccine x 1 dose    magnesium oxide tablet 400 mg BID    nicotine 21 mg/24 hr 1 patch Daily    NIFEdipine 24 hr tablet 30 mg Daily    ondansetron injection 4 mg Q8H PRN    pantoprazole EC tablet 40 mg Daily    simvastatin tablet 40 mg QHS    sodium bicarbonate tablet 1,300 mg  TID    sodium chloride 0.9% flush 10 mL Q6H    And    sodium chloride 0.9% flush 10 mL PRN    sodium chloride tablet 1 g BID    tamsulosin 24 hr capsule 0.4 mg BID    verapamiL CR tablet 180 mg Daily       Objective:     Vital Signs (Most Recent):  Temp: 98.3 °F (36.8 °C) (05/07/20 1105)  Pulse: 93 (05/07/20 1130)  Resp: 15 (05/07/20 1130)  BP: 136/68 (05/07/20 1130)  SpO2: 95 % (05/07/20 1130)  O2 Device (Oxygen Therapy): room air (05/07/20 0300) Vital Signs (24h Range):  Temp:  [97.7 °F (36.5 °C)-98.7 °F (37.1 °C)] 98.3 °F (36.8 °C)  Pulse:  [] 93  Resp:  [10-19] 15  SpO2:  [92 %-97 %] 95 %  BP: (125-191)/() 136/68     Weight: 91.6 kg (201 lb 15.1 oz) (05/06/20 2300)  Body mass index is 26.64 kg/m².  Body surface area is 2.17 meters squared.    I/O last 3 completed shifts:  In: 1251.7 [P.O.:680; I.V.:571.7]  Out: 2845 [Urine:2845]    Physical Exam   Constitutional: He appears well-developed. No distress.   Sleepy, confused    HENT:   Head: Normocephalic and atraumatic.   Eyes: Pupils are equal, round, and reactive to light.   Neck: Normal range of motion. Neck supple. No tracheal deviation present. No thyromegaly present.   Cardiovascular: Normal rate, regular rhythm, normal heart sounds and intact distal pulses. Exam reveals no gallop and no friction rub.   No murmur heard.  Pulmonary/Chest: Breath sounds normal. He has no wheezes. He has no rales.   Abdominal: Soft. He exhibits no mass. There is no tenderness. There is no rebound and no guarding.   Musculoskeletal: Normal range of motion. He exhibits no edema.   Lymphadenopathy:     He has no cervical adenopathy.   Skin: Skin is warm. No rash noted. He is not diaphoretic. No erythema.   Nursing note and vitals reviewed.      Significant Labs:  CBC:   Recent Labs   Lab 05/06/20  0856   WBC 13.14*   RBC 4.88   HGB 14.6   HCT 42.2      MCV 87   MCH 29.9   MCHC 34.6     CMP:   Recent Labs   Lab 05/04/20  1500  05/07/20  0529 05/07/20  0936    *   < > 116*  --    CALCIUM 9.3   < > 8.7  --    ALBUMIN 4.3  --   --   --    PROT 7.5  --   --   --    *   < > 118* 118*   K 4.4   < > 4.0  --    CO2 20*   < > 22*  --    CL 88*   < > 86*  --    BUN 11   < > 13  --    CREATININE 1.0   < > 0.7  --    ALKPHOS 95  --   --   --    ALT 27  --   --   --    AST 21  --   --   --    BILITOT 1.1*  --   --   --     < > = values in this interval not displayed.     Coagulation: No results for input(s): PT, INR, APTT in the last 168 hours.  LFTs:   Recent Labs   Lab 05/04/20  1500   ALT 27   AST 21   ALKPHOS 95   BILITOT 1.1*   PROT 7.5   ALBUMIN 4.3     All labs within the past 24 hours have been reviewed.     Significant Imaging:  Reviewed    Lab Results   Component Value Date    CALCIUM 8.7 05/07/2020    PHOS 3.3 05/05/2020     Lab Results   Component Value Date    URICACID 4.2 05/07/2020         Assessment/Plan:     * Hyponatremia  1.  Hyponatremia,   SIADH, likely from his medications, was on Fluxetine prior to admission,  elevated urine osmolality , patient received a dose of tolvaptan, since he is not taking p.o. well was started on IV fluids, will monitor serum sodium levels closely, baseline sodium about 125-130,  will have to rule out underlying malignancy with a CT head and chest,     2.  Hypertension, BP better ,      3.  Stable renal fn, serum cr 0.8 mg/dl  ,     4.  Metabolic acidosis, cont  sodium bicarbonate supplements,           Thank you for your consult. I will follow-up with patient. Please contact us if you have any additional questions.     Total critical care time spent 40 minutes including time needed to review the records,  patient  evaluation, documentation, face-to-face discussion with the patient, primary team,  more than 50% of the time was spent on coordination of care and counseling.       Rey Benitez MD  Nephrology  [facility]

## 2020-05-07 NOTE — ASSESSMENT & PLAN NOTE
Presumably due to central effect of Fluoxetine which has been stopped.  Initial attempts with fluid restriction and IV Normal Saline have not been effective.  Placed PICC line for possible 3% Saline infusion.  Received Tolvaptan 30 mg evening 06 May.  Continue to monitor urine output and serial chemistries.  Nephrology assisting with management.  Sodium Bicarbonate and Sodium Chloride tablets.

## 2020-05-08 LAB
25(OH)D3+25(OH)D2 SERPL-MCNC: 37 NG/ML (ref 30–96)
ACTH PLAS-MCNC: 7 PG/ML (ref 0–46)
ANION GAP SERPL CALC-SCNC: 10 MMOL/L (ref 8–16)
ANION GAP SERPL CALC-SCNC: 11 MMOL/L (ref 8–16)
ANION GAP SERPL CALC-SCNC: 12 MMOL/L (ref 8–16)
ANION GAP SERPL CALC-SCNC: 8 MMOL/L (ref 8–16)
ANION GAP SERPL CALC-SCNC: 8 MMOL/L (ref 8–16)
ANION GAP SERPL CALC-SCNC: 9 MMOL/L (ref 8–16)
BASOPHILS # BLD AUTO: 0.06 K/UL (ref 0–0.2)
BASOPHILS NFR BLD: 0.4 % (ref 0–1.9)
BUN SERPL-MCNC: 12 MG/DL (ref 6–20)
BUN SERPL-MCNC: 12 MG/DL (ref 6–20)
BUN SERPL-MCNC: 13 MG/DL (ref 6–20)
BUN SERPL-MCNC: 14 MG/DL (ref 6–20)
CALCIUM SERPL-MCNC: 8.3 MG/DL (ref 8.7–10.5)
CALCIUM SERPL-MCNC: 8.4 MG/DL (ref 8.7–10.5)
CALCIUM SERPL-MCNC: 8.6 MG/DL (ref 8.7–10.5)
CALCIUM SERPL-MCNC: 8.7 MG/DL (ref 8.7–10.5)
CALCIUM SERPL-MCNC: 8.8 MG/DL (ref 8.7–10.5)
CALCIUM SERPL-MCNC: 8.8 MG/DL (ref 8.7–10.5)
CHLORIDE SERPL-SCNC: 94 MMOL/L (ref 95–110)
CHLORIDE SERPL-SCNC: 95 MMOL/L (ref 95–110)
CO2 SERPL-SCNC: 22 MMOL/L (ref 23–29)
CO2 SERPL-SCNC: 24 MMOL/L (ref 23–29)
CREAT SERPL-MCNC: 0.8 MG/DL (ref 0.5–1.4)
CREAT SERPL-MCNC: 0.9 MG/DL (ref 0.5–1.4)
DIFFERENTIAL METHOD: ABNORMAL
EOSINOPHIL # BLD AUTO: 0 K/UL (ref 0–0.5)
EOSINOPHIL NFR BLD: 0.2 % (ref 0–8)
ERYTHROCYTE [DISTWIDTH] IN BLOOD BY AUTOMATED COUNT: 12 % (ref 11.5–14.5)
EST. GFR  (AFRICAN AMERICAN): >60 ML/MIN/1.73 M^2
EST. GFR  (NON AFRICAN AMERICAN): >60 ML/MIN/1.73 M^2
GLUCOSE SERPL-MCNC: 111 MG/DL (ref 70–110)
GLUCOSE SERPL-MCNC: 111 MG/DL (ref 70–110)
GLUCOSE SERPL-MCNC: 116 MG/DL (ref 70–110)
GLUCOSE SERPL-MCNC: 121 MG/DL (ref 70–110)
GLUCOSE SERPL-MCNC: 128 MG/DL (ref 70–110)
GLUCOSE SERPL-MCNC: 133 MG/DL (ref 70–110)
HCT VFR BLD AUTO: 39.5 % (ref 40–54)
HGB BLD-MCNC: 13.1 G/DL (ref 14–18)
IMM GRANULOCYTES # BLD AUTO: 0.08 K/UL (ref 0–0.04)
IMM GRANULOCYTES NFR BLD AUTO: 0.5 % (ref 0–0.5)
LYMPHOCYTES # BLD AUTO: 1.4 K/UL (ref 1–4.8)
LYMPHOCYTES NFR BLD: 8.8 % (ref 18–48)
MAGNESIUM SERPL-MCNC: 2.1 MG/DL (ref 1.6–2.6)
MCH RBC QN AUTO: 29.7 PG (ref 27–31)
MCHC RBC AUTO-ENTMCNC: 33.2 G/DL (ref 32–36)
MCV RBC AUTO: 90 FL (ref 82–98)
MONOCYTES # BLD AUTO: 1.7 K/UL (ref 0.3–1)
MONOCYTES NFR BLD: 10.9 % (ref 4–15)
NEUTROPHILS # BLD AUTO: 12.5 K/UL (ref 1.8–7.7)
NEUTROPHILS NFR BLD: 79.2 % (ref 38–73)
NRBC BLD-RTO: 0 /100 WBC
PLATELET # BLD AUTO: 289 K/UL (ref 150–350)
PMV BLD AUTO: 9.3 FL (ref 9.2–12.9)
POTASSIUM SERPL-SCNC: 3.6 MMOL/L (ref 3.5–5.1)
POTASSIUM SERPL-SCNC: 3.7 MMOL/L (ref 3.5–5.1)
POTASSIUM SERPL-SCNC: 3.8 MMOL/L (ref 3.5–5.1)
POTASSIUM SERPL-SCNC: 3.9 MMOL/L (ref 3.5–5.1)
POTASSIUM SERPL-SCNC: 3.9 MMOL/L (ref 3.5–5.1)
POTASSIUM SERPL-SCNC: 4.1 MMOL/L (ref 3.5–5.1)
RBC # BLD AUTO: 4.41 M/UL (ref 4.6–6.2)
SODIUM SERPL-SCNC: 126 MMOL/L (ref 136–145)
SODIUM SERPL-SCNC: 127 MMOL/L (ref 136–145)
SODIUM SERPL-SCNC: 128 MMOL/L (ref 136–145)
WBC # BLD AUTO: 15.73 K/UL (ref 3.9–12.7)

## 2020-05-08 PROCEDURE — S4991 NICOTINE PATCH NONLEGEND: HCPCS | Performed by: INTERNAL MEDICINE

## 2020-05-08 PROCEDURE — 25000003 PHARM REV CODE 250: Performed by: EMERGENCY MEDICINE

## 2020-05-08 PROCEDURE — 80048 BASIC METABOLIC PNL TOTAL CA: CPT

## 2020-05-08 PROCEDURE — 25000003 PHARM REV CODE 250: Performed by: NURSE PRACTITIONER

## 2020-05-08 PROCEDURE — 80048 BASIC METABOLIC PNL TOTAL CA: CPT | Mod: 91

## 2020-05-08 PROCEDURE — 25000003 PHARM REV CODE 250: Performed by: FAMILY MEDICINE

## 2020-05-08 PROCEDURE — 99291 PR CRITICAL CARE, E/M 30-74 MINUTES: ICD-10-PCS | Mod: ,,, | Performed by: NURSE PRACTITIONER

## 2020-05-08 PROCEDURE — 99291 CRITICAL CARE FIRST HOUR: CPT | Mod: ,,, | Performed by: NURSE PRACTITIONER

## 2020-05-08 PROCEDURE — 63600175 PHARM REV CODE 636 W HCPCS: Performed by: INTERNAL MEDICINE

## 2020-05-08 PROCEDURE — 25000003 PHARM REV CODE 250: Performed by: INTERNAL MEDICINE

## 2020-05-08 PROCEDURE — A4216 STERILE WATER/SALINE, 10 ML: HCPCS | Performed by: INTERNAL MEDICINE

## 2020-05-08 PROCEDURE — 99291 PR CRITICAL CARE, E/M 30-74 MINUTES: ICD-10-PCS | Mod: ,,, | Performed by: INTERNAL MEDICINE

## 2020-05-08 PROCEDURE — 83735 ASSAY OF MAGNESIUM: CPT

## 2020-05-08 PROCEDURE — 82306 VITAMIN D 25 HYDROXY: CPT

## 2020-05-08 PROCEDURE — 99291 CRITICAL CARE FIRST HOUR: CPT | Mod: ,,, | Performed by: INTERNAL MEDICINE

## 2020-05-08 PROCEDURE — 20000000 HC ICU ROOM

## 2020-05-08 PROCEDURE — 85025 COMPLETE CBC W/AUTO DIFF WBC: CPT

## 2020-05-08 RX ORDER — ALPRAZOLAM 0.25 MG/1
0.25 TABLET ORAL ONCE
Status: COMPLETED | OUTPATIENT
Start: 2020-05-08 | End: 2020-05-08

## 2020-05-08 RX ORDER — LABETALOL HYDROCHLORIDE 5 MG/ML
10 INJECTION, SOLUTION INTRAVENOUS EVERY 6 HOURS PRN
Status: DISCONTINUED | OUTPATIENT
Start: 2020-05-08 | End: 2020-05-13 | Stop reason: HOSPADM

## 2020-05-08 RX ORDER — POTASSIUM CHLORIDE 20 MEQ/1
40 TABLET, EXTENDED RELEASE ORAL ONCE
Status: COMPLETED | OUTPATIENT
Start: 2020-05-08 | End: 2020-05-08

## 2020-05-08 RX ORDER — ISOSORBIDE DINITRATE AND HYDRALAZINE HYDROCHLORIDE 37.5; 2 MG/1; MG/1
1 TABLET ORAL ONCE
Status: COMPLETED | OUTPATIENT
Start: 2020-05-08 | End: 2020-05-08

## 2020-05-08 RX ADMIN — Medication 1 PATCH: at 09:05

## 2020-05-08 RX ADMIN — HYDRALAZINE HYDROCHLORIDE 10 MG: 10 TABLET, FILM COATED ORAL at 01:05

## 2020-05-08 RX ADMIN — ASPIRIN 81 MG 81 MG: 81 TABLET ORAL at 08:05

## 2020-05-08 RX ADMIN — TAMSULOSIN HYDROCHLORIDE 0.4 MG: 0.4 CAPSULE ORAL at 08:05

## 2020-05-08 RX ADMIN — NIFEDIPINE 30 MG: 30 TABLET, FILM COATED, EXTENDED RELEASE ORAL at 08:05

## 2020-05-08 RX ADMIN — CARVEDILOL 25 MG: 12.5 TABLET, FILM COATED ORAL at 08:05

## 2020-05-08 RX ADMIN — BUSPIRONE HYDROCHLORIDE 5 MG: 5 TABLET ORAL at 03:05

## 2020-05-08 RX ADMIN — PANTOPRAZOLE SODIUM 40 MG: 40 TABLET, DELAYED RELEASE ORAL at 08:05

## 2020-05-08 RX ADMIN — VERAPAMIL HYDROCHLORIDE 180 MG: 180 TABLET, FILM COATED, EXTENDED RELEASE ORAL at 08:05

## 2020-05-08 RX ADMIN — HYDRALAZINE HYDROCHLORIDE 20 MG: 20 INJECTION INTRAMUSCULAR; INTRAVENOUS at 07:05

## 2020-05-08 RX ADMIN — Medication 400 MG: at 08:05

## 2020-05-08 RX ADMIN — POTASSIUM CHLORIDE 40 MEQ: 1500 TABLET, EXTENDED RELEASE ORAL at 08:05

## 2020-05-08 RX ADMIN — DIPHENHYDRAMINE HYDROCHLORIDE 25 MG: 25 CAPSULE ORAL at 02:05

## 2020-05-08 RX ADMIN — HYDRALAZINE HYDROCHLORIDE 20 MG: 20 INJECTION INTRAMUSCULAR; INTRAVENOUS at 03:05

## 2020-05-08 RX ADMIN — ENOXAPARIN SODIUM 40 MG: 100 INJECTION SUBCUTANEOUS at 04:05

## 2020-05-08 RX ADMIN — ALPRAZOLAM 0.5 MG: 0.5 TABLET ORAL at 02:05

## 2020-05-08 RX ADMIN — BUSPIRONE HYDROCHLORIDE 5 MG: 5 TABLET ORAL at 08:05

## 2020-05-08 RX ADMIN — Medication 10 ML: at 12:05

## 2020-05-08 RX ADMIN — HYDRALAZINE HYDROCHLORIDE AND ISOSORBIDE DINITRATE 1 TABLET: 37.5; 2 TABLET, FILM COATED ORAL at 06:05

## 2020-05-08 RX ADMIN — ALPRAZOLAM 0.25 MG: 0.25 TABLET ORAL at 06:05

## 2020-05-08 RX ADMIN — LABETALOL HYDROCHLORIDE 10 MG: 5 INJECTION, SOLUTION INTRAVENOUS at 10:05

## 2020-05-08 NOTE — PROGRESS NOTES
[facility]   Critical Care Medicine  Progress Note    Patient Name: Juwan Pozo  MRN: [id number]  Admission Date: 5/4/2020  Hospital Length of Stay: 4 days  Code Status: No Order  Attending Provider: Timoteo Guzman MD  Primary Care Provider: Primary Doctor No   Principal Problem: Hyponatremia    Subjective:     HPI:  59 year old male with PMH including depression, anxiety, HTN, and elevated cholesterol  Presented to ED on 5/4 with complaints of substernal chest tightness, malaise, dry mouth, lower extremity numbness  Evaluation revealed elevated BP that improved with IV labetalol; sodium level 121; CT head unremarkable; CXR without infiltrates or acute findings; and COVID19 negative  Admitted to inpt floor for management of hyponatremia  Early on morning of 5/7 he transferred to ICU for close monitoring and potential initiating 3% Saline infusion after failing conservative management.     Hospital/ICU Course:  5/7 - transferred to ICU overnight; awake and alert but minimal interaction with caregivers, no verbalization; given 2nd dose talvaptan per nephrology and noted dramatic increase in urine output  5/8 - More awake and interactive today, OOB to chair with nursing assist; Na trend up overnight, peaked at 128 before initiation of D5 IVF by nephrology; this am Na trend down at 126    Review of Systems   Constitutional: Positive for malaise/fatigue.   HENT: Negative for congestion and sinus pain.    Respiratory: Negative for shortness of breath.    Cardiovascular: Negative for chest pain.   Gastrointestinal: Negative for abdominal pain, nausea and vomiting.   Musculoskeletal: Positive for joint pain and myalgias.   Neurological: Negative for tingling, tremors and focal weakness.   Psychiatric/Behavioral: The patient does not have insomnia.          Objective:     Vital Signs (Most Recent):  Temp: 98.8 °F (37.1 °C) (05/08/20 0700)  Pulse: 90 (05/08/20 1000)  Resp: 17 (05/08/20 1000)  BP: (!)  166/78 (05/08/20 1000)  SpO2: 95 % (05/08/20 1000) Vital Signs (24h Range):  Temp:  [98.1 °F (36.7 °C)-99.2 °F (37.3 °C)] 98.8 °F (37.1 °C)  Pulse:  [] 90  Resp:  [11-26] 17  SpO2:  [91 %-97 %] 95 %  BP: (133-189)/() 166/78     Weight: 91.6 kg (201 lb 15.1 oz)  Body mass index is 26.64 kg/m².      Intake/Output Summary (Last 24 hours) at 5/8/2020 1311  Last data filed at 5/8/2020 0900  Gross per 24 hour   Intake 2726.67 ml   Output 5433 ml   Net -2706.33 ml       Physical Exam   Constitutional: He appears well-nourished. No distress.   HENT:   Head: Atraumatic.   Eyes: Pupils are equal, round, and reactive to light. Conjunctivae are normal.   Neck: No JVD present. No tracheal deviation present.   Cardiovascular: Normal rate and regular rhythm.   Pulmonary/Chest: Effort normal and breath sounds normal.   Abdominal: Soft. Bowel sounds are normal. He exhibits no distension.   Musculoskeletal: He exhibits no edema.   Neurological: He is alert. GCS eye subscore is 4. GCS verbal subscore is 4. GCS motor subscore is 6.   Skin: Skin is warm and dry. Capillary refill takes less than 2 seconds.       Vents:       Lines/Drains/Airways     Peripherally Inserted Central Catheter Line            PICC Double Lumen 05/07/20 0110 right basilic 1 day          Drain                 Urethral Catheter 05/06/20 1900 Double-lumen 1 day          Peripheral Intravenous Line                 Peripheral IV - Single Lumen 05/06/20 1813 20 G Anterior;Right Forearm 1 day                Significant Labs:    CBC/Anemia Profile:  Recent Labs   Lab 05/08/20  1131   WBC 15.73*   HGB 13.1*   HCT 39.5*      MCV 90   RDW 12.0        Chemistries:  Recent Labs   Lab 05/07/20  0411  05/07/20  1419  05/08/20  0349 05/08/20  0631 05/08/20  1131   *   < > 121*  121*   < > 128*  128*  128* 126* 127*   K 3.9   < > 4.2   < > 3.7 3.6 3.9   CL  --    < > 90*   < > 95 94* 94*   CO2  --    < > 21*   < > 24 24 22*   BUN  --    < > 14   < >  13 12 13   CREATININE  --    < > 0.8   < > 0.8 0.8 0.9   CALCIUM  --    < > 8.6*   < > 8.7 8.6* 8.4*   ALBUMIN  --   --  3.5  --   --   --   --    MG 2.1  --   --   --  2.1  --   --    PHOS  --   --  2.8  --   --   --   --     < > = values in this interval not displayed.       All pertinent labs within the past 24 hours have been reviewed.        ABG  No results for input(s): PH, PO2, PCO2, HCO3, BE in the last 168 hours.  Assessment/Plan:     Psychiatric  Anxiety disorder  Alprazolam as needed.  Holding SSRI.    Cardiac/Vascular  Hypertension, essential  Nifedipine, Hydralazine, Verapamil, and Carvedilol.  No ACE/ARB/Diuretic with acute hyponatremia.    Renal/  * Hyponatremia  Presumably due to central effect of Fluoxetine which has been stopped.  Initial attempts with fluid restriction and IV Normal Saline have not been effective.  Placed PICC line for possible 3% Saline infusion.  Received Tolvaptan 30 mg evening 06 May.  Continue to monitor urine output and serial chemistries.  Nephrology assisting with management.  Sodium Bicarbonate and Sodium Chloride tablets.  5/8 - improving after tolvaptan; discussed with nephrology, stopped D5; continue monitoring; consider transfer out of ICU in am if stable    Hypomagnesemia  Responded appropriately to replacement  monitor    Oncology  Leukocytosis  Uncertain etiology but no signs of infection.  Continue to monitor.    Other  Tobacco use  Encourage cessation  Offer resource information if amenable prior to discharge       Critical Care Daily Checklist:    A: Awake: RASS Goal/Actual Goal:    Actual: Good Agitation Sedation Scale (RASS): Drowsy   B: Spontaneous Breathing Trial Performed?     C: SAT & SBT Coordinated?  n/a                      D: Delirium: CAM-ICU Overall CAM-ICU: Positive   E: Early Mobility Performed? Yes   F: Feeding Goal:    Status:     Current Diet Order   Procedures    Diet Adult Regular (IDDSI Level 7)      AS: Analgesia/Sedation prn   T:  Thromboembolic Prophylaxis lovenox   H: HOB > 300 Yes   U: Stress Ulcer Prophylaxis (if needed) PPI   G: Glucose Control monitoring   B: Bowel Function Stool Occurrence: 0   I: Indwelling Catheter (Lines & Ordaz) Necessity reviewed   D: De-escalation of Antimicrobials/Pharmacotherapies reviewed    Plan for the day/ETD As above    Family/Goals of Care: Home on discharge   I have discussed case and plan of care in detail with Dr Guzman and Dr Mendez; Status and plan of care were discussed with team on multidisciplinary rounds.    Critical Care Time: 40 minutes  Critical secondary to Patient has an abrupt change in neurologic status s/t hyponatremia; Critical care was time spent personally by me on the following activities: development of treatment plan with patient or surrogate and bedside caregivers, discussions with consultants, evaluation of patient's response to treatment, examination of patient, ordering and performing treatments and interventions, ordering and review of laboratory studies, ordering and review of radiographic studies, pulse oximetry, re-evaluation of patient's condition. This critical care time did not overlap with that of any other provider or involve time for any procedures.     MANNY Em-BC  Critical Care Medicine  [facility]

## 2020-05-08 NOTE — NURSING
MD notified of patients verbalized and observed emotional state . Very anxious. SBP > 180 after giving PRN hydralazine. Orders received for Bidil x1 and xanax x1

## 2020-05-08 NOTE — SUBJECTIVE & OBJECTIVE
Review of Systems   Constitutional: Positive for malaise/fatigue.   HENT: Negative for congestion and sinus pain.    Respiratory: Negative for shortness of breath.    Cardiovascular: Negative for chest pain.   Gastrointestinal: Negative for abdominal pain, nausea and vomiting.   Musculoskeletal: Positive for joint pain and myalgias.   Neurological: Negative for tingling, tremors and focal weakness.   Psychiatric/Behavioral: The patient does not have insomnia.          Objective:     Vital Signs (Most Recent):  Temp: 98.8 °F (37.1 °C) (05/08/20 0700)  Pulse: 90 (05/08/20 1000)  Resp: 17 (05/08/20 1000)  BP: (!) 166/78 (05/08/20 1000)  SpO2: 95 % (05/08/20 1000) Vital Signs (24h Range):  Temp:  [98.1 °F (36.7 °C)-99.2 °F (37.3 °C)] 98.8 °F (37.1 °C)  Pulse:  [] 90  Resp:  [11-26] 17  SpO2:  [91 %-97 %] 95 %  BP: (133-189)/() 166/78     Weight: 91.6 kg (201 lb 15.1 oz)  Body mass index is 26.64 kg/m².      Intake/Output Summary (Last 24 hours) at 5/8/2020 1311  Last data filed at 5/8/2020 0900  Gross per 24 hour   Intake 2726.67 ml   Output 5433 ml   Net -2706.33 ml       Physical Exam   Constitutional: He appears well-nourished. No distress.   HENT:   Head: Atraumatic.   Eyes: Pupils are equal, round, and reactive to light. Conjunctivae are normal.   Neck: No JVD present. No tracheal deviation present.   Cardiovascular: Normal rate and regular rhythm.   Pulmonary/Chest: Effort normal and breath sounds normal.   Abdominal: Soft. Bowel sounds are normal. He exhibits no distension.   Musculoskeletal: He exhibits no edema.   Neurological: He is alert. GCS eye subscore is 4. GCS verbal subscore is 4. GCS motor subscore is 6.   Skin: Skin is warm and dry. Capillary refill takes less than 2 seconds.       Vents:       Lines/Drains/Airways     Peripherally Inserted Central Catheter Line            PICC Double Lumen 05/07/20 0110 right basilic 1 day          Drain                 Urethral Catheter 05/06/20 2609  Double-lumen 1 day          Peripheral Intravenous Line                 Peripheral IV - Single Lumen 05/06/20 1813 20 G Anterior;Right Forearm 1 day                Significant Labs:    CBC/Anemia Profile:  Recent Labs   Lab 05/08/20  1131   WBC 15.73*   HGB 13.1*   HCT 39.5*      MCV 90   RDW 12.0        Chemistries:  Recent Labs   Lab 05/07/20  0411  05/07/20  1419  05/08/20  0349 05/08/20  0631 05/08/20  1131   *   < > 121*  121*   < > 128*  128*  128* 126* 127*   K 3.9   < > 4.2   < > 3.7 3.6 3.9   CL  --    < > 90*   < > 95 94* 94*   CO2  --    < > 21*   < > 24 24 22*   BUN  --    < > 14   < > 13 12 13   CREATININE  --    < > 0.8   < > 0.8 0.8 0.9   CALCIUM  --    < > 8.6*   < > 8.7 8.6* 8.4*   ALBUMIN  --   --  3.5  --   --   --   --    MG 2.1  --   --   --  2.1  --   --    PHOS  --   --  2.8  --   --   --   --     < > = values in this interval not displayed.       All pertinent labs within the past 24 hours have been reviewed.

## 2020-05-08 NOTE — HOSPITAL COURSE
5/7 - transferred to ICU overnight; awake and alert but minimal interaction with caregivers, no verbalization; given 2nd dose talvaptan per nephrology and noted dramatic increase in urine output  5/8 - More awake and interactive today, OOB to chair with nursing assist; Na trend up overnight, peaked at 128 before initiation of D5 IVF by nephrology; this am Na trend down at 126  5/9 - resting, easily aroused this morning, conversation requires frequent stimuli; complains of malaise and myalgias, non specific along with complaint of cold breakfast and wanting to rest  5/10 - sodium peaked at 129 yesterday and slow trend down today at 125; continues to require prn meds for HTN; reported not sleeping at night, confused and hallucinating this morning   5/11 - sodium holding @ 127; awake and alert this morning, complains of some muscle weakness and intermittent tremor, confused to recent events only and no current hallucinations; RN reports slept ~5 hours last night  5/12 - Na 127; awake alert and interacting appropriately this morning with no complications or issues overnight

## 2020-05-08 NOTE — PLAN OF CARE
PT HAS BEEN A-FEBRILE.  SERUM NA INCREASED TODAY .  PT HAS BECOME MORE ORIENTED THROUGHOUT THE DAY. HE DOES REMEMBER THAT HIS SODIUM WAS LOW AND THAT HE CAME TO THE HOSPITAL BUT WAS IN ANOTHER ROOM.  HE KNOWS IT IS MAY OF 2020 BUT DIDN'T REALIZE HE HAD BEEN HERE FOR THREE DAYS.  PT'S HEART RHYTHM NORMALIZED BY 8 AM TODAY AND HE HAS HAD NO REOCCURRENCES OF BRADYCARDIA ( IN THE HIGH 20'S) SINCE.  HE HAS BEEN NSR TO .  THIS EVENING HE HAS BECOME SLIGHTLY HYPERTENSIVE 'S.  POC REVIEWED WITH PT WIFE (DARELL) AND SISTER.  QUESTIONS ANSWERED AND WIFE/SON SPOKE WITH PT VIA PT CELL PHONE.   PT HAS BEEN TURNED EVERY TWO HOURS WITH FOAM WEDGE AND HEELS HAVE BEEN FLOATED.  HE REFUSED BREAKFAST AND SUPPER BUT DID EAT ABOUT HALF OF HIS LUNCH.

## 2020-05-08 NOTE — ASSESSMENT & PLAN NOTE
Presumably due to central effect of Fluoxetine which has been stopped.  Initial attempts with fluid restriction and IV Normal Saline have not been effective.  Placed PICC line for possible 3% Saline infusion.  Received Tolvaptan 30 mg evening 06 May.  Continue to monitor urine output and serial chemistries.  Nephrology assisting with management.  Sodium Bicarbonate and Sodium Chloride tablets.  5/8 - improving after tolvaptan; discussed with nephrology, stopped D5; continue monitoring; consider transfer out of ICU in am if stable

## 2020-05-08 NOTE — SUBJECTIVE & OBJECTIVE
Interval History:     5/8/2020: Sodium now at 126. S/p Tolvaptan yesterday.       Review of patient's allergies indicates:   Allergen Reactions    Codeine     Penicillins      Current Facility-Administered Medications   Medication Frequency    acetaminophen tablet 650 mg Q6H PRN    albuterol-ipratropium 2.5 mg-0.5 mg/3 mL nebulizer solution 3 mL Q4H PRN    ALPRAZolam tablet 0.5 mg Nightly PRN    aluminum-magnesium hydroxide-simethicone 200-200-20 mg/5 mL suspension 30 mL Q6H PRN    aspirin chewable tablet 81 mg Daily    busPIRone tablet 5 mg TID    carvediloL tablet 25 mg BID    cyclobenzaprine tablet 10 mg TID PRN    dextrose 5 % infusion Continuous    diphenhydrAMINE capsule 25 mg Q6H PRN    enoxaparin injection 40 mg Daily    guaifenesin 100 mg/5 ml syrup 200 mg Q4H PRN    hydrALAZINE injection 20 mg Q6H PRN    hydrALAZINE tablet 10 mg Q8H    influenza (QUADRIVALENT PF) vaccine 0.5 mL vaccine x 1 dose    magnesium oxide tablet 400 mg BID    nicotine 21 mg/24 hr 1 patch Daily    NIFEdipine 24 hr tablet 30 mg Daily    ondansetron injection 4 mg Q8H PRN    pantoprazole EC tablet 40 mg Daily    simvastatin tablet 40 mg QHS    sodium chloride 0.9% flush 10 mL Q6H    And    sodium chloride 0.9% flush 10 mL PRN    tamsulosin 24 hr capsule 0.4 mg BID    verapamiL CR tablet 180 mg Daily       Objective:     Vital Signs (Most Recent):  Temp: 99.2 °F (37.3 °C) (05/08/20 0400)  Pulse: 88 (05/08/20 0600)  Resp: 12 (05/08/20 0600)  BP: (!) 189/86 (05/08/20 0300)  SpO2: 95 % (05/08/20 0600)  O2 Device (Oxygen Therapy): room air (05/07/20 1905) Vital Signs (24h Range):  Temp:  [98.1 °F (36.7 °C)-99.2 °F (37.3 °C)] 99.2 °F (37.3 °C)  Pulse:  [] 88  Resp:  [11-25] 12  SpO2:  [91 %-97 %] 95 %  BP: (133-189)/(65-92) 189/86     Weight: 91.6 kg (201 lb 15.1 oz) (05/06/20 2300)  Body mass index is 26.64 kg/m².  Body surface area is 2.17 meters squared.    I/O last 3 completed shifts:  In: 3778.3  [P.O.:530; I.V.:3248.3]  Out: 6180 [Urine:6180]    Physical Exam   Constitutional: He appears well-developed. He is cooperative.   HENT:   Head: Normocephalic.   Nose: No rhinorrhea.   Mouth/Throat: Mucous membranes are normal. No oropharyngeal exudate.   Neck: No thyroid mass present.   Cardiovascular: Normal rate, regular rhythm, S1 normal, S2 normal and intact distal pulses.   Pulmonary/Chest: Effort normal. No respiratory distress. He has no wheezes.   Abdominal: Soft. Bowel sounds are normal. He exhibits no distension. There is no tenderness. No hernia.   Lymphadenopathy:     He has no cervical adenopathy.   Neurological: He is alert.   Responsive.   Skin: Skin is warm and dry.       Significant Labs:  Lab Results   Component Value Date    CREATININE 0.8 05/08/2020    BUN 12 05/08/2020     (L) 05/08/2020    K 3.6 05/08/2020    CL 94 (L) 05/08/2020    CO2 24 05/08/2020     Lab Results   Component Value Date    CALCIUM 8.6 (L) 05/08/2020    PHOS 2.8 05/07/2020     Lab Results   Component Value Date    ALBUMIN 3.5 05/07/2020     Lab Results   Component Value Date    WBC 13.14 (H) 05/06/2020    HGB 14.6 05/06/2020    HCT 42.2 05/06/2020    MCV 87 05/06/2020     05/06/2020       Recent Labs   Lab 05/08/20  0349   MG 2.1         Significant Imaging:  Imaging Results          CT Head Without Contrast (Final result)  Result time 05/04/20 18:10:27    Final result by Calvin Bourgeois MD (05/04/20 18:10:27)                 Impression:      No acute findings.    All CT scans at this facility are performed  using dose modulation techniques as appropriate to performed exam including the following:  automated exposure control; adjustment of mA and/or kV according to the patients size (this includes techniques or standardized protocols for targeted exams where dose is matched to indication/reason for exam: i.e. extremities or head);  iterative reconstruction technique.      Electronically signed by: Calvin  Christelle  Date:    05/04/2020  Time:    18:10             Narrative:    EXAMINATION:  CT HEAD WITHOUT CONTRAST    CLINICAL HISTORY:  Dizziness;.    TECHNIQUE:  Low dose axial images were obtained through the head.  Coronal and sagittal reformations were also performed. Contrast was not administered.    COMPARISON:  None.    FINDINGS:  Midline structures are intact. Ventricles are of normal size and configuration. Brain parenchyma is normal with normal differentiation of the gray-white matter.    No intracranial hemorrhage,acute infarct, or suspicious intracranial lesion is identified.    Skull base and calvarium are normal. Visualized sinuses and mastoid air cells are clear.                               X-Ray Chest AP Portable (Final result)  Result time 05/04/20 15:26:29    Final result by Kal Arreaga MD (05/04/20 15:26:29)                 Impression:      No acute findings.      Electronically signed by: Kal Arreaga MD  Date:    05/04/2020  Time:    15:26             Narrative:    EXAMINATION:  XR CHEST AP PORTABLE    CLINICAL HISTORY:  Chest Pain;    TECHNIQUE:  Single frontal view of the chest was performed.    COMPARISON:  None    FINDINGS:  The cardiomediastinal silhouette is normal.    The lungs are clear.  No pleural effusions.    Multilevel spondylosis in the spine.

## 2020-05-08 NOTE — ASSESSMENT & PLAN NOTE
1.  Hyponatremia:  Possibly due to SIADH, at least in part due to his SSRI meds,  elevated urine osmolality, TSH and Cortisol OK, head CT negative for malignancy, patient received a dose of tolvaptan yesterday,  baseline sodium about 125-130, Na was 126 today. Monitor for now. Will stop D5 IV fluids.     2.  Hypertension: titrate meds as needed.     3.  Stable renal function, serumCr 0.8 mg/dl.     4.  Metabolic acidosis: cont  sodium bicarbonate supplements.

## 2020-05-08 NOTE — PROGRESS NOTES
Care assumed. Chart and labs reviewed. VSS. Pt alert and follows commands. Slow to verbal response. Denies pain.Pt  Spoke with wife on phone. Safety maintained,

## 2020-05-08 NOTE — PROGRESS NOTES
[facility]  Nephrology  Progress Note    Patient Name: Juwan Pozo  MRN: [id number]  Admission Date: 5/4/2020  Hospital Length of Stay: 4 days  Attending Provider: Timoteo Guzman MD   Primary Care Physician: Primary Doctor No  Principal Problem:Hyponatremia    Subjective:     HPI: Juwan Pozo is a 59-year-old  man with history of hypertension, anxiety, PTSD, Major depression, on medications including Prozac, patient was admitted to the hospital yesterday for generalized weakness, his serum sodium on presentation was 121, initially thought that he might be dehydrated hydration was started on IV fluids, his serum sodium decreased to 117, we were consulted  because of worsening hyponatremia, renal function is stable with serum creatinine 0.8 mg/dL,    Interval History:     5/8/2020: Sodium now at 126. S/p Tolvaptan yesterday.       Review of patient's allergies indicates:   Allergen Reactions    Codeine     Penicillins      Current Facility-Administered Medications   Medication Frequency    acetaminophen tablet 650 mg Q6H PRN    albuterol-ipratropium 2.5 mg-0.5 mg/3 mL nebulizer solution 3 mL Q4H PRN    ALPRAZolam tablet 0.5 mg Nightly PRN    aluminum-magnesium hydroxide-simethicone 200-200-20 mg/5 mL suspension 30 mL Q6H PRN    aspirin chewable tablet 81 mg Daily    busPIRone tablet 5 mg TID    carvediloL tablet 25 mg BID    cyclobenzaprine tablet 10 mg TID PRN    dextrose 5 % infusion Continuous    diphenhydrAMINE capsule 25 mg Q6H PRN    enoxaparin injection 40 mg Daily    guaifenesin 100 mg/5 ml syrup 200 mg Q4H PRN    hydrALAZINE injection 20 mg Q6H PRN    hydrALAZINE tablet 10 mg Q8H    influenza (QUADRIVALENT PF) vaccine 0.5 mL vaccine x 1 dose    magnesium oxide tablet 400 mg BID    nicotine 21 mg/24 hr 1 patch Daily    NIFEdipine 24 hr tablet 30 mg Daily    ondansetron injection 4 mg Q8H PRN    pantoprazole EC tablet 40 mg Daily    simvastatin tablet 40  mg QHS    sodium chloride 0.9% flush 10 mL Q6H    And    sodium chloride 0.9% flush 10 mL PRN    tamsulosin 24 hr capsule 0.4 mg BID    verapamiL CR tablet 180 mg Daily       Objective:     Vital Signs (Most Recent):  Temp: 99.2 °F (37.3 °C) (05/08/20 0400)  Pulse: 88 (05/08/20 0600)  Resp: 12 (05/08/20 0600)  BP: (!) 189/86 (05/08/20 0300)  SpO2: 95 % (05/08/20 0600)  O2 Device (Oxygen Therapy): room air (05/07/20 1905) Vital Signs (24h Range):  Temp:  [98.1 °F (36.7 °C)-99.2 °F (37.3 °C)] 99.2 °F (37.3 °C)  Pulse:  [] 88  Resp:  [11-25] 12  SpO2:  [91 %-97 %] 95 %  BP: (133-189)/(65-92) 189/86     Weight: 91.6 kg (201 lb 15.1 oz) (05/06/20 2300)  Body mass index is 26.64 kg/m².  Body surface area is 2.17 meters squared.    I/O last 3 completed shifts:  In: 3778.3 [P.O.:530; I.V.:3248.3]  Out: 6180 [Urine:6180]    Physical Exam   Constitutional: He appears well-developed. He is cooperative.   HENT:   Head: Normocephalic.   Nose: No rhinorrhea.   Mouth/Throat: Mucous membranes are normal. No oropharyngeal exudate.   Neck: No thyroid mass present.   Cardiovascular: Normal rate, regular rhythm, S1 normal, S2 normal and intact distal pulses.   Pulmonary/Chest: Effort normal. No respiratory distress. He has no wheezes.   Abdominal: Soft. Bowel sounds are normal. He exhibits no distension. There is no tenderness. No hernia.   Lymphadenopathy:     He has no cervical adenopathy.   Neurological: He is alert.   Responsive.   Skin: Skin is warm and dry.       Significant Labs:  Lab Results   Component Value Date    CREATININE 0.8 05/08/2020    BUN 12 05/08/2020     (L) 05/08/2020    K 3.6 05/08/2020    CL 94 (L) 05/08/2020    CO2 24 05/08/2020     Lab Results   Component Value Date    CALCIUM 8.6 (L) 05/08/2020    PHOS 2.8 05/07/2020     Lab Results   Component Value Date    ALBUMIN 3.5 05/07/2020     Lab Results   Component Value Date    WBC 13.14 (H) 05/06/2020    HGB 14.6 05/06/2020    HCT 42.2 05/06/2020     MCV 87 05/06/2020     05/06/2020       Recent Labs   Lab 05/08/20  0349   MG 2.1         Significant Imaging:  Imaging Results          CT Head Without Contrast (Final result)  Result time 05/04/20 18:10:27    Final result by Calvin Bourgeois MD (05/04/20 18:10:27)                 Impression:      No acute findings.    All CT scans at this facility are performed  using dose modulation techniques as appropriate to performed exam including the following:  automated exposure control; adjustment of mA and/or kV according to the patients size (this includes techniques or standardized protocols for targeted exams where dose is matched to indication/reason for exam: i.e. extremities or head);  iterative reconstruction technique.      Electronically signed by: Calvin Bourgeois  Date:    05/04/2020  Time:    18:10             Narrative:    EXAMINATION:  CT HEAD WITHOUT CONTRAST    CLINICAL HISTORY:  Dizziness;.    TECHNIQUE:  Low dose axial images were obtained through the head.  Coronal and sagittal reformations were also performed. Contrast was not administered.    COMPARISON:  None.    FINDINGS:  Midline structures are intact. Ventricles are of normal size and configuration. Brain parenchyma is normal with normal differentiation of the gray-white matter.    No intracranial hemorrhage,acute infarct, or suspicious intracranial lesion is identified.    Skull base and calvarium are normal. Visualized sinuses and mastoid air cells are clear.                               X-Ray Chest AP Portable (Final result)  Result time 05/04/20 15:26:29    Final result by Kal Arreaga MD (05/04/20 15:26:29)                 Impression:      No acute findings.      Electronically signed by: Kal Arreaga MD  Date:    05/04/2020  Time:    15:26             Narrative:    EXAMINATION:  XR CHEST AP PORTABLE    CLINICAL HISTORY:  Chest Pain;    TECHNIQUE:  Single frontal view of the chest was  performed.    COMPARISON:  None    FINDINGS:  The cardiomediastinal silhouette is normal.    The lungs are clear.  No pleural effusions.    Multilevel spondylosis in the spine.                                  Assessment/Plan:     * Hyponatremia  1.  Hyponatremia:  Possibly due to SIADH, at least in part due to his SSRI meds,  elevated urine osmolality, TSH and Cortisol OK, head CT negative for malignancy, patient received a dose of tolvaptan yesterday,  baseline sodium about 125-130, Na was 126 today. Monitor for now. Will stop D5 IV fluids.     2.  Hypertension: titrate meds as needed.     3.  Stable renal function, serumCr 0.8 mg/dl.     4.  Metabolic acidosis: cont  sodium bicarbonate supplements.         Total ICU time about 35 minutes. Chart review and case discussion with ICU team required > 50% of time.     Thank you for your consult. I will follow-up with patient. Please contact us if you have any additional questions.    Cem Mendez MD  Nephrology  [facility]

## 2020-05-09 PROBLEM — G93.41 ENCEPHALOPATHY, METABOLIC: Status: ACTIVE | Noted: 2020-05-09

## 2020-05-09 LAB
ANION GAP SERPL CALC-SCNC: 11 MMOL/L (ref 8–16)
ANION GAP SERPL CALC-SCNC: 11 MMOL/L (ref 8–16)
ANION GAP SERPL CALC-SCNC: 13 MMOL/L (ref 8–16)
ANION GAP SERPL CALC-SCNC: 9 MMOL/L (ref 8–16)
BASOPHILS # BLD AUTO: 0.08 K/UL (ref 0–0.2)
BASOPHILS NFR BLD: 0.6 % (ref 0–1.9)
BUN SERPL-MCNC: 14 MG/DL (ref 6–20)
BUN SERPL-MCNC: 15 MG/DL (ref 6–20)
BUN SERPL-MCNC: 16 MG/DL (ref 6–20)
BUN SERPL-MCNC: 16 MG/DL (ref 6–20)
CALCIUM SERPL-MCNC: 8.7 MG/DL (ref 8.7–10.5)
CALCIUM SERPL-MCNC: 8.8 MG/DL (ref 8.7–10.5)
CALCIUM SERPL-MCNC: 8.9 MG/DL (ref 8.7–10.5)
CALCIUM SERPL-MCNC: 8.9 MG/DL (ref 8.7–10.5)
CHLORIDE SERPL-SCNC: 94 MMOL/L (ref 95–110)
CHLORIDE SERPL-SCNC: 96 MMOL/L (ref 95–110)
CO2 SERPL-SCNC: 22 MMOL/L (ref 23–29)
CO2 SERPL-SCNC: 24 MMOL/L (ref 23–29)
CREAT SERPL-MCNC: 0.8 MG/DL (ref 0.5–1.4)
CREAT SERPL-MCNC: 0.8 MG/DL (ref 0.5–1.4)
CREAT SERPL-MCNC: 0.9 MG/DL (ref 0.5–1.4)
CREAT SERPL-MCNC: 0.9 MG/DL (ref 0.5–1.4)
DIFFERENTIAL METHOD: ABNORMAL
EOSINOPHIL # BLD AUTO: 0.1 K/UL (ref 0–0.5)
EOSINOPHIL NFR BLD: 0.7 % (ref 0–8)
ERYTHROCYTE [DISTWIDTH] IN BLOOD BY AUTOMATED COUNT: 12.1 % (ref 11.5–14.5)
EST. GFR  (AFRICAN AMERICAN): >60 ML/MIN/1.73 M^2
EST. GFR  (NON AFRICAN AMERICAN): >60 ML/MIN/1.73 M^2
GLUCOSE SERPL-MCNC: 100 MG/DL (ref 70–110)
GLUCOSE SERPL-MCNC: 101 MG/DL (ref 70–110)
GLUCOSE SERPL-MCNC: 108 MG/DL (ref 70–110)
GLUCOSE SERPL-MCNC: 95 MG/DL (ref 70–110)
HCT VFR BLD AUTO: 37.5 % (ref 40–54)
HGB BLD-MCNC: 12.7 G/DL (ref 14–18)
IMM GRANULOCYTES # BLD AUTO: 0.05 K/UL (ref 0–0.04)
IMM GRANULOCYTES NFR BLD AUTO: 0.4 % (ref 0–0.5)
LYMPHOCYTES # BLD AUTO: 2.2 K/UL (ref 1–4.8)
LYMPHOCYTES NFR BLD: 15.7 % (ref 18–48)
MAGNESIUM SERPL-MCNC: 1.9 MG/DL (ref 1.6–2.6)
MCH RBC QN AUTO: 30 PG (ref 27–31)
MCHC RBC AUTO-ENTMCNC: 33.9 G/DL (ref 32–36)
MCV RBC AUTO: 88 FL (ref 82–98)
MONOCYTES # BLD AUTO: 1.6 K/UL (ref 0.3–1)
MONOCYTES NFR BLD: 11.8 % (ref 4–15)
NEUTROPHILS # BLD AUTO: 9.7 K/UL (ref 1.8–7.7)
NEUTROPHILS NFR BLD: 70.8 % (ref 38–73)
NRBC BLD-RTO: 0 /100 WBC
PLATELET # BLD AUTO: 249 K/UL (ref 150–350)
PMV BLD AUTO: 9.4 FL (ref 9.2–12.9)
POTASSIUM SERPL-SCNC: 3.7 MMOL/L (ref 3.5–5.1)
POTASSIUM SERPL-SCNC: 3.8 MMOL/L (ref 3.5–5.1)
POTASSIUM SERPL-SCNC: 4 MMOL/L (ref 3.5–5.1)
POTASSIUM SERPL-SCNC: 4.2 MMOL/L (ref 3.5–5.1)
RBC # BLD AUTO: 4.24 M/UL (ref 4.6–6.2)
SODIUM SERPL-SCNC: 129 MMOL/L (ref 136–145)
WBC # BLD AUTO: 13.69 K/UL (ref 3.9–12.7)

## 2020-05-09 PROCEDURE — 25000003 PHARM REV CODE 250: Performed by: INTERNAL MEDICINE

## 2020-05-09 PROCEDURE — A4216 STERILE WATER/SALINE, 10 ML: HCPCS | Performed by: INTERNAL MEDICINE

## 2020-05-09 PROCEDURE — 99291 CRITICAL CARE FIRST HOUR: CPT | Mod: ,,, | Performed by: NURSE PRACTITIONER

## 2020-05-09 PROCEDURE — S4991 NICOTINE PATCH NONLEGEND: HCPCS | Performed by: INTERNAL MEDICINE

## 2020-05-09 PROCEDURE — 25000003 PHARM REV CODE 250: Performed by: FAMILY MEDICINE

## 2020-05-09 PROCEDURE — 25000003 PHARM REV CODE 250: Performed by: EMERGENCY MEDICINE

## 2020-05-09 PROCEDURE — 63600175 PHARM REV CODE 636 W HCPCS: Performed by: INTERNAL MEDICINE

## 2020-05-09 PROCEDURE — 99900037 HC PT THERAPY SCREENING (STAT)

## 2020-05-09 PROCEDURE — 80048 BASIC METABOLIC PNL TOTAL CA: CPT | Mod: 91

## 2020-05-09 PROCEDURE — 63600175 PHARM REV CODE 636 W HCPCS: Performed by: FAMILY MEDICINE

## 2020-05-09 PROCEDURE — 99233 PR SUBSEQUENT HOSPITAL CARE,LEVL III: ICD-10-PCS | Mod: ,,, | Performed by: INTERNAL MEDICINE

## 2020-05-09 PROCEDURE — 25000003 PHARM REV CODE 250: Performed by: NURSE PRACTITIONER

## 2020-05-09 PROCEDURE — 83735 ASSAY OF MAGNESIUM: CPT

## 2020-05-09 PROCEDURE — 99900038 HC OT GENERIC THERAPY SCREENING (STAT)

## 2020-05-09 PROCEDURE — 99233 SBSQ HOSP IP/OBS HIGH 50: CPT | Mod: ,,, | Performed by: INTERNAL MEDICINE

## 2020-05-09 PROCEDURE — 85025 COMPLETE CBC W/AUTO DIFF WBC: CPT

## 2020-05-09 PROCEDURE — 99291 PR CRITICAL CARE, E/M 30-74 MINUTES: ICD-10-PCS | Mod: ,,, | Performed by: NURSE PRACTITIONER

## 2020-05-09 PROCEDURE — 20000000 HC ICU ROOM

## 2020-05-09 PROCEDURE — 84295 ASSAY OF SERUM SODIUM: CPT

## 2020-05-09 RX ORDER — QUETIAPINE FUMARATE 25 MG/1
25 TABLET, FILM COATED ORAL NIGHTLY
Status: DISCONTINUED | OUTPATIENT
Start: 2020-05-09 | End: 2020-05-10

## 2020-05-09 RX ORDER — CLONIDINE HYDROCHLORIDE 0.1 MG/1
0.1 TABLET ORAL ONCE
Status: COMPLETED | OUTPATIENT
Start: 2020-05-09 | End: 2020-05-09

## 2020-05-09 RX ORDER — POTASSIUM CHLORIDE 20 MEQ/1
40 TABLET, EXTENDED RELEASE ORAL ONCE
Status: COMPLETED | OUTPATIENT
Start: 2020-05-09 | End: 2020-05-09

## 2020-05-09 RX ORDER — CHLORDIAZEPOXIDE HYDROCHLORIDE 10 MG/1
10 CAPSULE, GELATIN COATED ORAL 3 TIMES DAILY
Status: DISCONTINUED | OUTPATIENT
Start: 2020-05-09 | End: 2020-05-09

## 2020-05-09 RX ORDER — DEXTROSE MONOHYDRATE 50 MG/ML
INJECTION, SOLUTION INTRAVENOUS CONTINUOUS
Status: DISCONTINUED | OUTPATIENT
Start: 2020-05-09 | End: 2020-05-09

## 2020-05-09 RX ADMIN — TAMSULOSIN HYDROCHLORIDE 0.4 MG: 0.4 CAPSULE ORAL at 08:05

## 2020-05-09 RX ADMIN — QUETIAPINE FUMARATE 25 MG: 25 TABLET ORAL at 09:05

## 2020-05-09 RX ADMIN — BUSPIRONE HYDROCHLORIDE 5 MG: 5 TABLET ORAL at 09:05

## 2020-05-09 RX ADMIN — Medication 10 ML: at 06:05

## 2020-05-09 RX ADMIN — LORAZEPAM 0.5 MG: 2 INJECTION INTRAMUSCULAR; INTRAVENOUS at 12:05

## 2020-05-09 RX ADMIN — PANTOPRAZOLE SODIUM 40 MG: 40 TABLET, DELAYED RELEASE ORAL at 08:05

## 2020-05-09 RX ADMIN — HYDRALAZINE HYDROCHLORIDE 10 MG: 10 TABLET, FILM COATED ORAL at 06:05

## 2020-05-09 RX ADMIN — POTASSIUM CHLORIDE 40 MEQ: 20 TABLET, EXTENDED RELEASE ORAL at 08:05

## 2020-05-09 RX ADMIN — DEXTROSE: 5 INJECTION, SOLUTION INTRAVENOUS at 02:05

## 2020-05-09 RX ADMIN — ENOXAPARIN SODIUM 40 MG: 100 INJECTION SUBCUTANEOUS at 04:05

## 2020-05-09 RX ADMIN — CLONIDINE HYDROCHLORIDE 0.1 MG: 0.1 TABLET ORAL at 02:05

## 2020-05-09 RX ADMIN — LABETALOL HYDROCHLORIDE 10 MG: 5 INJECTION, SOLUTION INTRAVENOUS at 07:05

## 2020-05-09 RX ADMIN — HYDRALAZINE HYDROCHLORIDE 20 MG: 20 INJECTION INTRAMUSCULAR; INTRAVENOUS at 09:05

## 2020-05-09 RX ADMIN — ASPIRIN 81 MG 81 MG: 81 TABLET ORAL at 08:05

## 2020-05-09 RX ADMIN — Medication 10 ML: at 12:05

## 2020-05-09 RX ADMIN — CARVEDILOL 25 MG: 12.5 TABLET, FILM COATED ORAL at 08:05

## 2020-05-09 RX ADMIN — Medication 1 PATCH: at 08:05

## 2020-05-09 RX ADMIN — Medication 400 MG: at 09:05

## 2020-05-09 RX ADMIN — CYCLOBENZAPRINE 10 MG: 10 TABLET, FILM COATED ORAL at 08:05

## 2020-05-09 RX ADMIN — Medication 400 MG: at 08:05

## 2020-05-09 RX ADMIN — SIMVASTATIN 40 MG: 20 TABLET, FILM COATED ORAL at 09:05

## 2020-05-09 RX ADMIN — TAMSULOSIN HYDROCHLORIDE 0.4 MG: 0.4 CAPSULE ORAL at 09:05

## 2020-05-09 RX ADMIN — CARVEDILOL 25 MG: 12.5 TABLET, FILM COATED ORAL at 09:05

## 2020-05-09 RX ADMIN — VERAPAMIL HYDROCHLORIDE 180 MG: 180 TABLET, FILM COATED, EXTENDED RELEASE ORAL at 08:05

## 2020-05-09 RX ADMIN — HYDRALAZINE HYDROCHLORIDE 20 MG: 20 INJECTION INTRAMUSCULAR; INTRAVENOUS at 10:05

## 2020-05-09 RX ADMIN — CYCLOBENZAPRINE 10 MG: 10 TABLET, FILM COATED ORAL at 04:05

## 2020-05-09 RX ADMIN — HYDRALAZINE HYDROCHLORIDE 20 MG: 20 INJECTION INTRAMUSCULAR; INTRAVENOUS at 01:05

## 2020-05-09 RX ADMIN — BUSPIRONE HYDROCHLORIDE 5 MG: 5 TABLET ORAL at 08:05

## 2020-05-09 RX ADMIN — BUSPIRONE HYDROCHLORIDE 5 MG: 5 TABLET ORAL at 04:05

## 2020-05-09 RX ADMIN — FOLIC ACID: 5 INJECTION, SOLUTION INTRAMUSCULAR; INTRAVENOUS; SUBCUTANEOUS at 07:05

## 2020-05-09 RX ADMIN — NIFEDIPINE 30 MG: 30 TABLET, FILM COATED, EXTENDED RELEASE ORAL at 08:05

## 2020-05-09 RX ADMIN — HYDRALAZINE HYDROCHLORIDE 10 MG: 10 TABLET, FILM COATED ORAL at 04:05

## 2020-05-09 RX ADMIN — HYDRALAZINE HYDROCHLORIDE 10 MG: 10 TABLET, FILM COATED ORAL at 09:05

## 2020-05-09 NOTE — NURSING
MD notified of patients BP unresponsive to prn labetalol and hydralazine. Clonidine x1 dose ordered

## 2020-05-09 NOTE — PT/OT/SLP PROGRESS
Occupational Therapy      Patient Name:  Juwan Pozo   MRN:  [id number]    Eval initiated via chart review in Epic and discussion with RN. Patient not seen today secondary to RN Home stated to therapy team member patient too confused and to wait until tomorrow. Will follow-up on next session.        Noreen Moody, OT  5/9/2020   [id number]

## 2020-05-09 NOTE — PROGRESS NOTES
[facility]   Nephrology  Progress Note    Patient Name: Juwan Pozo  MRN: [id number]  Admission Date: 5/4/2020  Hospital Length of Stay: 5 days  Attending Provider: Timoteo Guzman MD   Primary Care Physician: Primary Doctor No  Principal Problem:Hyponatremia    Subjective:     HPI: Juwan Pozo is a 59-year-old  man with history of hypertension, anxiety, PTSD, Major depression, on medications including Prozac, patient was admitted to the hospital yesterday for generalized weakness, his serum sodium on presentation was 121, initially thought that he might be dehydrated hydration was started on IV fluids, his serum sodium decreased to 117, we were consulted  because of worsening hyponatremia, renal function is stable with serum creatinine 0.8 mg/dL,    Interval History:     5/8/2020: Sodium now at 126. S/p Tolvaptan yesterday.     5/9/20: Sodium level has increased to 129 today.         Review of patient's allergies indicates:   Allergen Reactions    Codeine     Penicillins      Current Facility-Administered Medications   Medication Frequency    acetaminophen tablet 650 mg Q6H PRN    albuterol-ipratropium 2.5 mg-0.5 mg/3 mL nebulizer solution 3 mL Q4H PRN    ALPRAZolam tablet 0.5 mg Nightly PRN    aluminum-magnesium hydroxide-simethicone 200-200-20 mg/5 mL suspension 30 mL Q6H PRN    aspirin chewable tablet 81 mg Daily    busPIRone tablet 5 mg TID    carvediloL tablet 25 mg BID    cyclobenzaprine tablet 10 mg TID PRN    dextrose 5 % infusion Continuous    diphenhydrAMINE capsule 25 mg Q6H PRN    enoxaparin injection 40 mg Daily    guaifenesin 100 mg/5 ml syrup 200 mg Q4H PRN    hydrALAZINE injection 20 mg Q6H PRN    hydrALAZINE tablet 10 mg Q8H    influenza (QUADRIVALENT PF) vaccine 0.5 mL vaccine x 1 dose    labetaloL injection 10 mg Q6H PRN    lorazepam (ATIVAN) injection 2 mg Q4H PRN    magnesium oxide tablet 400 mg BID    nicotine 21 mg/24 hr 1 patch Daily     NIFEdipine 24 hr tablet 30 mg Daily    ondansetron injection 4 mg Q8H PRN    pantoprazole EC tablet 40 mg Daily    simvastatin tablet 40 mg QHS    sodium chloride 0.9% flush 10 mL Q6H    And    sodium chloride 0.9% flush 10 mL PRN    tamsulosin 24 hr capsule 0.4 mg BID    verapamiL CR tablet 180 mg Daily       Objective:     Vital Signs (Most Recent):  Temp: 99 °F (37.2 °C) (05/08/20 1900)  Pulse: 92 (05/09/20 0801)  Resp: 20 (05/09/20 0400)  BP: (!) 172/102 (05/09/20 0801)  SpO2: (!) 94 % (05/09/20 0400)  O2 Device (Oxygen Therapy): room air (05/08/20 2000) Vital Signs (24h Range):  Temp:  [98.2 °F (36.8 °C)-99 °F (37.2 °C)] 99 °F (37.2 °C)  Pulse:  [] 92  Resp:  [16-23] 20  SpO2:  [86 %-98 %] 94 %  BP: (144-199)/() 172/102     Weight: 91.6 kg (201 lb 15.1 oz) (05/06/20 2300)  Body mass index is 26.64 kg/m².  Body surface area is 2.17 meters squared.    I/O last 3 completed shifts:  In: 2462.5 [P.O.:1000; I.V.:1462.5]  Out: 5235 [Urine:5235]    Physical Exam   Constitutional: He appears well-developed. He is cooperative.   HENT:   Head: Normocephalic.   Nose: No rhinorrhea.   Mouth/Throat: Mucous membranes are normal. No oropharyngeal exudate.   Neck: No thyroid mass present.   Cardiovascular: Normal rate, regular rhythm, S1 normal, S2 normal and intact distal pulses.   Pulmonary/Chest: Effort normal. No respiratory distress. He has no wheezes.   Abdominal: Soft. Bowel sounds are normal. He exhibits no distension. There is no tenderness. No hernia.   Lymphadenopathy:     He has no cervical adenopathy.   Neurological: He is alert.   Responsive.   Skin: Skin is warm.       Significant Labs:  Lab Results   Component Value Date    CREATININE 0.8 05/09/2020    BUN 16 05/09/2020     (L) 05/09/2020    K 4.0 05/09/2020    CL 94 (L) 05/09/2020    CO2 24 05/09/2020     Lab Results   Component Value Date    CALCIUM 8.9 05/09/2020    PHOS 2.8 05/07/2020     Lab Results   Component Value Date    ALBUMIN  3.5 05/07/2020     Lab Results   Component Value Date    WBC 13.14 (H) 05/06/2020    HGB 14.6 05/06/2020    HCT 42.2 05/06/2020    MCV 87 05/06/2020     05/06/2020       Recent Labs   Lab 05/09/20  0531   MG 1.9         Significant Imaging:  Imaging Results          CT Head Without Contrast (Final result)  Result time 05/04/20 18:10:27    Final result by Calvin Bourgeois MD (05/04/20 18:10:27)                 Impression:      No acute findings.    All CT scans at this facility are performed  using dose modulation techniques as appropriate to performed exam including the following:  automated exposure control; adjustment of mA and/or kV according to the patients size (this includes techniques or standardized protocols for targeted exams where dose is matched to indication/reason for exam: i.e. extremities or head);  iterative reconstruction technique.      Electronically signed by: Calvin Bourgeois  Date:    05/04/2020  Time:    18:10             Narrative:    EXAMINATION:  CT HEAD WITHOUT CONTRAST    CLINICAL HISTORY:  Dizziness;.    TECHNIQUE:  Low dose axial images were obtained through the head.  Coronal and sagittal reformations were also performed. Contrast was not administered.    COMPARISON:  None.    FINDINGS:  Midline structures are intact. Ventricles are of normal size and configuration. Brain parenchyma is normal with normal differentiation of the gray-white matter.    No intracranial hemorrhage,acute infarct, or suspicious intracranial lesion is identified.    Skull base and calvarium are normal. Visualized sinuses and mastoid air cells are clear.                               X-Ray Chest AP Portable (Final result)  Result time 05/04/20 15:26:29    Final result by Kal Arreaga MD (05/04/20 15:26:29)                 Impression:      No acute findings.      Electronically signed by: Kal Arreaga MD  Date:    05/04/2020  Time:    15:26             Narrative:    EXAMINATION:  XR CHEST AP  PORTABLE    CLINICAL HISTORY:  Chest Pain;    TECHNIQUE:  Single frontal view of the chest was performed.    COMPARISON:  None    FINDINGS:  The cardiomediastinal silhouette is normal.    The lungs are clear.  No pleural effusions.    Multilevel spondylosis in the spine.                                  Assessment/Plan:     * Hyponatremia  1.  Hyponatremia:  Possibly due to SIADH, at least in part due to his SSRI meds,  elevated urine osmolality, TSH and Cortisol OK, head CT negative for malignancy, patient received a dose of tolvaptan on 5/7/20,  baseline sodium about 125-130, Na 129 today. Monitor for now. OK to finish banana bag. Will discontinue D5 fluids.     2.  Hypertension: titrate meds as needed.     3.  Stable renal function, serumCr 0.8 mg/dl.     4.  Metabolic acidosis: cont  sodium bicarbonate supplements.           Thank you for your consult. I will follow-up with patient. Please contact us if you have any additional questions.    Cem Mendez MD  Nephrology  [facility]

## 2020-05-09 NOTE — NURSING
Patient awake/alert in chair. MD at bedside. Notified MD of patient refusal to take any meds or drink water. patient is nonverbal at this time. He tracks with eyes and nods head to simple questions. He does not withdraw to painful stimuli to extremities. He followed commands when asked to press on my hands with feet. But will not squeeze my hand with his hands when asked.  He has a fairly consistent twitch to the right hand. Pupils are equal and reactive. I told the patient his sister called and would like to speak and he does not respond to that .

## 2020-05-09 NOTE — ASSESSMENT & PLAN NOTE
Nephrology following  Labs consistent with SIADH  Goal correction is less than or equal to 8 mmol/L  5/8 - improving after tolvaptan; discussed with nephrology, stopped D5; continue monitoring; consider transfer out of ICU in am if stable  5/9 - holding stable at 129 last 8 hours; continue monitoring

## 2020-05-09 NOTE — PT/OT/SLP PROGRESS
Physical Therapy      Patient Name:  Juwan Pozo   MRN:  [id number]    Eval initiated via chart review in Epic and discussion with RN. Patient not seen today secondary to RN Home stated patient too confused and to wait until tomorrow. Will follow-up on next session.    Mike Noguera, PT

## 2020-05-09 NOTE — SIGNIFICANT EVENT
Was notified on patient's change in mentation when compared with yesterday evening.  He was examined at bedside.  Patient was nonverbal however able to follow some commands.  Generalized weakness noted bilateral, hyper reflexia BLE. Pupils reactive bilaterally.  Nursing staff voiced concern over patient's inability to swallow, which is new. He also appeared to be confused. Due to recent change in mentation, stat head CT was ordered which was unremarkable when compared to head CT on 05/04.  Case was discussed with sister who voiced concern with rise in sodium level.    Repeat BMP was ordered.  Na:  128.  Patient was taken off of D5W earlier today.  In addressing sister's concern, rapid correction of sodium could lead to osmotic demyelination syndrome.  Na dropped as low as 114 which was 48 hrs ago. Typically ODS is seen with lower Na <105-110. Although no established definitive treatment of ODS, Na could be lowered to approximately 16 mmol/L above pre treated Na (< approx 130).  Patient currently within that goal.  Will continue to monitor BMPs q.4h.  Plan to discuss with day team on possible need for brain MRI should patient continue to be altered.    He was given low-dose 0.5 mg Ativan for anxiety. Patient became more verbal this a.m. He appeared somewhat confused however was able to answer questions.  Patient reports drinking 2 40 oz beers daily.  Low-dose Librium taper added, banana bag ordered, Ativan PRN.

## 2020-05-09 NOTE — ASSESSMENT & PLAN NOTE
Likely s/t withdrawal syndrome (fluoxetine, benzo, muscle relaxer use at baseline; fluoxetine stopped abruptly s/t hyponatremia); cannot rule out s/t critical illness, severe hyponatremia recovery; ODS unlikely given waxing/waning symptomology and sodium correction remaining less than 8 per day  Cautious muscle relaxer and benzo use prn  Continue close monitoring of sodium correction rate  Normalize routine, encourage daytime wakefulness, and mobilize (consult PT/OT)

## 2020-05-09 NOTE — PROGRESS NOTES
[facility] -   Critical Care Medicine  Progress Note    Patient Name: Juwan Pozo  MRN: [id number]  Admission Date: 5/4/2020  Hospital Length of Stay: 5 days  Code Status: No Order  Attending Provider: Timoteo Guzman MD  Primary Care Provider: Primary Doctor No   Principal Problem: Hyponatremia    Subjective:     HPI:  59 year old male with PMH including depression, anxiety, HTN, and elevated cholesterol  Presented to ED on 5/4 with complaints of substernal chest tightness, malaise, dry mouth, lower extremity numbness  Evaluation revealed elevated BP that improved with IV labetalol; sodium level 121; CT head unremarkable; CXR without infiltrates or acute findings; and COVID19 negative  Admitted to inpt floor for management of hyponatremia  Early on morning of 5/7 he transferred to ICU for close monitoring and potential initiating 3% Saline infusion after failing conservative management.     Hospital/ICU Course:  5/7 - transferred to ICU overnight; awake and alert but minimal interaction with caregivers, no verbalization; given 2nd dose talvaptan per nephrology and noted dramatic increase in urine output  5/8 - More awake and interactive today, OOB to chair with nursing assist; Na trend up overnight, peaked at 128 before initiation of D5 IVF by nephrology; this am Na trend down at 126  5/9 - resting, easily aroused this morning, conversation requires frequent stimuli; complains of malaise and myalgias, non specific along with complaint of cold breakfast and wanting to rest    Review of Systems   Constitutional: Positive for malaise/fatigue.   HENT: Negative for congestion and sinus pain.    Respiratory: Negative for shortness of breath.    Cardiovascular: Negative for chest pain.   Gastrointestinal: Negative for abdominal pain, nausea and vomiting.   Musculoskeletal: Positive for myalgias. Negative for joint pain.   Neurological: Positive for weakness. Negative for tingling, tremors and focal  weakness.   Psychiatric/Behavioral: The patient does not have insomnia.          Objective:     Vital Signs (Most Recent):  Temp: 98.8 °F (37.1 °C) (05/09/20 0830)  Pulse: (!) 112 (05/09/20 0930)  Resp: (!) 22 (05/09/20 0930)  BP: (!) 204/115 (05/09/20 0930)  SpO2: 97 % (05/09/20 0930) Vital Signs (24h Range):  Temp:  [98.2 °F (36.8 °C)-99 °F (37.2 °C)] 98.8 °F (37.1 °C)  Pulse:  [] 112  Resp:  [12-23] 22  SpO2:  [86 %-98 %] 97 %  BP: (144-204)/() 204/115     Weight: 91.6 kg (201 lb 15.1 oz)  Body mass index is 26.64 kg/m².      Intake/Output Summary (Last 24 hours) at 5/9/2020 0946  Last data filed at 5/9/2020 0400  Gross per 24 hour   Intake 500 ml   Output 1560 ml   Net -1060 ml       Physical Exam   Constitutional: He appears well-nourished. No distress.   HENT:   Head: Atraumatic.   Eyes: Pupils are equal, round, and reactive to light. Conjunctivae are normal.   Neck: No JVD present. No tracheal deviation present.   Cardiovascular: Normal rate and regular rhythm.   Pulmonary/Chest: Effort normal and breath sounds normal.   Abdominal: Soft. Bowel sounds are normal. He exhibits no distension.   Musculoskeletal: He exhibits no edema.   Neurological: He is alert. GCS eye subscore is 4. GCS verbal subscore is 4. GCS motor subscore is 6.   Skin: Skin is warm and dry. Capillary refill takes less than 2 seconds.       Vents:       Lines/Drains/Airways     Peripherally Inserted Central Catheter Line            PICC Double Lumen 05/07/20 0110 right basilic 2 days          Drain                 Urethral Catheter 05/06/20 1900 Double-lumen 2 days          Peripheral Intravenous Line                 Peripheral IV - Single Lumen 05/06/20 1813 20 G Anterior;Right Forearm 2 days                Significant Labs:    CBC/Anemia Profile:  Recent Labs   Lab 05/08/20  1131 05/09/20  0531   WBC 15.73* 13.69*   HGB 13.1* 12.7*   HCT 39.5* 37.5*    249   MCV 90 88   RDW 12.0 12.1        Chemistries:  Recent Labs    Lab 05/07/20  1419  05/08/20  0349  05/09/20  0114 05/09/20  0531 05/09/20  0921   *  121*   < > 128*  128*  128*   < > 129* 129* 129*   K 4.2   < > 3.7   < > 3.8 3.7 4.0   CL 90*   < > 95   < > 94* 94* 94*   CO2 21*   < > 24   < > 22* 24 24   BUN 14   < > 13   < > 14 15 16   CREATININE 0.8   < > 0.8   < > 0.8 0.9 0.8   CALCIUM 8.6*   < > 8.7   < > 8.8 8.7 8.9   ALBUMIN 3.5  --   --   --   --   --   --    MG  --   --  2.1  --   --  1.9  --    PHOS 2.8  --   --   --   --   --   --     < > = values in this interval not displayed.       All pertinent labs within the past 24 hours have been reviewed.        ABG  No results for input(s): PH, PO2, PCO2, HCO3, BE in the last 168 hours.  Assessment/Plan:     Neuro  Encephalopathy, metabolic  Likely s/t withdrawal syndrome (fluoxetine, benzo, muscle relaxer use at baseline; fluoxetine stopped abruptly s/t hyponatremia); cannot rule out s/t critical illness, severe hyponatremia recovery; ODS unlikely given waxing/waning symptomology and sodium correction remaining less than 8 per day  Cautious muscle relaxer and benzo use prn  Continue close monitoring of sodium correction rate  Normalize routine, encourage daytime wakefulness, and mobilize (consult PT/OT)    Psychiatric  Anxiety disorder  Alprazolam as needed.  Holding SSRI.    Cardiac/Vascular  Hypertension, essential  Nifedipine, Hydralazine, Verapamil, and Carvedilol.  No ACE/ARB/Diuretic with acute hyponatremia.    Renal/  * Hyponatremia  Nephrology following  Labs consistent with SIADH  Goal correction is less than or equal to 8 mmol/L  5/8 - improving after tolvaptan; discussed with nephrology, stopped D5; continue monitoring; consider transfer out of ICU in am if stable  5/9 - holding stable at 129 last 8 hours; continue monitoring    Hypomagnesemia  Continue oral supplementation    Oncology  Leukocytosis  Uncertain etiology but no signs of infection and trending down.  Continue to  monitor.    Other  Tobacco use  Encourage cessation  Offer resource information if amenable prior to discharge     Critical Care Daily Checklist:    A: Awake: RASS Goal/Actual Goal:    Actual: Good Agitation Sedation Scale (RASS): Drowsy   B: Spontaneous Breathing Trial Performed?     C: SAT & SBT Coordinated?  n/a                      D: Delirium: CAM-ICU Overall CAM-ICU: Positive   E: Early Mobility Performed? Yes   F: Feeding Goal:    Status:     Current Diet Order   Procedures    Diet Adult Regular (IDDSI Level 7)      AS: Analgesia/Sedation prn   T: Thromboembolic Prophylaxis lovenox   H: HOB > 300 Yes   U: Stress Ulcer Prophylaxis (if needed) PPI   G: Glucose Control monitoring   B: Bowel Function Stool Occurrence: 0   I: Indwelling Catheter (Lines & Ordaz) Necessity reviewed   D: De-escalation of Antimicrobials/Pharmacotherapies reviewed    Plan for the day/ETD As above    Family/Goals of Care: Home on discharge   I have discussed case and plan of care in detail with Dr Guzman and Dr Mendez; Status and plan of care were discussed with team on multidisciplinary rounds.  Spouse, Marianna, along with sisters Marcella and Carmen called with update    Critical Care Time: 40 minutes  Critical secondary to Patient has an abrupt change in neurologic status; Critical care was time spent personally by me on the following activities: development of treatment plan with patient or surrogate and bedside caregivers, discussions with consultants, evaluation of patient's response to treatment, examination of patient, ordering and performing treatments and interventions, ordering and review of laboratory studies, ordering and review of radiographic studies, pulse oximetry, re-evaluation of patient's condition. This critical care time did not overlap with that of any other provider or involve time for any procedures.     MANNY Em-BC  Critical Care Medicine  [facility] [place]

## 2020-05-09 NOTE — SUBJECTIVE & OBJECTIVE
Review of Systems   Constitutional: Positive for malaise/fatigue.   HENT: Negative for congestion and sinus pain.    Respiratory: Negative for shortness of breath.    Cardiovascular: Negative for chest pain.   Gastrointestinal: Negative for abdominal pain, nausea and vomiting.   Musculoskeletal: Positive for myalgias. Negative for joint pain.   Neurological: Positive for weakness. Negative for tingling, tremors and focal weakness.   Psychiatric/Behavioral: The patient does not have insomnia.          Objective:     Vital Signs (Most Recent):  Temp: 98.8 °F (37.1 °C) (05/09/20 0830)  Pulse: (!) 112 (05/09/20 0930)  Resp: (!) 22 (05/09/20 0930)  BP: (!) 204/115 (05/09/20 0930)  SpO2: 97 % (05/09/20 0930) Vital Signs (24h Range):  Temp:  [98.2 °F (36.8 °C)-99 °F (37.2 °C)] 98.8 °F (37.1 °C)  Pulse:  [] 112  Resp:  [12-23] 22  SpO2:  [86 %-98 %] 97 %  BP: (144-204)/() 204/115     Weight: 91.6 kg (201 lb 15.1 oz)  Body mass index is 26.64 kg/m².      Intake/Output Summary (Last 24 hours) at 5/9/2020 0946  Last data filed at 5/9/2020 0400  Gross per 24 hour   Intake 500 ml   Output 1560 ml   Net -1060 ml       Physical Exam   Constitutional: He appears well-nourished. No distress.   HENT:   Head: Atraumatic.   Eyes: Pupils are equal, round, and reactive to light. Conjunctivae are normal.   Neck: No JVD present. No tracheal deviation present.   Cardiovascular: Normal rate and regular rhythm.   Pulmonary/Chest: Effort normal and breath sounds normal.   Abdominal: Soft. Bowel sounds are normal. He exhibits no distension.   Musculoskeletal: He exhibits no edema.   Neurological: He is alert. GCS eye subscore is 4. GCS verbal subscore is 4. GCS motor subscore is 6.   Skin: Skin is warm and dry. Capillary refill takes less than 2 seconds.       Vents:       Lines/Drains/Airways     Peripherally Inserted Central Catheter Line            PICC Double Lumen 05/07/20 0110 right basilic 2 days          Drain                  Urethral Catheter 05/06/20 1900 Double-lumen 2 days          Peripheral Intravenous Line                 Peripheral IV - Single Lumen 05/06/20 1813 20 G Anterior;Right Forearm 2 days                Significant Labs:    CBC/Anemia Profile:  Recent Labs   Lab 05/08/20  1131 05/09/20  0531   WBC 15.73* 13.69*   HGB 13.1* 12.7*   HCT 39.5* 37.5*    249   MCV 90 88   RDW 12.0 12.1        Chemistries:  Recent Labs   Lab 05/07/20  1419  05/08/20  0349  05/09/20  0114 05/09/20  0531 05/09/20  0921   *  121*   < > 128*  128*  128*   < > 129* 129* 129*   K 4.2   < > 3.7   < > 3.8 3.7 4.0   CL 90*   < > 95   < > 94* 94* 94*   CO2 21*   < > 24   < > 22* 24 24   BUN 14   < > 13   < > 14 15 16   CREATININE 0.8   < > 0.8   < > 0.8 0.9 0.8   CALCIUM 8.6*   < > 8.7   < > 8.8 8.7 8.9   ALBUMIN 3.5  --   --   --   --   --   --    MG  --   --  2.1  --   --  1.9  --    PHOS 2.8  --   --   --   --   --   --     < > = values in this interval not displayed.       All pertinent labs within the past 24 hours have been reviewed.

## 2020-05-09 NOTE — SUBJECTIVE & OBJECTIVE
Interval History:     5/8/2020: Sodium now at 126. S/p Tolvaptan yesterday.     5/9/20: Sodium level has increased to 129 today.         Review of patient's allergies indicates:   Allergen Reactions    Codeine     Penicillins      Current Facility-Administered Medications   Medication Frequency    acetaminophen tablet 650 mg Q6H PRN    albuterol-ipratropium 2.5 mg-0.5 mg/3 mL nebulizer solution 3 mL Q4H PRN    ALPRAZolam tablet 0.5 mg Nightly PRN    aluminum-magnesium hydroxide-simethicone 200-200-20 mg/5 mL suspension 30 mL Q6H PRN    aspirin chewable tablet 81 mg Daily    busPIRone tablet 5 mg TID    carvediloL tablet 25 mg BID    cyclobenzaprine tablet 10 mg TID PRN    dextrose 5 % infusion Continuous    diphenhydrAMINE capsule 25 mg Q6H PRN    enoxaparin injection 40 mg Daily    guaifenesin 100 mg/5 ml syrup 200 mg Q4H PRN    hydrALAZINE injection 20 mg Q6H PRN    hydrALAZINE tablet 10 mg Q8H    influenza (QUADRIVALENT PF) vaccine 0.5 mL vaccine x 1 dose    labetaloL injection 10 mg Q6H PRN    lorazepam (ATIVAN) injection 2 mg Q4H PRN    magnesium oxide tablet 400 mg BID    nicotine 21 mg/24 hr 1 patch Daily    NIFEdipine 24 hr tablet 30 mg Daily    ondansetron injection 4 mg Q8H PRN    pantoprazole EC tablet 40 mg Daily    simvastatin tablet 40 mg QHS    sodium chloride 0.9% flush 10 mL Q6H    And    sodium chloride 0.9% flush 10 mL PRN    tamsulosin 24 hr capsule 0.4 mg BID    verapamiL CR tablet 180 mg Daily       Objective:     Vital Signs (Most Recent):  Temp: 99 °F (37.2 °C) (05/08/20 1900)  Pulse: 92 (05/09/20 0801)  Resp: 20 (05/09/20 0400)  BP: (!) 172/102 (05/09/20 0801)  SpO2: (!) 94 % (05/09/20 0400)  O2 Device (Oxygen Therapy): room air (05/08/20 2000) Vital Signs (24h Range):  Temp:  [98.2 °F (36.8 °C)-99 °F (37.2 °C)] 99 °F (37.2 °C)  Pulse:  [] 92  Resp:  [16-23] 20  SpO2:  [86 %-98 %] 94 %  BP: (144-199)/() 172/102     Weight: 91.6 kg (201 lb 15.1 oz)  (05/06/20 2300)  Body mass index is 26.64 kg/m².  Body surface area is 2.17 meters squared.    I/O last 3 completed shifts:  In: 2462.5 [P.O.:1000; I.V.:1462.5]  Out: 5235 [Urine:5235]    Physical Exam   Constitutional: He appears well-developed. He is cooperative.   HENT:   Head: Normocephalic.   Nose: No rhinorrhea.   Mouth/Throat: Mucous membranes are normal. No oropharyngeal exudate.   Neck: No thyroid mass present.   Cardiovascular: Normal rate, regular rhythm, S1 normal, S2 normal and intact distal pulses.   Pulmonary/Chest: Effort normal. No respiratory distress. He has no wheezes.   Abdominal: Soft. Bowel sounds are normal. He exhibits no distension. There is no tenderness. No hernia.   Lymphadenopathy:     He has no cervical adenopathy.   Neurological: He is alert.   Responsive.   Skin: Skin is warm.       Significant Labs:  Lab Results   Component Value Date    CREATININE 0.8 05/09/2020    BUN 16 05/09/2020     (L) 05/09/2020    K 4.0 05/09/2020    CL 94 (L) 05/09/2020    CO2 24 05/09/2020     Lab Results   Component Value Date    CALCIUM 8.9 05/09/2020    PHOS 2.8 05/07/2020     Lab Results   Component Value Date    ALBUMIN 3.5 05/07/2020     Lab Results   Component Value Date    WBC 13.14 (H) 05/06/2020    HGB 14.6 05/06/2020    HCT 42.2 05/06/2020    MCV 87 05/06/2020     05/06/2020       Recent Labs   Lab 05/09/20  0531   MG 1.9         Significant Imaging:  Imaging Results          CT Head Without Contrast (Final result)  Result time 05/04/20 18:10:27    Final result by Calvin Bourgeois MD (05/04/20 18:10:27)                 Impression:      No acute findings.    All CT scans at this facility are performed  using dose modulation techniques as appropriate to performed exam including the following:  automated exposure control; adjustment of mA and/or kV according to the patients size (this includes techniques or standardized protocols for targeted exams where dose is matched to  indication/reason for exam: i.e. extremities or head);  iterative reconstruction technique.      Electronically signed by: Calvin Bourgeois  Date:    05/04/2020  Time:    18:10             Narrative:    EXAMINATION:  CT HEAD WITHOUT CONTRAST    CLINICAL HISTORY:  Dizziness;.    TECHNIQUE:  Low dose axial images were obtained through the head.  Coronal and sagittal reformations were also performed. Contrast was not administered.    COMPARISON:  None.    FINDINGS:  Midline structures are intact. Ventricles are of normal size and configuration. Brain parenchyma is normal with normal differentiation of the gray-white matter.    No intracranial hemorrhage,acute infarct, or suspicious intracranial lesion is identified.    Skull base and calvarium are normal. Visualized sinuses and mastoid air cells are clear.                               X-Ray Chest AP Portable (Final result)  Result time 05/04/20 15:26:29    Final result by Kal Arreaga MD (05/04/20 15:26:29)                 Impression:      No acute findings.      Electronically signed by: Kal Arreaga MD  Date:    05/04/2020  Time:    15:26             Narrative:    EXAMINATION:  XR CHEST AP PORTABLE    CLINICAL HISTORY:  Chest Pain;    TECHNIQUE:  Single frontal view of the chest was performed.    COMPARISON:  None    FINDINGS:  The cardiomediastinal silhouette is normal.    The lungs are clear.  No pleural effusions.    Multilevel spondylosis in the spine.

## 2020-05-09 NOTE — ASSESSMENT & PLAN NOTE
1.  Hyponatremia:  Possibly due to SIADH, at least in part due to his SSRI meds,  elevated urine osmolality, TSH and Cortisol OK, head CT negative for malignancy, patient received a dose of tolvaptan on 5/7/20,  baseline sodium about 125-130, Na 129 today. Monitor for now. OK to finish banana bag. Will discontinue D5 fluids.     2.  Hypertension: titrate meds as needed.     3.  Stable renal function, serumCr 0.8 mg/dl.     4.  Metabolic acidosis: cont  sodium bicarbonate supplements.

## 2020-05-10 PROBLEM — F05 DELIRIUM DUE TO MULTIPLE ETIOLOGIES, ACUTE, HYPERACTIVE: Status: ACTIVE | Noted: 2020-05-09

## 2020-05-10 LAB
ANION GAP SERPL CALC-SCNC: 10 MMOL/L (ref 8–16)
ANION GAP SERPL CALC-SCNC: 9 MMOL/L (ref 8–16)
BACTERIA BLD CULT: NORMAL
BACTERIA BLD CULT: NORMAL
BASOPHILS # BLD AUTO: 0.1 K/UL (ref 0–0.2)
BASOPHILS NFR BLD: 0.8 % (ref 0–1.9)
BUN SERPL-MCNC: 15 MG/DL (ref 6–20)
BUN SERPL-MCNC: 18 MG/DL (ref 6–20)
CALCIUM SERPL-MCNC: 8.3 MG/DL (ref 8.7–10.5)
CALCIUM SERPL-MCNC: 8.8 MG/DL (ref 8.7–10.5)
CHLORIDE SERPL-SCNC: 94 MMOL/L (ref 95–110)
CHLORIDE SERPL-SCNC: 94 MMOL/L (ref 95–110)
CO2 SERPL-SCNC: 22 MMOL/L (ref 23–29)
CO2 SERPL-SCNC: 23 MMOL/L (ref 23–29)
CREAT SERPL-MCNC: 0.7 MG/DL (ref 0.5–1.4)
CREAT SERPL-MCNC: 1 MG/DL (ref 0.5–1.4)
DIFFERENTIAL METHOD: ABNORMAL
EOSINOPHIL # BLD AUTO: 0.5 K/UL (ref 0–0.5)
EOSINOPHIL NFR BLD: 4 % (ref 0–8)
ERYTHROCYTE [DISTWIDTH] IN BLOOD BY AUTOMATED COUNT: 12.1 % (ref 11.5–14.5)
EST. GFR  (AFRICAN AMERICAN): >60 ML/MIN/1.73 M^2
EST. GFR  (AFRICAN AMERICAN): >60 ML/MIN/1.73 M^2
EST. GFR  (NON AFRICAN AMERICAN): >60 ML/MIN/1.73 M^2
EST. GFR  (NON AFRICAN AMERICAN): >60 ML/MIN/1.73 M^2
GLUCOSE SERPL-MCNC: 117 MG/DL (ref 70–110)
GLUCOSE SERPL-MCNC: 83 MG/DL (ref 70–110)
HCT VFR BLD AUTO: 34.2 % (ref 40–54)
HGB BLD-MCNC: 11.6 G/DL (ref 14–18)
IMM GRANULOCYTES # BLD AUTO: 0.03 K/UL (ref 0–0.04)
IMM GRANULOCYTES NFR BLD AUTO: 0.3 % (ref 0–0.5)
LYMPHOCYTES # BLD AUTO: 2.5 K/UL (ref 1–4.8)
LYMPHOCYTES NFR BLD: 21 % (ref 18–48)
MAGNESIUM SERPL-MCNC: 1.6 MG/DL (ref 1.6–2.6)
MCH RBC QN AUTO: 30.3 PG (ref 27–31)
MCHC RBC AUTO-ENTMCNC: 33.9 G/DL (ref 32–36)
MCV RBC AUTO: 89 FL (ref 82–98)
MONOCYTES # BLD AUTO: 1.2 K/UL (ref 0.3–1)
MONOCYTES NFR BLD: 9.8 % (ref 4–15)
NEUTROPHILS # BLD AUTO: 7.6 K/UL (ref 1.8–7.7)
NEUTROPHILS NFR BLD: 64.1 % (ref 38–73)
NRBC BLD-RTO: 0 /100 WBC
PLATELET # BLD AUTO: 221 K/UL (ref 150–350)
PMV BLD AUTO: 9.1 FL (ref 9.2–12.9)
POTASSIUM SERPL-SCNC: 3.6 MMOL/L (ref 3.5–5.1)
POTASSIUM SERPL-SCNC: 4.4 MMOL/L (ref 3.5–5.1)
RBC # BLD AUTO: 3.83 M/UL (ref 4.6–6.2)
SODIUM SERPL-SCNC: 125 MMOL/L (ref 136–145)
SODIUM SERPL-SCNC: 126 MMOL/L (ref 136–145)
SODIUM SERPL-SCNC: 127 MMOL/L (ref 136–145)
SODIUM SERPL-SCNC: 127 MMOL/L (ref 136–145)
WBC # BLD AUTO: 11.89 K/UL (ref 3.9–12.7)

## 2020-05-10 PROCEDURE — 63600175 PHARM REV CODE 636 W HCPCS: Performed by: INTERNAL MEDICINE

## 2020-05-10 PROCEDURE — 99291 CRITICAL CARE FIRST HOUR: CPT | Mod: ,,, | Performed by: INTERNAL MEDICINE

## 2020-05-10 PROCEDURE — 25000003 PHARM REV CODE 250: Performed by: INTERNAL MEDICINE

## 2020-05-10 PROCEDURE — 25000003 PHARM REV CODE 250: Performed by: EMERGENCY MEDICINE

## 2020-05-10 PROCEDURE — 84295 ASSAY OF SERUM SODIUM: CPT | Mod: 91

## 2020-05-10 PROCEDURE — 25000003 PHARM REV CODE 250: Performed by: NURSE PRACTITIONER

## 2020-05-10 PROCEDURE — S4991 NICOTINE PATCH NONLEGEND: HCPCS | Performed by: INTERNAL MEDICINE

## 2020-05-10 PROCEDURE — 99291 PR CRITICAL CARE, E/M 30-74 MINUTES: ICD-10-PCS | Mod: ,,, | Performed by: INTERNAL MEDICINE

## 2020-05-10 PROCEDURE — A4216 STERILE WATER/SALINE, 10 ML: HCPCS | Performed by: INTERNAL MEDICINE

## 2020-05-10 PROCEDURE — 84295 ASSAY OF SERUM SODIUM: CPT

## 2020-05-10 PROCEDURE — 63600175 PHARM REV CODE 636 W HCPCS: Performed by: NURSE PRACTITIONER

## 2020-05-10 PROCEDURE — 99291 CRITICAL CARE FIRST HOUR: CPT | Mod: ,,, | Performed by: NURSE PRACTITIONER

## 2020-05-10 PROCEDURE — 85025 COMPLETE CBC W/AUTO DIFF WBC: CPT

## 2020-05-10 PROCEDURE — 83735 ASSAY OF MAGNESIUM: CPT

## 2020-05-10 PROCEDURE — 25000003 PHARM REV CODE 250: Performed by: FAMILY MEDICINE

## 2020-05-10 PROCEDURE — 97110 THERAPEUTIC EXERCISES: CPT

## 2020-05-10 PROCEDURE — 97162 PT EVAL MOD COMPLEX 30 MIN: CPT

## 2020-05-10 PROCEDURE — 80048 BASIC METABOLIC PNL TOTAL CA: CPT

## 2020-05-10 PROCEDURE — 20000000 HC ICU ROOM

## 2020-05-10 PROCEDURE — 99291 PR CRITICAL CARE, E/M 30-74 MINUTES: ICD-10-PCS | Mod: ,,, | Performed by: NURSE PRACTITIONER

## 2020-05-10 RX ORDER — QUETIAPINE FUMARATE 25 MG/1
50 TABLET, FILM COATED ORAL NIGHTLY
Status: DISCONTINUED | OUTPATIENT
Start: 2020-05-10 | End: 2020-05-13 | Stop reason: HOSPADM

## 2020-05-10 RX ORDER — POLYETHYLENE GLYCOL 3350 17 G/17G
17 POWDER, FOR SOLUTION ORAL 2 TIMES DAILY PRN
Status: DISCONTINUED | OUTPATIENT
Start: 2020-05-10 | End: 2020-05-11

## 2020-05-10 RX ORDER — LABETALOL HYDROCHLORIDE 5 MG/ML
10 INJECTION, SOLUTION INTRAVENOUS ONCE
Status: COMPLETED | OUTPATIENT
Start: 2020-05-10 | End: 2020-05-10

## 2020-05-10 RX ORDER — MAGNESIUM SULFATE HEPTAHYDRATE 40 MG/ML
2 INJECTION, SOLUTION INTRAVENOUS ONCE
Status: COMPLETED | OUTPATIENT
Start: 2020-05-10 | End: 2020-05-10

## 2020-05-10 RX ORDER — HYDRALAZINE HYDROCHLORIDE 25 MG/1
25 TABLET, FILM COATED ORAL EVERY 8 HOURS
Status: DISCONTINUED | OUTPATIENT
Start: 2020-05-10 | End: 2020-05-10

## 2020-05-10 RX ORDER — CLONIDINE HYDROCHLORIDE 0.1 MG/1
0.1 TABLET ORAL ONCE
Status: COMPLETED | OUTPATIENT
Start: 2020-05-10 | End: 2020-05-10

## 2020-05-10 RX ORDER — POTASSIUM CHLORIDE 20 MEQ/1
40 TABLET, EXTENDED RELEASE ORAL ONCE
Status: COMPLETED | OUTPATIENT
Start: 2020-05-10 | End: 2020-05-10

## 2020-05-10 RX ORDER — FOLIC ACID 1 MG/1
1 TABLET ORAL DAILY
Status: DISCONTINUED | OUTPATIENT
Start: 2020-05-10 | End: 2020-05-13 | Stop reason: HOSPADM

## 2020-05-10 RX ORDER — ALPRAZOLAM 0.5 MG/1
0.5 TABLET ORAL 3 TIMES DAILY PRN
Status: DISCONTINUED | OUTPATIENT
Start: 2020-05-10 | End: 2020-05-11

## 2020-05-10 RX ORDER — NIFEDIPINE 30 MG/1
60 TABLET, EXTENDED RELEASE ORAL DAILY
Status: DISCONTINUED | OUTPATIENT
Start: 2020-05-10 | End: 2020-05-13 | Stop reason: HOSPADM

## 2020-05-10 RX ORDER — THIAMINE HCL 100 MG
100 TABLET ORAL DAILY
Status: DISCONTINUED | OUTPATIENT
Start: 2020-05-10 | End: 2020-05-13 | Stop reason: HOSPADM

## 2020-05-10 RX ORDER — HYDRALAZINE HYDROCHLORIDE 50 MG/1
50 TABLET, FILM COATED ORAL EVERY 8 HOURS
Status: DISCONTINUED | OUTPATIENT
Start: 2020-05-10 | End: 2020-05-12

## 2020-05-10 RX ADMIN — ENOXAPARIN SODIUM 40 MG: 100 INJECTION SUBCUTANEOUS at 05:05

## 2020-05-10 RX ADMIN — PANTOPRAZOLE SODIUM 40 MG: 40 TABLET, DELAYED RELEASE ORAL at 08:05

## 2020-05-10 RX ADMIN — BUSPIRONE HYDROCHLORIDE 5 MG: 5 TABLET ORAL at 03:05

## 2020-05-10 RX ADMIN — Medication 400 MG: at 08:05

## 2020-05-10 RX ADMIN — NIFEDIPINE 60 MG: 30 TABLET, FILM COATED, EXTENDED RELEASE ORAL at 08:05

## 2020-05-10 RX ADMIN — ALPRAZOLAM 0.5 MG: 0.5 TABLET ORAL at 06:05

## 2020-05-10 RX ADMIN — FOLIC ACID 1 MG: 1 TABLET ORAL at 09:05

## 2020-05-10 RX ADMIN — HYDRALAZINE HYDROCHLORIDE 20 MG: 20 INJECTION INTRAMUSCULAR; INTRAVENOUS at 05:05

## 2020-05-10 RX ADMIN — CARVEDILOL 25 MG: 12.5 TABLET, FILM COATED ORAL at 08:05

## 2020-05-10 RX ADMIN — HYDRALAZINE HYDROCHLORIDE 25 MG: 25 TABLET, FILM COATED ORAL at 07:05

## 2020-05-10 RX ADMIN — BUSPIRONE HYDROCHLORIDE 5 MG: 5 TABLET ORAL at 08:05

## 2020-05-10 RX ADMIN — POLYETHYLENE GLYCOL 3350 17 G: 17 POWDER, FOR SOLUTION ORAL at 04:05

## 2020-05-10 RX ADMIN — SIMVASTATIN 40 MG: 20 TABLET, FILM COATED ORAL at 08:05

## 2020-05-10 RX ADMIN — HYDRALAZINE HYDROCHLORIDE 50 MG: 50 TABLET, FILM COATED ORAL at 03:05

## 2020-05-10 RX ADMIN — TAMSULOSIN HYDROCHLORIDE 0.4 MG: 0.4 CAPSULE ORAL at 08:05

## 2020-05-10 RX ADMIN — HYDRALAZINE HYDROCHLORIDE 50 MG: 50 TABLET, FILM COATED ORAL at 09:05

## 2020-05-10 RX ADMIN — Medication 100 MG: at 09:05

## 2020-05-10 RX ADMIN — POTASSIUM CHLORIDE 40 MEQ: 20 TABLET, EXTENDED RELEASE ORAL at 07:05

## 2020-05-10 RX ADMIN — ASPIRIN 81 MG 81 MG: 81 TABLET ORAL at 08:05

## 2020-05-10 RX ADMIN — Medication 10 ML: at 05:05

## 2020-05-10 RX ADMIN — MAGNESIUM SULFATE 2 G: 2 INJECTION INTRAVENOUS at 07:05

## 2020-05-10 RX ADMIN — LABETALOL HYDROCHLORIDE 10 MG: 5 INJECTION, SOLUTION INTRAVENOUS at 10:05

## 2020-05-10 RX ADMIN — Medication 10 ML: at 11:05

## 2020-05-10 RX ADMIN — LABETALOL HYDROCHLORIDE 10 MG: 5 INJECTION, SOLUTION INTRAVENOUS at 03:05

## 2020-05-10 RX ADMIN — QUETIAPINE FUMARATE 50 MG: 25 TABLET ORAL at 08:05

## 2020-05-10 RX ADMIN — LORAZEPAM 0.5 MG: 2 INJECTION INTRAMUSCULAR; INTRAVENOUS at 08:05

## 2020-05-10 RX ADMIN — THERA TABS 1 TABLET: TAB at 09:05

## 2020-05-10 RX ADMIN — CLONIDINE HYDROCHLORIDE 0.1 MG: 0.1 TABLET ORAL at 06:05

## 2020-05-10 RX ADMIN — LABETALOL HYDROCHLORIDE 10 MG: 5 INJECTION, SOLUTION INTRAVENOUS at 06:05

## 2020-05-10 RX ADMIN — Medication 1 PATCH: at 08:05

## 2020-05-10 RX ADMIN — VERAPAMIL HYDROCHLORIDE 180 MG: 180 TABLET, FILM COATED, EXTENDED RELEASE ORAL at 08:05

## 2020-05-10 RX ADMIN — LORAZEPAM 0.5 MG: 2 INJECTION INTRAMUSCULAR; INTRAVENOUS at 01:05

## 2020-05-10 NOTE — PT/OT/SLP PROGRESS
Occupational Therapy      Patient Name:  Juwan Pozo   MRN:  [id number]    Patient not seen today secondary to Other (Comment). OCCUPATIONAL THERAPIST WAS INFORMED BY NURSE ESPINOZA THAT PATIENT RECEIVED ATIVAN CAUSING PATIENT TO HAVE DECREASED ALERTNESS WITH INABILITY TO PARTICIPATE IN OT EVAL THIS DATE.  Will follow-up.    Susan Wang OT  5/10/2020

## 2020-05-10 NOTE — SUBJECTIVE & OBJECTIVE
Interval History:     5/8/2020: Sodium now at 126. S/p Tolvaptan yesterday.     5/9/2020: Sodium level has increased to 129 today.     5/10/2020: Na has declined to 125.         Review of patient's allergies indicates:   Allergen Reactions    Codeine     Penicillins      Current Facility-Administered Medications   Medication Frequency    acetaminophen tablet 650 mg Q6H PRN    albuterol-ipratropium 2.5 mg-0.5 mg/3 mL nebulizer solution 3 mL Q4H PRN    ALPRAZolam tablet 0.5 mg TID PRN    aluminum-magnesium hydroxide-simethicone 200-200-20 mg/5 mL suspension 30 mL Q6H PRN    aspirin chewable tablet 81 mg Daily    busPIRone tablet 5 mg TID    carvediloL tablet 25 mg BID    cyclobenzaprine tablet 10 mg TID PRN    diphenhydrAMINE capsule 25 mg Q6H PRN    enoxaparin injection 40 mg Daily    folic acid tablet 1 mg Daily    guaifenesin 100 mg/5 ml syrup 200 mg Q4H PRN    hydrALAZINE injection 20 mg Q6H PRN    hydrALAZINE tablet 50 mg Q8H    influenza (QUADRIVALENT PF) vaccine 0.5 mL vaccine x 1 dose    labetaloL injection 10 mg Q6H PRN    magnesium oxide tablet 400 mg BID    multivitamin tablet Daily    nicotine 21 mg/24 hr 1 patch Daily    NIFEdipine 24 hr tablet 60 mg Daily    ondansetron injection 4 mg Q8H PRN    pantoprazole EC tablet 40 mg Daily    polyethylene glycol packet 17 g BID PRN    QUEtiapine tablet 50 mg QHS    simvastatin tablet 40 mg QHS    sodium chloride 0.9% flush 10 mL Q6H    And    sodium chloride 0.9% flush 10 mL PRN    tamsulosin 24 hr capsule 0.4 mg BID    thiamine tablet 100 mg Daily    verapamiL CR tablet 180 mg Daily       Objective:     Vital Signs (Most Recent):  Temp: 98.2 °F (36.8 °C) (05/10/20 0000)  Pulse: 97 (05/10/20 0817)  Resp: 18 (05/10/20 0700)  BP: (!) 168/104 (05/10/20 0817)  SpO2: 98 % (05/10/20 0700)  O2 Device (Oxygen Therapy): room air (05/09/20 1900) Vital Signs (24h Range):  Temp:  [98.2 °F (36.8 °C)-98.4 °F (36.9 °C)] 98.2 °F (36.8 °C)  Pulse:   [] 97  Resp:  [13-23] 18  SpO2:  [96 %-100 %] 98 %  BP: (117-222)/() 168/104     Weight: 91.6 kg (201 lb 15.1 oz) (05/06/20 2300)  Body mass index is 26.64 kg/m².  Body surface area is 2.17 meters squared.    I/O last 3 completed shifts:  In: 1200 [P.O.:440; I.V.:760]  Out: 3370 [Urine:3370]    Physical Exam   Constitutional: He appears well-developed. He is cooperative.   HENT:   Head: Normocephalic.   Nose: No rhinorrhea.   Mouth/Throat: Mucous membranes are normal. No oropharyngeal exudate.   Neck: No thyroid mass present.   Cardiovascular: Normal rate, regular rhythm, S1 normal, S2 normal and intact distal pulses.   Pulmonary/Chest: Effort normal. No respiratory distress. He has no wheezes.   Abdominal: Soft. Bowel sounds are normal. He exhibits no distension. There is no tenderness. No hernia.   Lymphadenopathy:     He has no cervical adenopathy.   Neurological: He is alert.   Responsive.   Skin: Skin is warm.       Significant Labs:  Lab Results   Component Value Date    CREATININE 0.7 05/10/2020    BUN 15 05/10/2020     (L) 05/10/2020    K 3.6 05/10/2020    CL 94 (L) 05/10/2020    CO2 22 (L) 05/10/2020     Lab Results   Component Value Date    CALCIUM 8.3 (L) 05/10/2020    PHOS 2.8 05/07/2020     Lab Results   Component Value Date    ALBUMIN 3.5 05/07/2020     Lab Results   Component Value Date    WBC 13.14 (H) 05/06/2020    HGB 14.6 05/06/2020    HCT 42.2 05/06/2020    MCV 87 05/06/2020     05/06/2020       Recent Labs   Lab 05/10/20  0256   MG 1.6         Significant Imaging:  Imaging Results          CT Head Without Contrast (Final result)  Result time 05/04/20 18:10:27    Final result by Calvin Bourgeois MD (05/04/20 18:10:27)                 Impression:      No acute findings.    All CT scans at this facility are performed  using dose modulation techniques as appropriate to performed exam including the following:  automated exposure control; adjustment of mA and/or kV  according to the patients size (this includes techniques or standardized protocols for targeted exams where dose is matched to indication/reason for exam: i.e. extremities or head);  iterative reconstruction technique.      Electronically signed by: Calvin Bourgeois  Date:    05/04/2020  Time:    18:10             Narrative:    EXAMINATION:  CT HEAD WITHOUT CONTRAST    CLINICAL HISTORY:  Dizziness;.    TECHNIQUE:  Low dose axial images were obtained through the head.  Coronal and sagittal reformations were also performed. Contrast was not administered.    COMPARISON:  None.    FINDINGS:  Midline structures are intact. Ventricles are of normal size and configuration. Brain parenchyma is normal with normal differentiation of the gray-white matter.    No intracranial hemorrhage,acute infarct, or suspicious intracranial lesion is identified.    Skull base and calvarium are normal. Visualized sinuses and mastoid air cells are clear.                               X-Ray Chest AP Portable (Final result)  Result time 05/04/20 15:26:29    Final result by Kal Arreaga MD (05/04/20 15:26:29)                 Impression:      No acute findings.      Electronically signed by: Kal Arreaga MD  Date:    05/04/2020  Time:    15:26             Narrative:    EXAMINATION:  XR CHEST AP PORTABLE    CLINICAL HISTORY:  Chest Pain;    TECHNIQUE:  Single frontal view of the chest was performed.    COMPARISON:  None    FINDINGS:  The cardiomediastinal silhouette is normal.    The lungs are clear.  No pleural effusions.    Multilevel spondylosis in the spine.

## 2020-05-10 NOTE — PROGRESS NOTES
[facility] -   Critical Care Medicine  Progress Note    Patient Name: Juwan Pozo  MRN: [id number]  Admission Date: 5/4/2020  Hospital Length of Stay: 6 days  Code Status: Full Code  Attending Provider: Timoteo Guzman MD  Primary Care Provider: Primary Doctor No   Principal Problem: Hyponatremia    Subjective:     HPI:  59 year old male with PMH including depression, anxiety, HTN, and elevated cholesterol  Presented to ED on 5/4 with complaints of substernal chest tightness, malaise, dry mouth, lower extremity numbness  Evaluation revealed elevated BP that improved with IV labetalol; sodium level 121; CT head unremarkable; CXR without infiltrates or acute findings; and COVID19 negative  Admitted to inpt floor for management of hyponatremia  Early on morning of 5/7 he transferred to ICU for close monitoring and potential initiating 3% Saline infusion after failing conservative management.     Hospital/ICU Course:  5/7 - transferred to ICU overnight; awake and alert but minimal interaction with caregivers, no verbalization; given 2nd dose talvaptan per nephrology and noted dramatic increase in urine output  5/8 - More awake and interactive today, OOB to chair with nursing assist; Na trend up overnight, peaked at 128 before initiation of D5 IVF by nephrology; this am Na trend down at 126  5/9 - resting, easily aroused this morning, conversation requires frequent stimuli; complains of malaise and myalgias, non specific along with complaint of cold breakfast and wanting to rest  5/10 - sodium peaked at 129 yesterday and slow trend down today at 125; continues to require prn meds for HTN; reported not sleeping at night, confused and hallucinating this morning     Review of Systems   Constitutional: Positive for malaise/fatigue.   HENT: Negative for congestion and sinus pain.    Respiratory: Negative for shortness of breath.    Cardiovascular: Negative for chest pain.   Gastrointestinal: Negative for  abdominal pain, nausea and vomiting.   Musculoskeletal: Positive for myalgias. Negative for joint pain.   Neurological: Positive for weakness. Negative for tingling, tremors and focal weakness.   Psychiatric/Behavioral: The patient does not have insomnia.          Objective:     Vital Signs (Most Recent):  Temp: 98.2 °F (36.8 °C) (05/10/20 0000)  Pulse: 97 (05/10/20 0817)  Resp: 18 (05/10/20 0700)  BP: (!) 168/104 (05/10/20 0817)  SpO2: 98 % (05/10/20 0700) Vital Signs (24h Range):  Temp:  [98.2 °F (36.8 °C)-98.4 °F (36.9 °C)] 98.2 °F (36.8 °C)  Pulse:  [] 97  Resp:  [13-23] 18  SpO2:  [96 %-100 %] 98 %  BP: (117-222)/() 168/104     Weight: 91.6 kg (201 lb 15.1 oz)  Body mass index is 26.64 kg/m².      Intake/Output Summary (Last 24 hours) at 5/10/2020 0853  Last data filed at 5/10/2020 0600  Gross per 24 hour   Intake 1000 ml   Output 2245 ml   Net -1245 ml       Physical Exam   Constitutional: He appears well-nourished. No distress.   HENT:   Head: Atraumatic.   Eyes: Pupils are equal, round, and reactive to light. Conjunctivae are normal.   Neck: No JVD present. No tracheal deviation present.   Cardiovascular: Normal rate and regular rhythm.   Pulmonary/Chest: Effort normal and breath sounds normal.   Abdominal: Soft. Bowel sounds are normal. He exhibits no distension.   Musculoskeletal: He exhibits no edema.   Neurological: He is alert. GCS eye subscore is 4. GCS verbal subscore is 4. GCS motor subscore is 6.   Skin: Skin is warm and dry. Capillary refill takes less than 2 seconds. No cyanosis.   Residual erythema and small induration to left axilla (treated outpatient for abscess)denies tenderness/pain   Psychiatric: His speech is normal. His mood appears anxious. He is hyperactive and actively hallucinating. He expresses impulsivity.       Vents:  Oxygen Concentration (%): 21 (05/09/20 2100)    Lines/Drains/Airways     Peripherally Inserted Central Catheter Line            PICC Double Lumen  05/07/20 0110 right basilic 3 days          Drain                 Urethral Catheter 05/06/20 1900 Double-lumen 3 days          Peripheral Intravenous Line                 Peripheral IV - Single Lumen 05/06/20 1813 20 G Anterior;Right Forearm 3 days                Significant Labs:    CBC/Anemia Profile:  Recent Labs   Lab 05/08/20  1131 05/09/20  0531 05/10/20  0256   WBC 15.73* 13.69* 11.89   HGB 13.1* 12.7* 11.6*   HCT 39.5* 37.5* 34.2*    249 221   MCV 90 88 89   RDW 12.0 12.1 12.1        Chemistries:  Recent Labs   Lab 05/09/20  0531 05/09/20  0921 05/09/20  1629 05/09/20  2324 05/10/20  0256 05/10/20  0640   * 129* 129* 127* 126* 125*   K 3.7 4.0 4.2  --  3.6  --    CL 94* 94* 96  --  94*  --    CO2 24 24 24  --  22*  --    BUN 15 16 16  --  15  --    CREATININE 0.9 0.8 0.9  --  0.7  --    CALCIUM 8.7 8.9 8.9  --  8.3*  --    MG 1.9  --   --   --  1.6  --        All pertinent labs within the past 24 hours have been reviewed.        ABG  No results for input(s): PH, PO2, PCO2, HCO3, BE in the last 168 hours.  Assessment/Plan:     Psychiatric  Delirium due to multiple etiologies, acute, hyperactive  Likely s/t withdrawal syndrome (fluoxetine, benzo, muscle relaxer use at baseline; fluoxetine stopped abruptly s/t hyponatremia) + critical illness/ICU psychosis  ODS unlikely given waxing/waning symptomology and sodium correction remaining less than 8 per day  Cautious muscle relaxer and escalation of benzo (home xanax 1mg TID prn, will escalate inpt to 0.5mg TID prn)  Continue close monitoring of sodium correction rate  Normalize routine, encourage daytime wakefulness, and mobilize (consult PT/OT)  HS seroquel to aid sleep/wake cycle  Oral thiamine, folate, and MVI supplementation    Anxiety disorder  Alprazolam as needed.  Holding SSRI.    Cardiac/Vascular  Hypertension, essential  Nifedipine, Hydralazine, Verapamil, and Carvedilol.  No ACE/ARB/Diuretic with acute hyponatremia.  5/10 - escalate  nifedipine and hydralazine; continue verapamil and coreg dosing; continue ICU hemodynamic monitoring    Renal/  * Hyponatremia  Nephrology following  Labs consistent with SIADH  Goal correction is less than or equal to 8 mmol/L  5/8 - improving after tolvaptan; discussed with nephrology, stopped D5; continue monitoring; consider transfer out of ICU in am if stable  5/9 - holding stable at 129 last 8 hours; continue monitoring  5/10 - continue monitoring    Hypomagnesemia  Continue oral supplementation; give one time IV supplement today    Oncology  Leukocytosis  Resolved; no evidence of active infection; Continue to monitor.    Other  Tobacco use  NRT  Encourage cessation  Offer resource information if amenable prior to discharge     Critical Care Daily Checklist:    A: Awake: RASS Goal/Actual Goal:    Actual: Good Agitation Sedation Scale (RASS): Alert and calm   B: Spontaneous Breathing Trial Performed?     C: SAT & SBT Coordinated?  n/a                      D: Delirium: CAM-ICU Overall CAM-ICU: Negative   E: Early Mobility Performed? Yes   F: Feeding Goal:    Status:     Current Diet Order   Procedures    Diet Adult Regular (IDDSI Level 7)      AS: Analgesia/Sedation prn   T: Thromboembolic Prophylaxis lovenox   H: HOB > 300 Yes   U: Stress Ulcer Prophylaxis (if needed) PPI   G: Glucose Control monitoring   B: Bowel Function Stool Occurrence: 0   I: Indwelling Catheter (Lines & Ordaz) Necessity reviewed   D: De-escalation of Antimicrobials/Pharmacotherapies reviewed    Plan for the day/ETD As above    Family/Goals of Care: Home on discharge   I have discussed case and plan of care in detail with Dr Guzman and Dr Mendez; Status and plan of care were discussed with team on multidisciplinary rounds.  Spouse, Marianna, along with sisters Marcella and Carmen called with update    Critical Care Time: 50 minutes  Critical secondary to Patient has an abrupt change in neurologic status; Critical care was time spent  personally by me on the following activities: development of treatment plan with patient or surrogate and bedside caregivers, discussions with consultants, evaluation of patient's response to treatment, examination of patient, ordering and performing treatments and interventions, ordering and review of laboratory studies, ordering and review of radiographic studies, pulse oximetry, re-evaluation of patient's condition. This critical care time did not overlap with that of any other provider or involve time for any procedures.     MANNY Em-BC  Critical Care Medicine  [facility] [place]

## 2020-05-10 NOTE — ASSESSMENT & PLAN NOTE
Nifedipine, Hydralazine, Verapamil, and Carvedilol.  No ACE/ARB/Diuretic with acute hyponatremia.  5/10 - escalate nifedipine and hydralazine; continue verapamil and coreg dosing; continue ICU hemodynamic monitoring

## 2020-05-10 NOTE — ASSESSMENT & PLAN NOTE
1.  Hyponatremia:  Possibly due to SIADH, at least in part due to his SSRI meds,  elevated urine osmolality, TSH and Cortisol OK, head CT negative for malignancy, patient received a dose of tolvaptan on 5/7/20,  baseline sodium about 125-130, Na 125 today. Monitor for now. Emphasize fluid restrictions to 1000 ml/day. Avoid IV fluids.     2.  Hypertension: titrate meds as needed.     3.  Stable renal function, serumCr 0.7 mg/dl.     4.  Metabolic acidosis: cont  sodium bicarbonate supplements.

## 2020-05-10 NOTE — PLAN OF CARE
The patient was very confused with hallucinations overnight and into the morning hours of my shift. His medications were adjusted and he was able to take a four hour nap that upon awakening definitely seemed to help his mental condition. He was able to hold coherent conversations with his family on the phone and realize he was confused earlier. The blood pressure has been more stable with the adjustment to those medications also. His electrolytes are being managed as well. With a good nights sleep hopefully he will be able downgrade to the medical bruce tomorrow.

## 2020-05-10 NOTE — PROGRESS NOTES
[facility] -   Nephrology  Progress Note    Patient Name: Juwan Pozo  MRN: [id number]  Admission Date: 5/4/2020  Hospital Length of Stay: 6 days  Attending Provider: Timoteo Guzman MD   Primary Care Physician: Primary Doctor No  Principal Problem:Hyponatremia    Subjective:     HPI: Juwan Pozo is a 59-year-old  man with history of hypertension, anxiety, PTSD, Major depression, on medications including Prozac, patient was admitted to the hospital yesterday for generalized weakness, his serum sodium on presentation was 121, initially thought that he might be dehydrated hydration was started on IV fluids, his serum sodium decreased to 117, we were consulted  because of worsening hyponatremia, renal function is stable with serum creatinine 0.8 mg/dL,    Interval History:     5/8/2020: Sodium now at 126. S/p Tolvaptan yesterday.     5/9/2020: Sodium level has increased to 129 today.     5/10/2020: Na has declined to 125.         Review of patient's allergies indicates:   Allergen Reactions    Codeine     Penicillins      Current Facility-Administered Medications   Medication Frequency    acetaminophen tablet 650 mg Q6H PRN    albuterol-ipratropium 2.5 mg-0.5 mg/3 mL nebulizer solution 3 mL Q4H PRN    ALPRAZolam tablet 0.5 mg TID PRN    aluminum-magnesium hydroxide-simethicone 200-200-20 mg/5 mL suspension 30 mL Q6H PRN    aspirin chewable tablet 81 mg Daily    busPIRone tablet 5 mg TID    carvediloL tablet 25 mg BID    cyclobenzaprine tablet 10 mg TID PRN    diphenhydrAMINE capsule 25 mg Q6H PRN    enoxaparin injection 40 mg Daily    folic acid tablet 1 mg Daily    guaifenesin 100 mg/5 ml syrup 200 mg Q4H PRN    hydrALAZINE injection 20 mg Q6H PRN    hydrALAZINE tablet 50 mg Q8H    influenza (QUADRIVALENT PF) vaccine 0.5 mL vaccine x 1 dose    labetaloL injection 10 mg Q6H PRN    magnesium oxide tablet 400 mg BID    multivitamin tablet Daily    nicotine 21 mg/24 hr 1  patch Daily    NIFEdipine 24 hr tablet 60 mg Daily    ondansetron injection 4 mg Q8H PRN    pantoprazole EC tablet 40 mg Daily    polyethylene glycol packet 17 g BID PRN    QUEtiapine tablet 50 mg QHS    simvastatin tablet 40 mg QHS    sodium chloride 0.9% flush 10 mL Q6H    And    sodium chloride 0.9% flush 10 mL PRN    tamsulosin 24 hr capsule 0.4 mg BID    thiamine tablet 100 mg Daily    verapamiL CR tablet 180 mg Daily       Objective:     Vital Signs (Most Recent):  Temp: 98.2 °F (36.8 °C) (05/10/20 0000)  Pulse: 97 (05/10/20 0817)  Resp: 18 (05/10/20 0700)  BP: (!) 168/104 (05/10/20 0817)  SpO2: 98 % (05/10/20 0700)  O2 Device (Oxygen Therapy): room air (05/09/20 1900) Vital Signs (24h Range):  Temp:  [98.2 °F (36.8 °C)-98.4 °F (36.9 °C)] 98.2 °F (36.8 °C)  Pulse:  [] 97  Resp:  [13-23] 18  SpO2:  [96 %-100 %] 98 %  BP: (117-222)/() 168/104     Weight: 91.6 kg (201 lb 15.1 oz) (05/06/20 2300)  Body mass index is 26.64 kg/m².  Body surface area is 2.17 meters squared.    I/O last 3 completed shifts:  In: 1200 [P.O.:440; I.V.:760]  Out: 3370 [Urine:3370]    Physical Exam   Constitutional: He appears well-developed. He is cooperative.   HENT:   Head: Normocephalic.   Nose: No rhinorrhea.   Mouth/Throat: Mucous membranes are normal. No oropharyngeal exudate.   Neck: No thyroid mass present.   Cardiovascular: Normal rate, regular rhythm, S1 normal, S2 normal and intact distal pulses.   Pulmonary/Chest: Effort normal. No respiratory distress. He has no wheezes.   Abdominal: Soft. Bowel sounds are normal. He exhibits no distension. There is no tenderness. No hernia.   Lymphadenopathy:     He has no cervical adenopathy.   Neurological: He is alert.   Responsive.   Skin: Skin is warm.       Significant Labs:  Lab Results   Component Value Date    CREATININE 0.7 05/10/2020    BUN 15 05/10/2020     (L) 05/10/2020    K 3.6 05/10/2020    CL 94 (L) 05/10/2020    CO2 22 (L) 05/10/2020     Lab  Results   Component Value Date    CALCIUM 8.3 (L) 05/10/2020    PHOS 2.8 05/07/2020     Lab Results   Component Value Date    ALBUMIN 3.5 05/07/2020     Lab Results   Component Value Date    WBC 13.14 (H) 05/06/2020    HGB 14.6 05/06/2020    HCT 42.2 05/06/2020    MCV 87 05/06/2020     05/06/2020       Recent Labs   Lab 05/10/20  0256   MG 1.6         Significant Imaging:  Imaging Results          CT Head Without Contrast (Final result)  Result time 05/04/20 18:10:27    Final result by Calvin Bourgeois MD (05/04/20 18:10:27)                 Impression:      No acute findings.    All CT scans at this facility are performed  using dose modulation techniques as appropriate to performed exam including the following:  automated exposure control; adjustment of mA and/or kV according to the patients size (this includes techniques or standardized protocols for targeted exams where dose is matched to indication/reason for exam: i.e. extremities or head);  iterative reconstruction technique.      Electronically signed by: Calvin Bourgeois  Date:    05/04/2020  Time:    18:10             Narrative:    EXAMINATION:  CT HEAD WITHOUT CONTRAST    CLINICAL HISTORY:  Dizziness;.    TECHNIQUE:  Low dose axial images were obtained through the head.  Coronal and sagittal reformations were also performed. Contrast was not administered.    COMPARISON:  None.    FINDINGS:  Midline structures are intact. Ventricles are of normal size and configuration. Brain parenchyma is normal with normal differentiation of the gray-white matter.    No intracranial hemorrhage,acute infarct, or suspicious intracranial lesion is identified.    Skull base and calvarium are normal. Visualized sinuses and mastoid air cells are clear.                               X-Ray Chest AP Portable (Final result)  Result time 05/04/20 15:26:29    Final result by Kal Arreaga MD (05/04/20 15:26:29)                 Impression:      No acute  findings.      Electronically signed by: Kal Arreaga MD  Date:    05/04/2020  Time:    15:26             Narrative:    EXAMINATION:  XR CHEST AP PORTABLE    CLINICAL HISTORY:  Chest Pain;    TECHNIQUE:  Single frontal view of the chest was performed.    COMPARISON:  None    FINDINGS:  The cardiomediastinal silhouette is normal.    The lungs are clear.  No pleural effusions.    Multilevel spondylosis in the spine.                                  Assessment/Plan:     * Hyponatremia  1.  Hyponatremia:  Possibly due to SIADH, at least in part due to his SSRI meds,  elevated urine osmolality, TSH and Cortisol OK, head CT negative for malignancy, patient received a dose of tolvaptan on 5/7/20,  baseline sodium about 125-130, Na 125 today. Monitor for now. Emphasize fluid restrictions to 1000 ml/day. Avoid IV fluids.     2.  Hypertension: titrate meds as needed.     3.  Stable renal function, serumCr 0.7 mg/dl.     4.  Metabolic acidosis: cont  sodium bicarbonate supplements.       Total ICU time: 35 minutes. Chart review and case discussion with ICU team required > 50% of time.        Thank you for your consult. I will follow-up with patient. Please contact us if you have any additional questions.    Cem Mendez MD  Nephrology  [facility]

## 2020-05-10 NOTE — SIGNIFICANT EVENT
Patient continued to be hypertensive overnight.  Noted Procardia was added during the day.  Procardia was increased to 60 mg.  BP was difficult to control with p.r.n. hydralazine and labetalol.  This a.m. patient's BP was ranging from 190-200 SBP.  Extra dose of 10 mg labetalol IV was given along with 0.1 mg clonidine p.o. continue to monitor BP throughout the day.  Titrate p.o. medications as needed.

## 2020-05-10 NOTE — SUBJECTIVE & OBJECTIVE
Review of Systems   Constitutional: Positive for malaise/fatigue.   HENT: Negative for congestion and sinus pain.    Respiratory: Negative for shortness of breath.    Cardiovascular: Negative for chest pain.   Gastrointestinal: Negative for abdominal pain, nausea and vomiting.   Musculoskeletal: Positive for myalgias. Negative for joint pain.   Neurological: Positive for weakness. Negative for tingling, tremors and focal weakness.   Psychiatric/Behavioral: The patient does not have insomnia.          Objective:     Vital Signs (Most Recent):  Temp: 98.2 °F (36.8 °C) (05/10/20 0000)  Pulse: 97 (05/10/20 0817)  Resp: 18 (05/10/20 0700)  BP: (!) 168/104 (05/10/20 0817)  SpO2: 98 % (05/10/20 0700) Vital Signs (24h Range):  Temp:  [98.2 °F (36.8 °C)-98.4 °F (36.9 °C)] 98.2 °F (36.8 °C)  Pulse:  [] 97  Resp:  [13-23] 18  SpO2:  [96 %-100 %] 98 %  BP: (117-222)/() 168/104     Weight: 91.6 kg (201 lb 15.1 oz)  Body mass index is 26.64 kg/m².      Intake/Output Summary (Last 24 hours) at 5/10/2020 0853  Last data filed at 5/10/2020 0600  Gross per 24 hour   Intake 1000 ml   Output 2245 ml   Net -1245 ml       Physical Exam   Constitutional: He appears well-nourished. No distress.   HENT:   Head: Atraumatic.   Eyes: Pupils are equal, round, and reactive to light. Conjunctivae are normal.   Neck: No JVD present. No tracheal deviation present.   Cardiovascular: Normal rate and regular rhythm.   Pulmonary/Chest: Effort normal and breath sounds normal.   Abdominal: Soft. Bowel sounds are normal. He exhibits no distension.   Musculoskeletal: He exhibits no edema.   Neurological: He is alert. GCS eye subscore is 4. GCS verbal subscore is 4. GCS motor subscore is 6.   Skin: Skin is warm and dry. Capillary refill takes less than 2 seconds. No cyanosis.   Residual erythema and small induration to left axilla (treated outpatient for abscess)denies tenderness/pain   Psychiatric: His speech is normal. His mood appears  anxious. He is hyperactive and actively hallucinating. He expresses impulsivity.       Vents:  Oxygen Concentration (%): 21 (05/09/20 2100)    Lines/Drains/Airways     Peripherally Inserted Central Catheter Line            PICC Double Lumen 05/07/20 0110 right basilic 3 days          Drain                 Urethral Catheter 05/06/20 1900 Double-lumen 3 days          Peripheral Intravenous Line                 Peripheral IV - Single Lumen 05/06/20 1813 20 G Anterior;Right Forearm 3 days                Significant Labs:    CBC/Anemia Profile:  Recent Labs   Lab 05/08/20  1131 05/09/20  0531 05/10/20  0256   WBC 15.73* 13.69* 11.89   HGB 13.1* 12.7* 11.6*   HCT 39.5* 37.5* 34.2*    249 221   MCV 90 88 89   RDW 12.0 12.1 12.1        Chemistries:  Recent Labs   Lab 05/09/20  0531 05/09/20  0921 05/09/20  1629 05/09/20  2324 05/10/20  0256 05/10/20  0640   * 129* 129* 127* 126* 125*   K 3.7 4.0 4.2  --  3.6  --    CL 94* 94* 96  --  94*  --    CO2 24 24 24  --  22*  --    BUN 15 16 16  --  15  --    CREATININE 0.9 0.8 0.9  --  0.7  --    CALCIUM 8.7 8.9 8.9  --  8.3*  --    MG 1.9  --   --   --  1.6  --        All pertinent labs within the past 24 hours have been reviewed.

## 2020-05-10 NOTE — ASSESSMENT & PLAN NOTE
Nephrology following  Labs consistent with SIADH  Goal correction is less than or equal to 8 mmol/L  5/8 - improving after tolvaptan; discussed with nephrology, stopped D5; continue monitoring; consider transfer out of ICU in am if stable  5/9 - holding stable at 129 last 8 hours; continue monitoring  5/10 - continue monitoring

## 2020-05-11 PROBLEM — D72.829 LEUKOCYTOSIS: Status: RESOLVED | Noted: 2020-05-06 | Resolved: 2020-05-11

## 2020-05-11 LAB
ANION GAP SERPL CALC-SCNC: 10 MMOL/L (ref 8–16)
ANION GAP SERPL CALC-SCNC: 7 MMOL/L (ref 8–16)
BASOPHILS # BLD AUTO: 0.08 K/UL (ref 0–0.2)
BASOPHILS NFR BLD: 0.7 % (ref 0–1.9)
BUN SERPL-MCNC: 15 MG/DL (ref 6–20)
BUN SERPL-MCNC: 21 MG/DL (ref 6–20)
CALCIUM SERPL-MCNC: 8.3 MG/DL (ref 8.7–10.5)
CALCIUM SERPL-MCNC: 9.1 MG/DL (ref 8.7–10.5)
CHLORIDE SERPL-SCNC: 95 MMOL/L (ref 95–110)
CHLORIDE SERPL-SCNC: 97 MMOL/L (ref 95–110)
CO2 SERPL-SCNC: 22 MMOL/L (ref 23–29)
CO2 SERPL-SCNC: 24 MMOL/L (ref 23–29)
CREAT SERPL-MCNC: 0.9 MG/DL (ref 0.5–1.4)
CREAT SERPL-MCNC: 0.9 MG/DL (ref 0.5–1.4)
DIFFERENTIAL METHOD: ABNORMAL
EOSINOPHIL # BLD AUTO: 0.5 K/UL (ref 0–0.5)
EOSINOPHIL NFR BLD: 3.9 % (ref 0–8)
ERYTHROCYTE [DISTWIDTH] IN BLOOD BY AUTOMATED COUNT: 12.1 % (ref 11.5–14.5)
EST. GFR  (AFRICAN AMERICAN): >60 ML/MIN/1.73 M^2
EST. GFR  (AFRICAN AMERICAN): >60 ML/MIN/1.73 M^2
EST. GFR  (NON AFRICAN AMERICAN): >60 ML/MIN/1.73 M^2
EST. GFR  (NON AFRICAN AMERICAN): >60 ML/MIN/1.73 M^2
GLUCOSE SERPL-MCNC: 106 MG/DL (ref 70–110)
GLUCOSE SERPL-MCNC: 94 MG/DL (ref 70–110)
HCT VFR BLD AUTO: 34.6 % (ref 40–54)
HGB BLD-MCNC: 11.5 G/DL (ref 14–18)
IMM GRANULOCYTES # BLD AUTO: 0.04 K/UL (ref 0–0.04)
IMM GRANULOCYTES NFR BLD AUTO: 0.3 % (ref 0–0.5)
LYMPHOCYTES # BLD AUTO: 2.8 K/UL (ref 1–4.8)
LYMPHOCYTES NFR BLD: 22.9 % (ref 18–48)
MCH RBC QN AUTO: 29.9 PG (ref 27–31)
MCHC RBC AUTO-ENTMCNC: 33.2 G/DL (ref 32–36)
MCV RBC AUTO: 90 FL (ref 82–98)
MONOCYTES # BLD AUTO: 1 K/UL (ref 0.3–1)
MONOCYTES NFR BLD: 8.4 % (ref 4–15)
NEUTROPHILS # BLD AUTO: 7.8 K/UL (ref 1.8–7.7)
NEUTROPHILS NFR BLD: 63.8 % (ref 38–73)
NRBC BLD-RTO: 0 /100 WBC
PLATELET # BLD AUTO: 229 K/UL (ref 150–350)
PMV BLD AUTO: 8.9 FL (ref 9.2–12.9)
POTASSIUM SERPL-SCNC: 4.1 MMOL/L (ref 3.5–5.1)
POTASSIUM SERPL-SCNC: 4.5 MMOL/L (ref 3.5–5.1)
RBC # BLD AUTO: 3.85 M/UL (ref 4.6–6.2)
SODIUM SERPL-SCNC: 125 MMOL/L (ref 136–145)
SODIUM SERPL-SCNC: 127 MMOL/L (ref 136–145)
SODIUM SERPL-SCNC: 128 MMOL/L (ref 136–145)
SODIUM SERPL-SCNC: 128 MMOL/L (ref 136–145)
WBC # BLD AUTO: 12.22 K/UL (ref 3.9–12.7)

## 2020-05-11 PROCEDURE — 25000003 PHARM REV CODE 250: Performed by: INTERNAL MEDICINE

## 2020-05-11 PROCEDURE — 99233 PR SUBSEQUENT HOSPITAL CARE,LEVL III: ICD-10-PCS | Mod: ,,, | Performed by: INTERNAL MEDICINE

## 2020-05-11 PROCEDURE — 25000003 PHARM REV CODE 250: Performed by: NURSE PRACTITIONER

## 2020-05-11 PROCEDURE — S4991 NICOTINE PATCH NONLEGEND: HCPCS | Performed by: INTERNAL MEDICINE

## 2020-05-11 PROCEDURE — 63600175 PHARM REV CODE 636 W HCPCS: Performed by: INTERNAL MEDICINE

## 2020-05-11 PROCEDURE — 80048 BASIC METABOLIC PNL TOTAL CA: CPT

## 2020-05-11 PROCEDURE — 99233 PR SUBSEQUENT HOSPITAL CARE,LEVL III: ICD-10-PCS | Mod: ,,, | Performed by: NURSE PRACTITIONER

## 2020-05-11 PROCEDURE — 85025 COMPLETE CBC W/AUTO DIFF WBC: CPT

## 2020-05-11 PROCEDURE — 25000003 PHARM REV CODE 250: Performed by: EMERGENCY MEDICINE

## 2020-05-11 PROCEDURE — 99233 SBSQ HOSP IP/OBS HIGH 50: CPT | Mod: ,,, | Performed by: NURSE PRACTITIONER

## 2020-05-11 PROCEDURE — 25000003 PHARM REV CODE 250: Performed by: FAMILY MEDICINE

## 2020-05-11 PROCEDURE — 99900038 HC OT GENERIC THERAPY SCREENING (STAT)

## 2020-05-11 PROCEDURE — A4216 STERILE WATER/SALINE, 10 ML: HCPCS | Performed by: INTERNAL MEDICINE

## 2020-05-11 PROCEDURE — 20000000 HC ICU ROOM

## 2020-05-11 PROCEDURE — 84295 ASSAY OF SERUM SODIUM: CPT

## 2020-05-11 PROCEDURE — 99233 SBSQ HOSP IP/OBS HIGH 50: CPT | Mod: ,,, | Performed by: INTERNAL MEDICINE

## 2020-05-11 RX ORDER — ALPRAZOLAM 1 MG/1
1 TABLET ORAL NIGHTLY
Status: DISCONTINUED | OUTPATIENT
Start: 2020-05-11 | End: 2020-05-13 | Stop reason: HOSPADM

## 2020-05-11 RX ORDER — POLYETHYLENE GLYCOL 3350 17 G/17G
17 POWDER, FOR SOLUTION ORAL DAILY
Status: DISCONTINUED | OUTPATIENT
Start: 2020-05-11 | End: 2020-05-13 | Stop reason: HOSPADM

## 2020-05-11 RX ORDER — ALPRAZOLAM 0.5 MG/1
0.5 TABLET ORAL 2 TIMES DAILY
Status: DISCONTINUED | OUTPATIENT
Start: 2020-05-12 | End: 2020-05-13 | Stop reason: HOSPADM

## 2020-05-11 RX ADMIN — CARVEDILOL 25 MG: 12.5 TABLET, FILM COATED ORAL at 09:05

## 2020-05-11 RX ADMIN — ALPRAZOLAM 1 MG: 1 TABLET ORAL at 09:05

## 2020-05-11 RX ADMIN — CARVEDILOL 25 MG: 12.5 TABLET, FILM COATED ORAL at 08:05

## 2020-05-11 RX ADMIN — HYDRALAZINE HYDROCHLORIDE 50 MG: 50 TABLET, FILM COATED ORAL at 01:05

## 2020-05-11 RX ADMIN — ASPIRIN 81 MG 81 MG: 81 TABLET ORAL at 08:05

## 2020-05-11 RX ADMIN — ENOXAPARIN SODIUM 40 MG: 100 INJECTION SUBCUTANEOUS at 04:05

## 2020-05-11 RX ADMIN — Medication 400 MG: at 08:05

## 2020-05-11 RX ADMIN — VERAPAMIL HYDROCHLORIDE 180 MG: 180 TABLET, FILM COATED, EXTENDED RELEASE ORAL at 08:05

## 2020-05-11 RX ADMIN — HYDRALAZINE HYDROCHLORIDE 50 MG: 50 TABLET, FILM COATED ORAL at 07:05

## 2020-05-11 RX ADMIN — Medication 10 ML: at 01:05

## 2020-05-11 RX ADMIN — FOLIC ACID 1 MG: 1 TABLET ORAL at 08:05

## 2020-05-11 RX ADMIN — NIFEDIPINE 60 MG: 30 TABLET, FILM COATED, EXTENDED RELEASE ORAL at 08:05

## 2020-05-11 RX ADMIN — CYCLOBENZAPRINE 10 MG: 10 TABLET, FILM COATED ORAL at 08:05

## 2020-05-11 RX ADMIN — QUETIAPINE FUMARATE 50 MG: 25 TABLET ORAL at 09:05

## 2020-05-11 RX ADMIN — THERA TABS 1 TABLET: TAB at 08:05

## 2020-05-11 RX ADMIN — ALPRAZOLAM 0.5 MG: 0.5 TABLET ORAL at 08:05

## 2020-05-11 RX ADMIN — LORAZEPAM 0.5 MG: 2 INJECTION INTRAMUSCULAR; INTRAVENOUS at 09:05

## 2020-05-11 RX ADMIN — Medication 400 MG: at 09:05

## 2020-05-11 RX ADMIN — SIMVASTATIN 40 MG: 20 TABLET, FILM COATED ORAL at 09:05

## 2020-05-11 RX ADMIN — PANTOPRAZOLE SODIUM 40 MG: 40 TABLET, DELAYED RELEASE ORAL at 08:05

## 2020-05-11 RX ADMIN — Medication 1 PATCH: at 08:05

## 2020-05-11 RX ADMIN — ALPRAZOLAM 0.5 MG: 0.5 TABLET ORAL at 01:05

## 2020-05-11 RX ADMIN — TAMSULOSIN HYDROCHLORIDE 0.4 MG: 0.4 CAPSULE ORAL at 08:05

## 2020-05-11 RX ADMIN — Medication 100 MG: at 08:05

## 2020-05-11 RX ADMIN — BUSPIRONE HYDROCHLORIDE 5 MG: 5 TABLET ORAL at 09:05

## 2020-05-11 RX ADMIN — BUSPIRONE HYDROCHLORIDE 5 MG: 5 TABLET ORAL at 08:05

## 2020-05-11 RX ADMIN — BUSPIRONE HYDROCHLORIDE 5 MG: 5 TABLET ORAL at 04:05

## 2020-05-11 RX ADMIN — HYDRALAZINE HYDROCHLORIDE 50 MG: 50 TABLET, FILM COATED ORAL at 09:05

## 2020-05-11 RX ADMIN — POLYETHYLENE GLYCOL 3350 17 G: 17 POWDER, FOR SOLUTION ORAL at 01:05

## 2020-05-11 RX ADMIN — TAMSULOSIN HYDROCHLORIDE 0.4 MG: 0.4 CAPSULE ORAL at 09:05

## 2020-05-11 NOTE — PLAN OF CARE
Pt continues to have periods of confusion and agitation mixed with periods of intact mentation. He and his family have been updated per nursing and the ACNP Marcia.

## 2020-05-11 NOTE — PT/OT/SLP PROGRESS
Occupational Therapy      Patient Name:  Juwan Pozo   MRN:  [id number]    eval initiated via chart review as well as attempted interview. Pt unable to follow commands and participate with therapy session.pt on hold per nurse Varela.  Will attempt to complete therapy on eval on later date  Noreen Moody OT  5/11/2020   0939

## 2020-05-11 NOTE — ASSESSMENT & PLAN NOTE
Nifedipine, Hydralazine, Verapamil, and Carvedilol.  No ACE/ARB/Diuretic with acute hyponatremia.  5/10 - escalate nifedipine and hydralazine; continue verapamil and coreg dosing; continue ICU hemodynamic monitoring  5/11 - continue verapamil, coreg, nifedipine, and hydralazine

## 2020-05-11 NOTE — PT/OT/SLP EVAL
Physical Therapy Evaluation    Patient Name:  Juwan Pozo   MRN:  [id number]    Recommendations:     Discharge Recommendations:  (tbd pending progress - probabaly home if cognition improves and has help at home)   Discharge Equipment Recommendations: (tbd)   Barriers to discharge: unsure of cg support    Assessment:     Juwan Pozo is a 59 y.o. male admitted with a medical diagnosis of Hyponatremia.  He presents with the following impairments/functional limitations:  weakness, impaired functional mobilty, decreased safety awareness, impaired cognition, impaired endurance, gait instability, decreased coordination, impaired balance, impaired self care skills, decreased lower extremity function .    Rehab Prognosis: Fair; patient would benefit from acute skilled PT services to address these deficits and reach maximum level of function.    Recent Surgery: * No surgery found *      Plan:     During this hospitalization, patient to be seen 5 x/week to address the identified rehab impairments via gait training, therapeutic activities, therapeutic exercises and progress toward the following goals:    · Plan of Care Expires:  05/17/20    Subjective     Chief Complaint: wants to get oob  Patient/Family Comments/goals: to get up and go home  Pain/Comfort:  ·      Patients cultural, spiritual, [employer] conflicts given the current situation:      Living Environment:  Unsure of living situation/pt. confused  Prior to admission, patients level of function was indep.  Equipment used at home: none.  DME owned (not currently used): none.  Upon discharge, patient will have assistance from ???.    Objective:     Communicated with RN prior to session.  Patient found HOB elevated with blood pressure cuff, peripheral IV, telemetry, pulse ox (continuous)  upon PT entry to room.    General Precautions: Standard, fall   Orthopedic Precautions:N/A   Braces:       Exams:  · B LE strength grossly at least 3+/5; rom wfl    Functional  Mobility:  · Bed Mobility min A supine to/from sit and scooting in bed  · Transfers - RN stated to keep patient in bed - still confused and agitated  · Gt - nt      Therapeutic Activities and Exercises:   PT educated patient on POC, B LE TE to do in bed in prep for functional mobility and safety/fall precautions with mobility    AM-PAC 6 CLICK MOBILITY  Total Score:      Patient left HOB elevated with all lines intact, call button in reach, RN notified and RN present.    GOALS:   Multidisciplinary Problems     Physical Therapy Goals        Problem: Physical Therapy Goal    Goal Priority Disciplines Outcome Goal Variances Interventions   Physical Therapy Goal     PT, PT/OT      Description:  1. Patient will perform supine to/from sit min A  2. Patient will perform sit to/from stand min A with least AD  3. Patient will ambulate 100ft min A least AD                    History:     Past Medical History:   Diagnosis Date    Depression     High cholesterol     Hypertension        Past Surgical History:   Procedure Laterality Date    APPENDECTOMY      arm surgery      KNEE SURGERY         Time Tracking:     PT Received On: 05/09/20  PT Start Time: 0935     PT Stop Time: 1000  PT Total Time (min): 25 min     Billable Minutes: Evaluation 15 and Therapeutic Exercise 10      Mike Noguera, PT  05/11/2020

## 2020-05-11 NOTE — ASSESSMENT & PLAN NOTE
Serum sodium 121, with chloride 88, suggesting volume depletion.  Patient clinically appears dry.  Follow-up on urine osmolality.  States that he drinks 4-5 glasses of water per day, denies excessive free water intake.  Continue normal saline at 75 cc/hour for 1 L.  BMP every 6 hr x4.  Avoid over-correction, no more than 8-10 mEq sodium correction per day.  Hold SSRI.    5/5  Stat labs  Urine osm pending  Urine Na 132  BNP wnl  tsh wnl  Continue ivf @75  No recent changes to depression meds. Holding SSRI  Restart oral xanax and d/c ativan  Avoid overcorrection of na  Seizure precautions    5/6  Worsening to 115  Likely siadh given h/o use of drugs causing siadh  ddx adrenal insufficiency given h/o rifampin  Awaiting cortisol and acth  Started salt tablets bid  Fluid restriction to 1L  D/c ivf  neph consulted   Started tolvaptan  5/11/20 The patient received tolvaptan on 5/7/20. Yesterday the patient was placed on a 1000 ml fluid restriction. The patients Na has increased to 127 today, Nephrology following. While in the ICU the patient developed some ICU delirium which is improved today. Will continue to monitor and plan to discharge once confusion resolves.

## 2020-05-11 NOTE — PROGRESS NOTES
[facility] -   Nephrology  Progress Note    Patient Name: Juwan Pozo  MRN: [id number]  Admission Date: 5/4/2020  Hospital Length of Stay: 7 days  Attending Provider: Timoteo Guzman MD   Primary Care Physician: Primary Doctor No  Principal Problem:Hyponatremia    Subjective:     HPI: Juwan Pozo is a 59-year-old  man with history of hypertension, anxiety, PTSD, Major depression, on medications including Prozac, patient was admitted to the hospital yesterday for generalized weakness, his serum sodium on presentation was 121, initially thought that he might be dehydrated hydration was started on IV fluids, his serum sodium decreased to 117, we were consulted  because of worsening hyponatremia, renal function is stable with serum creatinine 0.8 mg/dL,    Interval History:     5/8/2020: Sodium now at 126. S/p Tolvaptan yesterday.     5/9/2020: Sodium level has increased to 129 today.     5/10/2020: Na has declined to 125.     5/11/20: Na has increased to 127 with fluid restrictions.         Review of patient's allergies indicates:   Allergen Reactions    Codeine     Penicillins      Current Facility-Administered Medications   Medication Frequency    acetaminophen tablet 650 mg Q6H PRN    albuterol-ipratropium 2.5 mg-0.5 mg/3 mL nebulizer solution 3 mL Q4H PRN    ALPRAZolam tablet 0.5 mg TID PRN    aluminum-magnesium hydroxide-simethicone 200-200-20 mg/5 mL suspension 30 mL Q6H PRN    aspirin chewable tablet 81 mg Daily    busPIRone tablet 5 mg TID    carvediloL tablet 25 mg BID    cyclobenzaprine tablet 10 mg TID PRN    diphenhydrAMINE capsule 25 mg Q6H PRN    enoxaparin injection 40 mg Daily    folic acid tablet 1 mg Daily    guaifenesin 100 mg/5 ml syrup 200 mg Q4H PRN    hydrALAZINE tablet 50 mg Q8H    influenza (QUADRIVALENT PF) vaccine 0.5 mL vaccine x 1 dose    labetaloL injection 10 mg Q6H PRN    magnesium oxide tablet 400 mg BID    multivitamin tablet Daily     nicotine 21 mg/24 hr 1 patch Daily    NIFEdipine 24 hr tablet 60 mg Daily    ondansetron injection 4 mg Q8H PRN    pantoprazole EC tablet 40 mg Daily    polyethylene glycol packet 17 g Daily    QUEtiapine tablet 50 mg QHS    simvastatin tablet 40 mg QHS    sodium chloride 0.9% flush 10 mL Q6H    And    sodium chloride 0.9% flush 10 mL PRN    tamsulosin 24 hr capsule 0.4 mg BID    thiamine tablet 100 mg Daily    verapamiL CR tablet 180 mg Daily       Objective:     Vital Signs (Most Recent):  Temp: 98.5 °F (36.9 °C) (05/11/20 0000)  Pulse: 102 (05/11/20 0757)  Resp: 18 (05/11/20 0757)  BP: (!) 154/103 (05/11/20 0813)  SpO2: 100 % (05/11/20 0757)  O2 Device (Oxygen Therapy): room air (05/11/20 0757) Vital Signs (24h Range):  Temp:  [98.1 °F (36.7 °C)-98.5 °F (36.9 °C)] 98.5 °F (36.9 °C)  Pulse:  [] 102  Resp:  [12-21] 18  SpO2:  [86 %-100 %] 100 %  BP: ()/() 154/103     Weight: 91.6 kg (201 lb 15.1 oz) (05/06/20 2300)  Body mass index is 26.64 kg/m².  Body surface area is 2.17 meters squared.    I/O last 3 completed shifts:  In: 1350 [P.O.:1270; I.V.:80]  Out: 2770 [Urine:2770]    Physical Exam   Constitutional: He appears well-developed. He is cooperative.   HENT:   Head: Normocephalic.   Nose: No rhinorrhea.   Mouth/Throat: Mucous membranes are normal. No oropharyngeal exudate.   Neck: No thyroid mass present.   Cardiovascular: Normal rate, regular rhythm, S1 normal, S2 normal and intact distal pulses.   Pulmonary/Chest: Effort normal. No respiratory distress. He has no wheezes.   Abdominal: Soft. Bowel sounds are normal. He exhibits no distension. There is no tenderness. No hernia.   Lymphadenopathy:     He has no cervical adenopathy.   Neurological: He is alert.   Responsive.   Skin: Skin is warm.       Significant Labs:  Lab Results   Component Value Date    CREATININE 0.9 05/11/2020    BUN 21 (H) 05/11/2020     (L) 05/11/2020    K 4.1 05/11/2020    CL 95 05/11/2020    CO2 22  (L) 05/11/2020     Lab Results   Component Value Date    CALCIUM 8.3 (L) 05/11/2020    PHOS 2.8 05/07/2020     Lab Results   Component Value Date    ALBUMIN 3.5 05/07/2020     Lab Results   Component Value Date    WBC 13.14 (H) 05/06/2020    HGB 14.6 05/06/2020    HCT 42.2 05/06/2020    MCV 87 05/06/2020     05/06/2020       Recent Labs   Lab 05/10/20  0256   MG 1.6         Significant Imaging:  Imaging Results          CT Head Without Contrast (Final result)  Result time 05/04/20 18:10:27    Final result by Calvin Bourgeois MD (05/04/20 18:10:27)                 Impression:      No acute findings.    All CT scans at this facility are performed  using dose modulation techniques as appropriate to performed exam including the following:  automated exposure control; adjustment of mA and/or kV according to the patients size (this includes techniques or standardized protocols for targeted exams where dose is matched to indication/reason for exam: i.e. extremities or head);  iterative reconstruction technique.      Electronically signed by: Calvin Bourgeois  Date:    05/04/2020  Time:    18:10             Narrative:    EXAMINATION:  CT HEAD WITHOUT CONTRAST    CLINICAL HISTORY:  Dizziness;.    TECHNIQUE:  Low dose axial images were obtained through the head.  Coronal and sagittal reformations were also performed. Contrast was not administered.    COMPARISON:  None.    FINDINGS:  Midline structures are intact. Ventricles are of normal size and configuration. Brain parenchyma is normal with normal differentiation of the gray-white matter.    No intracranial hemorrhage,acute infarct, or suspicious intracranial lesion is identified.    Skull base and calvarium are normal. Visualized sinuses and mastoid air cells are clear.                               X-Ray Chest AP Portable (Final result)  Result time 05/04/20 15:26:29    Final result by Kal Arreaga MD (05/04/20 15:26:29)                 Impression:       No acute findings.      Electronically signed by: Kal Arreaga MD  Date:    05/04/2020  Time:    15:26             Narrative:    EXAMINATION:  XR CHEST AP PORTABLE    CLINICAL HISTORY:  Chest Pain;    TECHNIQUE:  Single frontal view of the chest was performed.    COMPARISON:  None    FINDINGS:  The cardiomediastinal silhouette is normal.    The lungs are clear.  No pleural effusions.    Multilevel spondylosis in the spine.                                  Assessment/Plan:     * Hyponatremia  1.  Hyponatremia:  Possibly due to SIADH, at least in part due to his SSRI meds,  elevated urine osmolality, TSH and Cortisol OK, head CT negative for malignancy, patient received a dose of tolvaptan on 5/7/20,  baseline sodium about 125-130, Na 127 today. Monitor for now. Continue fluid restrictions at 1000 ml/day. Avoid IV fluids.     2.  Hypertension: titrate meds as needed.     3.  Stable renal function, serumCr 0.9 mg/dl.     4.  Metabolic acidosis: cont  sodium bicarbonate supplements.           Thank you for your consult. I will follow-up with patient. Please contact us if you have any additional questions.    Cem Mendez MD  Nephrology  [facility] -

## 2020-05-11 NOTE — NURSING
Blood pressure was well-controlled tonight. Patient conversing more appropriately. Good urine output. Afebrile. Bed bath given. Linens changed. Oral care done. Turned q2hr.

## 2020-05-11 NOTE — ASSESSMENT & PLAN NOTE
- Patient thoroughly counseled on smoking cessation, pt verbalized understanding.  - Nicotine patch

## 2020-05-11 NOTE — SUBJECTIVE & OBJECTIVE
Interval History:     5/8/2020: Sodium now at 126. S/p Tolvaptan yesterday.     5/9/2020: Sodium level has increased to 129 today.     5/10/2020: Na has declined to 125.     5/11/20: Na has increased to 127 with fluid restrictions.         Review of patient's allergies indicates:   Allergen Reactions    Codeine     Penicillins      Current Facility-Administered Medications   Medication Frequency    acetaminophen tablet 650 mg Q6H PRN    albuterol-ipratropium 2.5 mg-0.5 mg/3 mL nebulizer solution 3 mL Q4H PRN    ALPRAZolam tablet 0.5 mg TID PRN    aluminum-magnesium hydroxide-simethicone 200-200-20 mg/5 mL suspension 30 mL Q6H PRN    aspirin chewable tablet 81 mg Daily    busPIRone tablet 5 mg TID    carvediloL tablet 25 mg BID    cyclobenzaprine tablet 10 mg TID PRN    diphenhydrAMINE capsule 25 mg Q6H PRN    enoxaparin injection 40 mg Daily    folic acid tablet 1 mg Daily    guaifenesin 100 mg/5 ml syrup 200 mg Q4H PRN    hydrALAZINE tablet 50 mg Q8H    influenza (QUADRIVALENT PF) vaccine 0.5 mL vaccine x 1 dose    labetaloL injection 10 mg Q6H PRN    magnesium oxide tablet 400 mg BID    multivitamin tablet Daily    nicotine 21 mg/24 hr 1 patch Daily    NIFEdipine 24 hr tablet 60 mg Daily    ondansetron injection 4 mg Q8H PRN    pantoprazole EC tablet 40 mg Daily    polyethylene glycol packet 17 g Daily    QUEtiapine tablet 50 mg QHS    simvastatin tablet 40 mg QHS    sodium chloride 0.9% flush 10 mL Q6H    And    sodium chloride 0.9% flush 10 mL PRN    tamsulosin 24 hr capsule 0.4 mg BID    thiamine tablet 100 mg Daily    verapamiL CR tablet 180 mg Daily       Objective:     Vital Signs (Most Recent):  Temp: 98.5 °F (36.9 °C) (05/11/20 0000)  Pulse: 102 (05/11/20 0757)  Resp: 18 (05/11/20 0757)  BP: (!) 154/103 (05/11/20 0813)  SpO2: 100 % (05/11/20 0757)  O2 Device (Oxygen Therapy): room air (05/11/20 0757) Vital Signs (24h Range):  Temp:  [98.1 °F (36.7 °C)-98.5 °F (36.9 °C)] 98.5  °F (36.9 °C)  Pulse:  [] 102  Resp:  [12-21] 18  SpO2:  [86 %-100 %] 100 %  BP: ()/() 154/103     Weight: 91.6 kg (201 lb 15.1 oz) (05/06/20 2300)  Body mass index is 26.64 kg/m².  Body surface area is 2.17 meters squared.    I/O last 3 completed shifts:  In: 1350 [P.O.:1270; I.V.:80]  Out: 2770 [Urine:2770]    Physical Exam   Constitutional: He appears well-developed. He is cooperative.   HENT:   Head: Normocephalic.   Nose: No rhinorrhea.   Mouth/Throat: Mucous membranes are normal. No oropharyngeal exudate.   Neck: No thyroid mass present.   Cardiovascular: Normal rate, regular rhythm, S1 normal, S2 normal and intact distal pulses.   Pulmonary/Chest: Effort normal. No respiratory distress. He has no wheezes.   Abdominal: Soft. Bowel sounds are normal. He exhibits no distension. There is no tenderness. No hernia.   Lymphadenopathy:     He has no cervical adenopathy.   Neurological: He is alert.   Responsive.   Skin: Skin is warm.       Significant Labs:  Lab Results   Component Value Date    CREATININE 0.9 05/11/2020    BUN 21 (H) 05/11/2020     (L) 05/11/2020    K 4.1 05/11/2020    CL 95 05/11/2020    CO2 22 (L) 05/11/2020     Lab Results   Component Value Date    CALCIUM 8.3 (L) 05/11/2020    PHOS 2.8 05/07/2020     Lab Results   Component Value Date    ALBUMIN 3.5 05/07/2020     Lab Results   Component Value Date    WBC 13.14 (H) 05/06/2020    HGB 14.6 05/06/2020    HCT 42.2 05/06/2020    MCV 87 05/06/2020     05/06/2020       Recent Labs   Lab 05/10/20  0256   MG 1.6         Significant Imaging:  Imaging Results          CT Head Without Contrast (Final result)  Result time 05/04/20 18:10:27    Final result by Calvin Bourgeois MD (05/04/20 18:10:27)                 Impression:      No acute findings.    All CT scans at this facility are performed  using dose modulation techniques as appropriate to performed exam including the following:  automated exposure control;  adjustment of mA and/or kV according to the patients size (this includes techniques or standardized protocols for targeted exams where dose is matched to indication/reason for exam: i.e. extremities or head);  iterative reconstruction technique.      Electronically signed by: Calvin Bourgeois  Date:    05/04/2020  Time:    18:10             Narrative:    EXAMINATION:  CT HEAD WITHOUT CONTRAST    CLINICAL HISTORY:  Dizziness;.    TECHNIQUE:  Low dose axial images were obtained through the head.  Coronal and sagittal reformations were also performed. Contrast was not administered.    COMPARISON:  None.    FINDINGS:  Midline structures are intact. Ventricles are of normal size and configuration. Brain parenchyma is normal with normal differentiation of the gray-white matter.    No intracranial hemorrhage,acute infarct, or suspicious intracranial lesion is identified.    Skull base and calvarium are normal. Visualized sinuses and mastoid air cells are clear.                               X-Ray Chest AP Portable (Final result)  Result time 05/04/20 15:26:29    Final result by Kal Arreaga MD (05/04/20 15:26:29)                 Impression:      No acute findings.      Electronically signed by: Kal Arreaga MD  Date:    05/04/2020  Time:    15:26             Narrative:    EXAMINATION:  XR CHEST AP PORTABLE    CLINICAL HISTORY:  Chest Pain;    TECHNIQUE:  Single frontal view of the chest was performed.    COMPARISON:  None    FINDINGS:  The cardiomediastinal silhouette is normal.    The lungs are clear.  No pleural effusions.    Multilevel spondylosis in the spine.

## 2020-05-11 NOTE — PROGRESS NOTES
[facility] -   Critical Care Medicine  Progress Note    Patient Name: Juwan Pozo  MRN: [id number]  Admission Date: 5/4/2020  Hospital Length of Stay: 7 days  Code Status: Full Code  Attending Provider: Timoteo Guzman MD  Primary Care Provider: Primary Doctor No   Principal Problem: Hyponatremia    Subjective:     HPI:  59 year old male with PMH including depression, anxiety, HTN, and elevated cholesterol  Presented to ED on 5/4 with complaints of substernal chest tightness, malaise, dry mouth, lower extremity numbness  Evaluation revealed elevated BP that improved with IV labetalol; sodium level 121; CT head unremarkable; CXR without infiltrates or acute findings; and COVID19 negative  Admitted to inpt floor for management of hyponatremia  Early on morning of 5/7 he transferred to ICU for close monitoring and potential initiating 3% Saline infusion after failing conservative management.     Hospital/ICU Course:  5/7 - transferred to ICU overnight; awake and alert but minimal interaction with caregivers, no verbalization; given 2nd dose talvaptan per nephrology and noted dramatic increase in urine output  5/8 - More awake and interactive today, OOB to chair with nursing assist; Na trend up overnight, peaked at 128 before initiation of D5 IVF by nephrology; this am Na trend down at 126  5/9 - resting, easily aroused this morning, conversation requires frequent stimuli; complains of malaise and myalgias, non specific along with complaint of cold breakfast and wanting to rest  5/10 - sodium peaked at 129 yesterday and slow trend down today at 125; continues to require prn meds for HTN; reported not sleeping at night, confused and hallucinating this morning   5/11 - sodium holding @ 127; awake and alert this morning, complains of some muscle weakness and intermittent tremor, confused to recent events only and no current hallucinations; RN reports slept ~5 hours last night    Review of Systems    Constitutional: Positive for malaise/fatigue.   HENT: Negative for congestion and sinus pain.    Respiratory: Negative for shortness of breath.    Cardiovascular: Negative for chest pain.   Gastrointestinal: Negative for abdominal pain, nausea and vomiting.   Musculoskeletal: Positive for myalgias. Negative for joint pain.   Neurological: Positive for tremors and weakness. Negative for tingling and focal weakness.   Psychiatric/Behavioral: The patient is nervous/anxious. The patient does not have insomnia.          Objective:     Vital Signs (Most Recent):  Temp: 98.5 °F (36.9 °C) (05/11/20 0000)  Pulse: 102 (05/11/20 0757)  Resp: 18 (05/11/20 0757)  BP: (!) 154/103 (05/11/20 0813)  SpO2: 100 % (05/11/20 0757) Vital Signs (24h Range):  Temp:  [98.1 °F (36.7 °C)-98.5 °F (36.9 °C)] 98.5 °F (36.9 °C)  Pulse:  [] 102  Resp:  [12-21] 18  SpO2:  [86 %-100 %] 100 %  BP: ()/() 154/103     Weight: 91.6 kg (201 lb 15.1 oz)  Body mass index is 26.64 kg/m².      Intake/Output Summary (Last 24 hours) at 5/11/2020 0856  Last data filed at 5/11/2020 0400  Gross per 24 hour   Intake 860 ml   Output 1440 ml   Net -580 ml       Physical Exam   Constitutional: He appears well-nourished. No distress.   HENT:   Head: Atraumatic.   Eyes: Pupils are equal, round, and reactive to light. Conjunctivae are normal.   Neck: No JVD present. No tracheal deviation present.   Cardiovascular: Normal rate and regular rhythm.   Pulmonary/Chest: Effort normal and breath sounds normal.   Abdominal: Soft. Bowel sounds are normal. He exhibits no distension.   Musculoskeletal: He exhibits no edema.   Neurological: He is alert. GCS eye subscore is 4. GCS verbal subscore is 4. GCS motor subscore is 6.   Skin: Skin is warm and dry. Capillary refill takes less than 2 seconds. No cyanosis.   Residual erythema and small induration to left axilla (treated outpatient for abscess)denies tenderness/pain   Psychiatric: His speech is normal. His  mood appears anxious. He is not hyperactive and not actively hallucinating. He does not express impulsivity.       Vents:  Oxygen Concentration (%): 21 (05/09/20 2100)    Lines/Drains/Airways     Peripherally Inserted Central Catheter Line            PICC Double Lumen 05/07/20 0110 right basilic 4 days          Drain                 Urethral Catheter 05/06/20 1900 Double-lumen 4 days          Peripheral Intravenous Line                 Peripheral IV - Single Lumen 05/06/20 1813 20 G Anterior;Right Forearm 4 days                Significant Labs:    CBC/Anemia Profile:  Recent Labs   Lab 05/10/20  0256 05/11/20  0346   WBC 11.89 12.22   HGB 11.6* 11.5*   HCT 34.2* 34.6*    229   MCV 89 90   RDW 12.1 12.1        Chemistries:  Recent Labs   Lab 05/10/20  0256  05/10/20  1600 05/10/20  2136 05/11/20  0346   *   < > 126* 127* 127*   K 3.6  --  4.4  --  4.1   CL 94*  --  94*  --  95   CO2 22*  --  23  --  22*   BUN 15  --  18  --  21*   CREATININE 0.7  --  1.0  --  0.9   CALCIUM 8.3*  --  8.8  --  8.3*   MG 1.6  --   --   --   --     < > = values in this interval not displayed.       All pertinent labs within the past 24 hours have been reviewed.        ABG  No results for input(s): PH, PO2, PCO2, HCO3, BE in the last 168 hours.  Assessment/Plan:     Psychiatric  Delirium due to multiple etiologies, acute, hyperactive  Likely s/t withdrawal syndrome (fluoxetine, benzo, muscle relaxer use at baseline; fluoxetine stopped abruptly s/t hyponatremia) + critical illness/ICU psychosis  ODS unlikely given waxing/waning symptomology and sodium correction remaining less than 8 per day  Cautious muscle relaxer and escalation of benzo (home xanax 1mg TID prn, will escalate inpt to 0.5mg TID prn)  Continue close monitoring of sodium correction rate  Normalize routine, encourage daytime wakefulness, and mobilize (consult PT/OT)  HS seroquel to aid sleep/wake cycle  Oral thiamine, folate, and MVI supplementation  5/11 -  improving; continue current plan; will transition out of ICU setting to optimize schedule and minimize sensory overload    Anxiety disorder  Alprazolam as needed.  Holding SSRI.    Cardiac/Vascular  Hypertension, essential  Nifedipine, Hydralazine, Verapamil, and Carvedilol.  No ACE/ARB/Diuretic with acute hyponatremia.  5/10 - escalate nifedipine and hydralazine; continue verapamil and coreg dosing; continue ICU hemodynamic monitoring  5/11 - continue verapamil, coreg, nifedipine, and hydralazine    Renal/  * Hyponatremia  Nephrology following  Labs consistent with SIADH  Goal correction is less than or equal to 8 mmol/L  5/8 - improving after tolvaptan; discussed with nephrology, stopped D5; continue monitoring; consider transfer out of ICU in am if stable  5/9 - holding stable at 129 last 8 hours; continue monitoring  5/10 - continue monitoring  5/11 - holding; continue fluid restriction, monitoring    Hypomagnesemia  Continue oral supplementation    Other  Tobacco use  NRT  Encourage cessation  Offer resource information if amenable prior to discharge     Critical Care Daily Checklist:    A: Awake: RASS Goal/Actual Goal:    Actual: Good Agitation Sedation Scale (RASS): Restless   B: Spontaneous Breathing Trial Performed?     C: SAT & SBT Coordinated?  n/a                      D: Delirium: CAM-ICU Overall CAM-ICU: Positive   E: Early Mobility Performed? Yes   F: Feeding Goal:    Status:     Current Diet Order   Procedures    Diet Adult Regular (IDDSI Level 7) Fluid - 1000mL     Order Specific Question:   Fluid restriction:     Answer:   Fluid - 1000mL      AS: Analgesia/Sedation prn   T: Thromboembolic Prophylaxis lovenox   H: HOB > 300 Yes   U: Stress Ulcer Prophylaxis (if needed) PPI   G: Glucose Control monitoring   B: Bowel Function Stool Occurrence: 0   I: Indwelling Catheter (Lines & Ordaz) Necessity reviewed   D: De-escalation of Antimicrobials/Pharmacotherapies reviewed    Plan for the day/ETD  Transfer out to telemetry floor    Family/Goals of Care: Home on discharge   I have discussed case and plan of care in detail with Dr Guzman and Dr Mendez; Status and plan of care were discussed with team on multidisciplinary rounds.  Spouse, Marianna called with update  Will transfer out of ICU, consult hospital medicine to assume care; once seen and care assumed, we will sign off.   Please call if we can be of additional assistance.     MANNY Em-BC  Critical Care Medicine  [facility] [place]

## 2020-05-11 NOTE — SUBJECTIVE & OBJECTIVE
Interval History: The patient received tolvaptan on 5/7/20. Yesterday the patient was placed on a 1000 ml fluid restriction. The patients Na has increased to 127 today, Nephrology following. While in the ICU the patient developed some ICU delirium which is improved today. Will continue to monitor and plan to discharge once confusion resolves.       Review of Systems   Constitutional: Negative for activity change, appetite change, chills, fatigue, fever and unexpected weight change.   HENT: Negative for congestion, facial swelling, rhinorrhea, sinus pressure, sneezing and sore throat.    Eyes: Negative for discharge, redness and visual disturbance.   Respiratory: Negative for apnea, cough, chest tightness, shortness of breath, wheezing and stridor.    Cardiovascular: Negative for chest pain, palpitations and leg swelling.   Gastrointestinal: Negative for abdominal distention, abdominal pain, anal bleeding, blood in stool, constipation, diarrhea, nausea and vomiting.   Genitourinary: Negative for difficulty urinating, discharge, dysuria, frequency and hematuria.   Musculoskeletal: Positive for myalgias. Negative for arthralgias, back pain, gait problem, joint swelling, neck pain and neck stiffness.   Skin: Negative for color change and pallor.   Neurological: Negative for dizziness, tremors, seizures, syncope, facial asymmetry, speech difficulty, weakness, light-headedness, numbness and headaches.   Psychiatric/Behavioral: Positive for confusion. Negative for behavioral problems, hallucinations and suicidal ideas. The patient is nervous/anxious.    All other systems reviewed and are negative.    Objective:     Vital Signs (Most Recent):  Temp: 98.5 °F (36.9 °C) (05/11/20 0000)  Pulse: 102 (05/11/20 0757)  Resp: 18 (05/11/20 0757)  BP: (!) 154/103 (05/11/20 0813)  SpO2: 100 % (05/11/20 0757) Vital Signs (24h Range):  Temp:  [98.1 °F (36.7 °C)-98.5 °F (36.9 °C)] 98.5 °F (36.9 °C)  Pulse:  [] 102  Resp:  [12-21]  18  SpO2:  [86 %-100 %] 100 %  BP: (106-193)/() 154/103     Weight: 91.6 kg (201 lb 15.1 oz)  Body mass index is 26.64 kg/m².    Intake/Output Summary (Last 24 hours) at 5/11/2020 1032  Last data filed at 5/11/2020 0400  Gross per 24 hour   Intake 570 ml   Output 1290 ml   Net -720 ml      Physical Exam   Constitutional: He appears well-developed and well-nourished. No distress.   HENT:   Head: Normocephalic and atraumatic.   Eyes: Pupils are equal, round, and reactive to light. Conjunctivae are normal.   Neck: Normal range of motion. Neck supple. No JVD present. No tracheal deviation present.   Cardiovascular: Normal rate, regular rhythm, normal heart sounds and intact distal pulses.   No murmur heard.  Pulmonary/Chest: Effort normal and breath sounds normal. No respiratory distress.   Abdominal: Soft. Bowel sounds are normal. He exhibits no distension. There is no tenderness.   Musculoskeletal: He exhibits no edema, tenderness or deformity.   Neurological: He is alert. He is disoriented. GCS eye subscore is 4. GCS verbal subscore is 4. GCS motor subscore is 6.   Skin: Skin is warm and dry. Capillary refill takes less than 2 seconds. No rash noted. No cyanosis or erythema.   Residual erythema and small induration to left axilla (treated outpatient for abscess)denies tenderness/pain   Psychiatric: His speech is normal and behavior is normal. His mood appears anxious. He is not hyperactive and not actively hallucinating. He does not express impulsivity.   Nursing note and vitals reviewed.      Significant Labs: All pertinent labs within the past 24 hours have been reviewed.    Significant Imaging:   Imaging Results          CT Head Without Contrast (Final result)  Result time 05/04/20 18:10:27    Final result by Calvin Bourgeois MD (05/04/20 18:10:27)                 Impression:      No acute findings.    All CT scans at this facility are performed  using dose modulation techniques as appropriate to  performed exam including the following:  automated exposure control; adjustment of mA and/or kV according to the patients size (this includes techniques or standardized protocols for targeted exams where dose is matched to indication/reason for exam: i.e. extremities or head);  iterative reconstruction technique.      Electronically signed by: Calvin Bourgeois  Date:    05/04/2020  Time:    18:10             Narrative:    EXAMINATION:  CT HEAD WITHOUT CONTRAST    CLINICAL HISTORY:  Dizziness;.    TECHNIQUE:  Low dose axial images were obtained through the head.  Coronal and sagittal reformations were also performed. Contrast was not administered.    COMPARISON:  None.    FINDINGS:  Midline structures are intact. Ventricles are of normal size and configuration. Brain parenchyma is normal with normal differentiation of the gray-white matter.    No intracranial hemorrhage,acute infarct, or suspicious intracranial lesion is identified.    Skull base and calvarium are normal. Visualized sinuses and mastoid air cells are clear.                               X-Ray Chest AP Portable (Final result)  Result time 05/04/20 15:26:29    Final result by Kal Arreaga MD (05/04/20 15:26:29)                 Impression:      No acute findings.      Electronically signed by: Kal Arreaga MD  Date:    05/04/2020  Time:    15:26             Narrative:    EXAMINATION:  XR CHEST AP PORTABLE    CLINICAL HISTORY:  Chest Pain;    TECHNIQUE:  Single frontal view of the chest was performed.    COMPARISON:  None    FINDINGS:  The cardiomediastinal silhouette is normal.    The lungs are clear.  No pleural effusions.    Multilevel spondylosis in the spine.

## 2020-05-11 NOTE — ASSESSMENT & PLAN NOTE
Nephrology following  Labs consistent with SIADH  Goal correction is less than or equal to 8 mmol/L  5/8 - improving after tolvaptan; discussed with nephrology, stopped D5; continue monitoring; consider transfer out of ICU in am if stable  5/9 - holding stable at 129 last 8 hours; continue monitoring  5/10 - continue monitoring  5/11 - holding; continue fluid restriction, monitoring

## 2020-05-11 NOTE — SUBJECTIVE & OBJECTIVE
Review of Systems   Constitutional: Positive for malaise/fatigue.   HENT: Negative for congestion and sinus pain.    Respiratory: Negative for shortness of breath.    Cardiovascular: Negative for chest pain.   Gastrointestinal: Negative for abdominal pain, nausea and vomiting.   Musculoskeletal: Positive for myalgias. Negative for joint pain.   Neurological: Positive for tremors and weakness. Negative for tingling and focal weakness.   Psychiatric/Behavioral: The patient is nervous/anxious. The patient does not have insomnia.          Objective:     Vital Signs (Most Recent):  Temp: 98.5 °F (36.9 °C) (05/11/20 0000)  Pulse: 102 (05/11/20 0757)  Resp: 18 (05/11/20 0757)  BP: (!) 154/103 (05/11/20 0813)  SpO2: 100 % (05/11/20 0757) Vital Signs (24h Range):  Temp:  [98.1 °F (36.7 °C)-98.5 °F (36.9 °C)] 98.5 °F (36.9 °C)  Pulse:  [] 102  Resp:  [12-21] 18  SpO2:  [86 %-100 %] 100 %  BP: ()/() 154/103     Weight: 91.6 kg (201 lb 15.1 oz)  Body mass index is 26.64 kg/m².      Intake/Output Summary (Last 24 hours) at 5/11/2020 0856  Last data filed at 5/11/2020 0400  Gross per 24 hour   Intake 860 ml   Output 1440 ml   Net -580 ml       Physical Exam   Constitutional: He appears well-nourished. No distress.   HENT:   Head: Atraumatic.   Eyes: Pupils are equal, round, and reactive to light. Conjunctivae are normal.   Neck: No JVD present. No tracheal deviation present.   Cardiovascular: Normal rate and regular rhythm.   Pulmonary/Chest: Effort normal and breath sounds normal.   Abdominal: Soft. Bowel sounds are normal. He exhibits no distension.   Musculoskeletal: He exhibits no edema.   Neurological: He is alert. GCS eye subscore is 4. GCS verbal subscore is 4. GCS motor subscore is 6.   Skin: Skin is warm and dry. Capillary refill takes less than 2 seconds. No cyanosis.   Residual erythema and small induration to left axilla (treated outpatient for abscess)denies tenderness/pain   Psychiatric: His speech  is normal. His mood appears anxious. He is not hyperactive and not actively hallucinating. He does not express impulsivity.       Vents:  Oxygen Concentration (%): 21 (05/09/20 2100)    Lines/Drains/Airways     Peripherally Inserted Central Catheter Line            PICC Double Lumen 05/07/20 0110 right basilic 4 days          Drain                 Urethral Catheter 05/06/20 1900 Double-lumen 4 days          Peripheral Intravenous Line                 Peripheral IV - Single Lumen 05/06/20 1813 20 G Anterior;Right Forearm 4 days                Significant Labs:    CBC/Anemia Profile:  Recent Labs   Lab 05/10/20  0256 05/11/20  0346   WBC 11.89 12.22   HGB 11.6* 11.5*   HCT 34.2* 34.6*    229   MCV 89 90   RDW 12.1 12.1        Chemistries:  Recent Labs   Lab 05/10/20  0256  05/10/20  1600 05/10/20  2136 05/11/20  0346   *   < > 126* 127* 127*   K 3.6  --  4.4  --  4.1   CL 94*  --  94*  --  95   CO2 22*  --  23  --  22*   BUN 15  --  18  --  21*   CREATININE 0.7  --  1.0  --  0.9   CALCIUM 8.3*  --  8.8  --  8.3*   MG 1.6  --   --   --   --     < > = values in this interval not displayed.       All pertinent labs within the past 24 hours have been reviewed.

## 2020-05-11 NOTE — ASSESSMENT & PLAN NOTE
1.  Hyponatremia:  Possibly due to SIADH, at least in part due to his SSRI meds,  elevated urine osmolality, TSH and Cortisol OK, head CT negative for malignancy, patient received a dose of tolvaptan on 5/7/20,  baseline sodium about 125-130, Na 127 today. Monitor for now. Continue fluid restrictions at 1000 ml/day. Avoid IV fluids.     2.  Hypertension: titrate meds as needed.     3.  Stable renal function, serumCr 0.9 mg/dl.     4.  Metabolic acidosis: cont  sodium bicarbonate supplements.

## 2020-05-11 NOTE — ASSESSMENT & PLAN NOTE
5/5  Elevated  On carvedilol and verapamil  lisinopril discontinued outpt  Will resume  On tele     5/6  Discontinued lisinopril d/t elevated K  Continue carvedilol and verapamil  Added hydralazine  5/11/20 Continue current management with Coreg, hydralazine, nifedipine  - Labetolol PRN

## 2020-05-11 NOTE — PT/OT/SLP PROGRESS
Physical Therapy      Patient Name:  Juwan Pozo   MRN:  [id number]    ATTEMPTED P.T. TX. THIS AM, NURSE ALEXIS REQUESTS TO HOLD DUE TO PT CONFUSION, WILL ASSESS PT NEXT VISIT    Lian Hinton, PT   5/11/2020  0907

## 2020-05-11 NOTE — PROGRESS NOTES
[facility] Hospital Medicine  Progress Note    Patient Name: Juwan Pozo  MRN: [id number]  Patient Class: IP- Inpatient   Admission Date: 5/4/2020  Length of Stay: 7 days  Attending Physician: Timoteo Guzman MD  Primary Care Provider: Primary Doctor No        Subjective:     Principal Problem:Hyponatremia        HPI:  Mr. Pozo is a 59-year-old  female with PMH significant for hypertension, anxiety disorder, presented to the ED complaining of just not feeling well since yesterday afternoon.  Patient reports vague symptoms like anxiety, dry mouth, bilateral lower extremity numbness, chest discomfort.  Denies fever, chills.  Afebrile.  Blood blood pressure 194/92, received labetalol 10 mg IV x1, with improvement in blood pressure to 176/93.  Laboratory workup reveals sodium 121, chloride 88, CO2 20.  CT head unremarkable.  Rest of the laboratory workup unremarkable.  Chest x-ray without infiltrates, masses or effusions.  COVID 19 test negative.    Admitting diagnosis hyponatremia, likely volume depletion related.    Overview/Hospital Course:  5/5: continues to exhibit anxiety. Continues to have nonspecific c/o difficulties taking deep breath, muscle spasms and abdominal discomfort. Was taking abx for abscess. Drained by derm and started on rifampin. Reports stopping lisinopril d/t hypotension. Denies otc supplements. Reports started to workout 1-2 months ago and taking whey protein but no other supplementation. Denies any recent changes to home meds except abx.  5/6: anxiety improving. Na no improvement with NaCl tablets.  On tele. Slight increase in wbc, will obtain blood cx. Afebrile, cxr, and ct head wnl. neph consulted. tolvaptan started. Denies recent use of steroids but was on rifampin for axillary abscesses. Awaiting cortisol and acth 5/11/20 The patient received tolvaptan on 5/7/20. Yesterday the patient was placed on a 1000 ml fluid restriction. The patients Na has increased  to 127 today, Nephrology following. While in the ICU the patient developed some ICU delirium which is improved today. Will continue to monitor and plan to discharge once confusion resolves.     Interval History: The patient received tolvaptan on 5/7/20. Yesterday the patient was placed on a 1000 ml fluid restriction. The patients Na has increased to 127 today, Nephrology following. While in the ICU the patient developed some ICU delirium which is improved today. Will continue to monitor and plan to discharge once confusion resolves.       Review of Systems   Constitutional: Negative for activity change, appetite change, chills, fatigue, fever and unexpected weight change.   HENT: Negative for congestion, facial swelling, rhinorrhea, sinus pressure, sneezing and sore throat.    Eyes: Negative for discharge, redness and visual disturbance.   Respiratory: Negative for apnea, cough, chest tightness, shortness of breath, wheezing and stridor.    Cardiovascular: Negative for chest pain, palpitations and leg swelling.   Gastrointestinal: Negative for abdominal distention, abdominal pain, anal bleeding, blood in stool, constipation, diarrhea, nausea and vomiting.   Genitourinary: Negative for difficulty urinating, discharge, dysuria, frequency and hematuria.   Musculoskeletal: Positive for myalgias. Negative for arthralgias, back pain, gait problem, joint swelling, neck pain and neck stiffness.   Skin: Negative for color change and pallor.   Neurological: Negative for dizziness, tremors, seizures, syncope, facial asymmetry, speech difficulty, weakness, light-headedness, numbness and headaches.   Psychiatric/Behavioral: Positive for confusion. Negative for behavioral problems, hallucinations and suicidal ideas. The patient is nervous/anxious.    All other systems reviewed and are negative.    Objective:     Vital Signs (Most Recent):  Temp: 98.5 °F (36.9 °C) (05/11/20 0000)  Pulse: 102 (05/11/20 0757)  Resp: 18 (05/11/20  0757)  BP: (!) 154/103 (05/11/20 0813)  SpO2: 100 % (05/11/20 0757) Vital Signs (24h Range):  Temp:  [98.1 °F (36.7 °C)-98.5 °F (36.9 °C)] 98.5 °F (36.9 °C)  Pulse:  [] 102  Resp:  [12-21] 18  SpO2:  [86 %-100 %] 100 %  BP: (106-193)/() 154/103     Weight: 91.6 kg (201 lb 15.1 oz)  Body mass index is 26.64 kg/m².    Intake/Output Summary (Last 24 hours) at 5/11/2020 1032  Last data filed at 5/11/2020 0400  Gross per 24 hour   Intake 570 ml   Output 1290 ml   Net -720 ml      Physical Exam   Constitutional: He appears well-developed and well-nourished. No distress.   HENT:   Head: Normocephalic and atraumatic.   Eyes: Pupils are equal, round, and reactive to light. Conjunctivae are normal.   Neck: Normal range of motion. Neck supple. No JVD present. No tracheal deviation present.   Cardiovascular: Normal rate, regular rhythm, normal heart sounds and intact distal pulses.   No murmur heard.  Pulmonary/Chest: Effort normal and breath sounds normal. No respiratory distress.   Abdominal: Soft. Bowel sounds are normal. He exhibits no distension. There is no tenderness.   Musculoskeletal: He exhibits no edema, tenderness or deformity.   Neurological: He is alert. He is disoriented. GCS eye subscore is 4. GCS verbal subscore is 4. GCS motor subscore is 6.   Skin: Skin is warm and dry. Capillary refill takes less than 2 seconds. No rash noted. No cyanosis or erythema.   Residual erythema and small induration to left axilla (treated outpatient for abscess)denies tenderness/pain   Psychiatric: His speech is normal and behavior is normal. His mood appears anxious. He is not hyperactive and not actively hallucinating. He does not express impulsivity.   Nursing note and vitals reviewed.      Significant Labs: All pertinent labs within the past 24 hours have been reviewed.    Significant Imaging:   Imaging Results          CT Head Without Contrast (Final result)  Result time 05/04/20 18:10:27    Final result by Calvin  LEONARDO Bourgeois MD (05/04/20 18:10:27)                 Impression:      No acute findings.    All CT scans at this facility are performed  using dose modulation techniques as appropriate to performed exam including the following:  automated exposure control; adjustment of mA and/or kV according to the patients size (this includes techniques or standardized protocols for targeted exams where dose is matched to indication/reason for exam: i.e. extremities or head);  iterative reconstruction technique.      Electronically signed by: Calvin Bourgeois  Date:    05/04/2020  Time:    18:10             Narrative:    EXAMINATION:  CT HEAD WITHOUT CONTRAST    CLINICAL HISTORY:  Dizziness;.    TECHNIQUE:  Low dose axial images were obtained through the head.  Coronal and sagittal reformations were also performed. Contrast was not administered.    COMPARISON:  None.    FINDINGS:  Midline structures are intact. Ventricles are of normal size and configuration. Brain parenchyma is normal with normal differentiation of the gray-white matter.    No intracranial hemorrhage,acute infarct, or suspicious intracranial lesion is identified.    Skull base and calvarium are normal. Visualized sinuses and mastoid air cells are clear.                               X-Ray Chest AP Portable (Final result)  Result time 05/04/20 15:26:29    Final result by Kal Arreaga MD (05/04/20 15:26:29)                 Impression:      No acute findings.      Electronically signed by: Kal Arreaga MD  Date:    05/04/2020  Time:    15:26             Narrative:    EXAMINATION:  XR CHEST AP PORTABLE    CLINICAL HISTORY:  Chest Pain;    TECHNIQUE:  Single frontal view of the chest was performed.    COMPARISON:  None    FINDINGS:  The cardiomediastinal silhouette is normal.    The lungs are clear.  No pleural effusions.    Multilevel spondylosis in the spine.                                    Assessment/Plan:      * Hyponatremia  Serum sodium 121, with  chloride 88, suggesting volume depletion.  Patient clinically appears dry.  Follow-up on urine osmolality.  States that he drinks 4-5 glasses of water per day, denies excessive free water intake.  Continue normal saline at 75 cc/hour for 1 L.  BMP every 6 hr x4.  Avoid over-correction, no more than 8-10 mEq sodium correction per day.  Hold SSRI.    5/5  Stat labs  Urine osm pending  Urine Na 132  BNP wnl  tsh wnl  Continue ivf @75  No recent changes to depression meds. Holding SSRI  Restart oral xanax and d/c ativan  Avoid overcorrection of na  Seizure precautions    5/6  Worsening to 115  Likely siadh given h/o use of drugs causing siadh  ddx adrenal insufficiency given h/o rifampin  Awaiting cortisol and acth  Started salt tablets bid  Fluid restriction to 1L  D/c ivf  neph consulted   Started tolvaptan  5/11/20 The patient received tolvaptan on 5/7/20. Yesterday the patient was placed on a 1000 ml fluid restriction. The patients Na has increased to 127 today, Nephrology following. While in the ICU the patient developed some ICU delirium which is improved today. Will continue to monitor and plan to discharge once confusion resolves.         Delirium due to multiple etiologies, acute, hyperactive  - While in the ICU the patient developed some ICU delirium which is improved today. Will continue to monitor and plan to discharge once confusion resolves.         Hypomagnesemia  - Check Mg level in am    Hypertension, essential  5/5  Elevated  On carvedilol and verapamil  lisinopril discontinued outpt  Will resume  On tele     5/6  Discontinued lisinopril d/t elevated K  Continue carvedilol and verapamil  Added hydralazine  5/11/20 Continue current management with Coreg, hydralazine, nifedipine  - Labetolol PRN    Tobacco use  - Patient thoroughly counseled on smoking cessation, pt verbalized understanding.  - Nicotine patch      Anxiety disorder  Patient appears severely anxious.    Ativan 1 mg IV q.4 hours as  needed.    5/5  Anxiety persists  Resume home meds  C/o difficulties taking deep breath and chest and body tightness and numbness  Ekg, Cxr and trops on admission wnl  On tele    5/6  Discontinued prozac and amitriptyline   Decreasing use of xanax, known to cause siadh  Limited to 0.5mg qhs  Added buspar  5/11/20 Continue buspar and PRN xanax.         VTE Risk Mitigation (From admission, onward)         Ordered     enoxaparin injection 40 mg  Daily      05/07/20 0217     Place sequential compression device  Until discontinued      05/04/20 2309                Critical care time spent on the evaluation and treatment of severe organ dysfunction, review of pertinent labs and imaging studies, discussions with consulting providers and discussions with patient/family: 40 minutes.      Yasir Ruelas NP  Department of Hospital Medicine   [facility]

## 2020-05-11 NOTE — ASSESSMENT & PLAN NOTE
Patient appears severely anxious.    Ativan 1 mg IV q.4 hours as needed.    5/5  Anxiety persists  Resume home meds  C/o difficulties taking deep breath and chest and body tightness and numbness  Ekg, Cxr and trops on admission wnl  On tele    5/6  Discontinued prozac and amitriptyline   Decreasing use of xanax, known to cause siadh  Limited to 0.5mg qhs  Added buspar  5/11/20 Continue buspar and PRN xanax.

## 2020-05-11 NOTE — ASSESSMENT & PLAN NOTE
- While in the ICU the patient developed some ICU delirium which is improved today. Will continue to monitor and plan to discharge once confusion resolves.

## 2020-05-11 NOTE — ASSESSMENT & PLAN NOTE
Likely s/t withdrawal syndrome (fluoxetine, benzo, muscle relaxer use at baseline; fluoxetine stopped abruptly s/t hyponatremia) + critical illness/ICU psychosis  ODS unlikely given waxing/waning symptomology and sodium correction remaining less than 8 per day  Cautious muscle relaxer and escalation of benzo (home xanax 1mg TID prn, will escalate inpt to 0.5mg TID prn)  Continue close monitoring of sodium correction rate  Normalize routine, encourage daytime wakefulness, and mobilize (consult PT/OT)  HS seroquel to aid sleep/wake cycle  Oral thiamine, folate, and MVI supplementation  5/11 - improving; continue current plan; will transition out of ICU setting to optimize schedule and minimize sensory overload

## 2020-05-12 LAB
ANION GAP SERPL CALC-SCNC: 10 MMOL/L (ref 8–16)
BASOPHILS # BLD AUTO: 0.06 K/UL (ref 0–0.2)
BASOPHILS NFR BLD: 0.5 % (ref 0–1.9)
BUN SERPL-MCNC: 15 MG/DL (ref 6–20)
CALCIUM SERPL-MCNC: 8.7 MG/DL (ref 8.7–10.5)
CHLORIDE SERPL-SCNC: 93 MMOL/L (ref 95–110)
CO2 SERPL-SCNC: 24 MMOL/L (ref 23–29)
CREAT SERPL-MCNC: 0.8 MG/DL (ref 0.5–1.4)
DIFFERENTIAL METHOD: ABNORMAL
EOSINOPHIL # BLD AUTO: 0.5 K/UL (ref 0–0.5)
EOSINOPHIL NFR BLD: 4 % (ref 0–8)
ERYTHROCYTE [DISTWIDTH] IN BLOOD BY AUTOMATED COUNT: 11.9 % (ref 11.5–14.5)
EST. GFR  (AFRICAN AMERICAN): >60 ML/MIN/1.73 M^2
EST. GFR  (NON AFRICAN AMERICAN): >60 ML/MIN/1.73 M^2
GLUCOSE SERPL-MCNC: 100 MG/DL (ref 70–110)
HCT VFR BLD AUTO: 36.3 % (ref 40–54)
HGB BLD-MCNC: 12.1 G/DL (ref 14–18)
IMM GRANULOCYTES # BLD AUTO: 0.04 K/UL (ref 0–0.04)
IMM GRANULOCYTES NFR BLD AUTO: 0.3 % (ref 0–0.5)
LYMPHOCYTES # BLD AUTO: 1.8 K/UL (ref 1–4.8)
LYMPHOCYTES NFR BLD: 14.9 % (ref 18–48)
MAGNESIUM SERPL-MCNC: 1.9 MG/DL (ref 1.6–2.6)
MCH RBC QN AUTO: 29.9 PG (ref 27–31)
MCHC RBC AUTO-ENTMCNC: 33.3 G/DL (ref 32–36)
MCV RBC AUTO: 90 FL (ref 82–98)
MONOCYTES # BLD AUTO: 1.3 K/UL (ref 0.3–1)
MONOCYTES NFR BLD: 10.9 % (ref 4–15)
NEUTROPHILS # BLD AUTO: 8.2 K/UL (ref 1.8–7.7)
NEUTROPHILS NFR BLD: 69.4 % (ref 38–73)
NRBC BLD-RTO: 0 /100 WBC
PLATELET # BLD AUTO: 241 K/UL (ref 150–350)
PMV BLD AUTO: 8.9 FL (ref 9.2–12.9)
POTASSIUM SERPL-SCNC: 4.1 MMOL/L (ref 3.5–5.1)
RBC # BLD AUTO: 4.05 M/UL (ref 4.6–6.2)
SODIUM SERPL-SCNC: 127 MMOL/L (ref 136–145)
WBC # BLD AUTO: 11.85 K/UL (ref 3.9–12.7)

## 2020-05-12 PROCEDURE — 97530 THERAPEUTIC ACTIVITIES: CPT

## 2020-05-12 PROCEDURE — 25000003 PHARM REV CODE 250: Performed by: INTERNAL MEDICINE

## 2020-05-12 PROCEDURE — 63600175 PHARM REV CODE 636 W HCPCS: Performed by: INTERNAL MEDICINE

## 2020-05-12 PROCEDURE — 85025 COMPLETE CBC W/AUTO DIFF WBC: CPT

## 2020-05-12 PROCEDURE — S4991 NICOTINE PATCH NONLEGEND: HCPCS | Performed by: INTERNAL MEDICINE

## 2020-05-12 PROCEDURE — 99233 SBSQ HOSP IP/OBS HIGH 50: CPT | Mod: ,,, | Performed by: NURSE PRACTITIONER

## 2020-05-12 PROCEDURE — A4216 STERILE WATER/SALINE, 10 ML: HCPCS | Performed by: INTERNAL MEDICINE

## 2020-05-12 PROCEDURE — 83735 ASSAY OF MAGNESIUM: CPT

## 2020-05-12 PROCEDURE — 97165 OT EVAL LOW COMPLEX 30 MIN: CPT

## 2020-05-12 PROCEDURE — 21400001 HC TELEMETRY ROOM

## 2020-05-12 PROCEDURE — 99233 PR SUBSEQUENT HOSPITAL CARE,LEVL III: ICD-10-PCS | Mod: ,,, | Performed by: NURSE PRACTITIONER

## 2020-05-12 PROCEDURE — 25000003 PHARM REV CODE 250: Performed by: NURSE PRACTITIONER

## 2020-05-12 PROCEDURE — 25000003 PHARM REV CODE 250: Performed by: EMERGENCY MEDICINE

## 2020-05-12 PROCEDURE — 25000003 PHARM REV CODE 250: Performed by: FAMILY MEDICINE

## 2020-05-12 PROCEDURE — 97116 GAIT TRAINING THERAPY: CPT

## 2020-05-12 PROCEDURE — 80048 BASIC METABOLIC PNL TOTAL CA: CPT

## 2020-05-12 RX ORDER — FUROSEMIDE 20 MG/1
20 TABLET ORAL DAILY
Status: DISCONTINUED | OUTPATIENT
Start: 2020-05-12 | End: 2020-05-13 | Stop reason: HOSPADM

## 2020-05-12 RX ADMIN — Medication 100 MG: at 09:05

## 2020-05-12 RX ADMIN — HYDRALAZINE HYDROCHLORIDE 50 MG: 50 TABLET, FILM COATED ORAL at 01:05

## 2020-05-12 RX ADMIN — Medication 1 G: at 10:05

## 2020-05-12 RX ADMIN — Medication 400 MG: at 09:05

## 2020-05-12 RX ADMIN — Medication 400 MG: at 08:05

## 2020-05-12 RX ADMIN — BUSPIRONE HYDROCHLORIDE 5 MG: 5 TABLET ORAL at 09:05

## 2020-05-12 RX ADMIN — BUSPIRONE HYDROCHLORIDE 5 MG: 5 TABLET ORAL at 03:05

## 2020-05-12 RX ADMIN — NIFEDIPINE 60 MG: 30 TABLET, FILM COATED, EXTENDED RELEASE ORAL at 10:05

## 2020-05-12 RX ADMIN — Medication 10 ML: at 04:05

## 2020-05-12 RX ADMIN — CARVEDILOL 25 MG: 12.5 TABLET, FILM COATED ORAL at 09:05

## 2020-05-12 RX ADMIN — ENOXAPARIN SODIUM 40 MG: 100 INJECTION SUBCUTANEOUS at 04:05

## 2020-05-12 RX ADMIN — QUETIAPINE FUMARATE 50 MG: 25 TABLET ORAL at 08:05

## 2020-05-12 RX ADMIN — VERAPAMIL HYDROCHLORIDE 180 MG: 180 TABLET, FILM COATED, EXTENDED RELEASE ORAL at 09:05

## 2020-05-12 RX ADMIN — SIMVASTATIN 40 MG: 20 TABLET, FILM COATED ORAL at 08:05

## 2020-05-12 RX ADMIN — TAMSULOSIN HYDROCHLORIDE 0.4 MG: 0.4 CAPSULE ORAL at 08:05

## 2020-05-12 RX ADMIN — ASPIRIN 81 MG 81 MG: 81 TABLET ORAL at 09:05

## 2020-05-12 RX ADMIN — FUROSEMIDE 20 MG: 20 TABLET ORAL at 09:05

## 2020-05-12 RX ADMIN — THERA TABS 1 TABLET: TAB at 09:05

## 2020-05-12 RX ADMIN — TAMSULOSIN HYDROCHLORIDE 0.4 MG: 0.4 CAPSULE ORAL at 09:05

## 2020-05-12 RX ADMIN — ALPRAZOLAM 0.5 MG: 0.5 TABLET ORAL at 01:05

## 2020-05-12 RX ADMIN — FOLIC ACID 1 MG: 1 TABLET ORAL at 09:05

## 2020-05-12 RX ADMIN — Medication 1 PATCH: at 09:05

## 2020-05-12 RX ADMIN — HYDRALAZINE HYDROCHLORIDE 50 MG: 50 TABLET, FILM COATED ORAL at 06:05

## 2020-05-12 RX ADMIN — ALPRAZOLAM 1 MG: 1 TABLET ORAL at 08:05

## 2020-05-12 RX ADMIN — BUSPIRONE HYDROCHLORIDE 5 MG: 5 TABLET ORAL at 08:05

## 2020-05-12 RX ADMIN — ALPRAZOLAM 0.5 MG: 0.5 TABLET ORAL at 06:05

## 2020-05-12 RX ADMIN — Medication 1 G: at 08:05

## 2020-05-12 RX ADMIN — CARVEDILOL 25 MG: 12.5 TABLET, FILM COATED ORAL at 08:05

## 2020-05-12 NOTE — SUBJECTIVE & OBJECTIVE
Review of Systems   Constitutional: Positive for malaise/fatigue.   HENT: Negative for congestion and sinus pain.    Respiratory: Negative for shortness of breath.    Cardiovascular: Negative for chest pain.   Gastrointestinal: Negative for abdominal pain, nausea and vomiting.   Musculoskeletal: Positive for myalgias. Negative for joint pain.   Neurological: Positive for tremors and weakness. Negative for tingling and focal weakness.   Psychiatric/Behavioral: The patient is nervous/anxious. The patient does not have insomnia.          Objective:     Vital Signs (Most Recent):  Temp: 98.2 °F (36.8 °C) (05/12/20 0914)  Pulse: 99 (05/12/20 0914)  Resp: 18 (05/12/20 0914)  BP: 90/61 (05/12/20 0914)  SpO2: 95 % (05/12/20 0914) Vital Signs (24h Range):  Temp:  [97.8 °F (36.6 °C)-98.4 °F (36.9 °C)] 98.2 °F (36.8 °C)  Pulse:  [] 99  Resp:  [12-19] 18  SpO2:  [91 %-100 %] 95 %  BP: ()/() 90/61     Weight: 91.6 kg (201 lb 15.1 oz)  Body mass index is 26.64 kg/m².      Intake/Output Summary (Last 24 hours) at 5/12/2020 0915  Last data filed at 5/12/2020 0600  Gross per 24 hour   Intake 470 ml   Output 3750 ml   Net -3280 ml       Physical Exam   Constitutional: He appears well-nourished. No distress.   HENT:   Head: Atraumatic.   Eyes: Pupils are equal, round, and reactive to light. Conjunctivae are normal.   Neck: No JVD present. No tracheal deviation present.   Cardiovascular: Normal rate and regular rhythm.   Pulmonary/Chest: Effort normal and breath sounds normal.   Abdominal: Soft. Bowel sounds are normal. He exhibits no distension.   Musculoskeletal: He exhibits no edema.   Neurological: He is alert. GCS eye subscore is 4. GCS verbal subscore is 4. GCS motor subscore is 6.   Skin: Skin is warm and dry. Capillary refill takes less than 2 seconds. No cyanosis.   Residual erythema and small induration to left axilla (treated outpatient for abscess)denies tenderness/pain   Psychiatric: His speech is normal.  His mood appears not anxious. His affect is blunt. He is withdrawn. He is not agitated, not hyperactive and not actively hallucinating. Thought content is not paranoid. He does not express impulsivity.       Vents:  Oxygen Concentration (%): 21 (05/09/20 2100)    Lines/Drains/Airways     Peripherally Inserted Central Catheter Line            PICC Double Lumen 05/07/20 0110 right basilic 5 days                Significant Labs:    CBC/Anemia Profile:  Recent Labs   Lab 05/11/20  0346 05/12/20  0522   WBC 12.22 11.85   HGB 11.5* 12.1*   HCT 34.6* 36.3*    241   MCV 90 90   RDW 12.1 11.9        Chemistries:  Recent Labs   Lab 05/11/20  0346  05/11/20  1609 05/11/20  2310 05/12/20  0522   *   < > 128* 125* 127*   K 4.1  --  4.5  --  4.1   CL 95  --  97  --  93*   CO2 22*  --  24  --  24   BUN 21*  --  15  --  15   CREATININE 0.9  --  0.9  --  0.8   CALCIUM 8.3*  --  9.1  --  8.7   MG  --   --   --   --  1.9    < > = values in this interval not displayed.       All pertinent labs within the past 24 hours have been reviewed.

## 2020-05-12 NOTE — SUBJECTIVE & OBJECTIVE
Interval History: ***    Review of patient's allergies indicates:   Allergen Reactions    Codeine     Penicillins      Current Facility-Administered Medications   Medication Frequency    acetaminophen tablet 650 mg Q6H PRN    albuterol-ipratropium 2.5 mg-0.5 mg/3 mL nebulizer solution 3 mL Q4H PRN    ALPRAZolam tablet 0.5 mg BID    ALPRAZolam tablet 1 mg QHS    aluminum-magnesium hydroxide-simethicone 200-200-20 mg/5 mL suspension 30 mL Q6H PRN    aspirin chewable tablet 81 mg Daily    busPIRone tablet 5 mg TID    carvediloL tablet 25 mg BID    cyclobenzaprine tablet 10 mg TID PRN    diphenhydrAMINE capsule 25 mg Q6H PRN    enoxaparin injection 40 mg Daily    folic acid tablet 1 mg Daily    furosemide tablet 20 mg Daily    guaifenesin 100 mg/5 ml syrup 200 mg Q4H PRN    hydrALAZINE tablet 50 mg Q8H    influenza (QUADRIVALENT PF) vaccine 0.5 mL vaccine x 1 dose    labetaloL injection 10 mg Q6H PRN    magnesium oxide tablet 400 mg BID    multivitamin tablet Daily    nicotine 21 mg/24 hr 1 patch Daily    NIFEdipine 24 hr tablet 60 mg Daily    ondansetron injection 4 mg Q8H PRN    pantoprazole EC tablet 40 mg Daily    polyethylene glycol packet 17 g Daily    QUEtiapine tablet 50 mg QHS    simvastatin tablet 40 mg QHS    sodium chloride 0.9% flush 10 mL Q6H    And    sodium chloride 0.9% flush 10 mL PRN    sodium chloride tablet 1 g BID    tamsulosin 24 hr capsule 0.4 mg BID    thiamine tablet 100 mg Daily    verapamiL CR tablet 180 mg Daily       Objective:     Vital Signs (Most Recent):  Temp: 97.7 °F (36.5 °C) (05/12/20 1144)  Pulse: 95 (05/12/20 1144)  Resp: 18 (05/12/20 0914)  BP: 107/69 (05/12/20 1144)  SpO2: 99 % (05/12/20 1144)  O2 Device (Oxygen Therapy): room air (05/12/20 0839) Vital Signs (24h Range):  Temp:  [97.7 °F (36.5 °C)-98.4 °F (36.9 °C)] 97.7 °F (36.5 °C)  Pulse:  [78-99] 95  Resp:  [12-19] 18  SpO2:  [91 %-100 %] 99 %  BP: ()/() 107/69     Weight: 91.6  "kg (201 lb 15.1 oz) (05/06/20 2300)  Body mass index is 26.64 kg/m².  Body surface area is 2.17 meters squared.    I/O last 3 completed shifts:  In: 950 [P.O.:940; I.V.:10]  Out: 4640 [Urine:4640]    Physical Exam    Significant Labs:  {Select Labs:57908::"All labs within the past 24 hours have been reviewed."}     Significant Imaging:  {Results; Diagnostics:26353526::"***"}  "

## 2020-05-12 NOTE — ASSESSMENT & PLAN NOTE
5/5  Elevated  On carvedilol and verapamil  lisinopril discontinued outpt  Will resume  On tele     5/6  Discontinued lisinopril d/t elevated K  Continue carvedilol and verapamil  Added hydralazine  5/11/20 Continue current management with Coreg, hydralazine, nifedipine  - Labetolol PRN  5/12- Monitor BP and adjust meds accordingly

## 2020-05-12 NOTE — PLAN OF CARE
AAOx4  Delusional, Inappropriate conversation  PICC LUANA  Docs adjusting meds  Daily labs  Questionable dc tomorrow  Steady gait

## 2020-05-12 NOTE — ASSESSMENT & PLAN NOTE
Patient appears severely anxious.    Ativan 1 mg IV q.4 hours as needed.    5/5  Anxiety persists  Resume home meds  C/o difficulties taking deep breath and chest and body tightness and numbness  Ekg, Cxr and trops on admission wnl  On tele    5/6  Discontinued prozac and amitriptyline   Decreasing use of xanax, known to cause siadh  Limited to 0.5mg qhs  Added buspar  5/11/20, 5/12-  Continue buspar and PRN xanax.

## 2020-05-12 NOTE — ASSESSMENT & PLAN NOTE
Serum sodium 121, with chloride 88, suggesting volume depletion.  Patient clinically appears dry.  Follow-up on urine osmolality.  States that he drinks 4-5 glasses of water per day, denies excessive free water intake.  Continue normal saline at 75 cc/hour for 1 L.  BMP every 6 hr x4.  Avoid over-correction, no more than 8-10 mEq sodium correction per day.  Hold SSRI.    5/5  Stat labs  Urine osm pending  Urine Na 132  BNP wnl  tsh wnl  Continue ivf @75  No recent changes to depression meds. Holding SSRI  Restart oral xanax and d/c ativan  Avoid overcorrection of na  Seizure precautions    5/6  Worsening to 115  Likely siadh given h/o use of drugs causing siadh  ddx adrenal insufficiency given h/o rifampin  Awaiting cortisol and acth  Started salt tablets bid  Fluid restriction to 1L  D/c ivf  neph consulted   Started tolvaptan  5/11/20 The patient received tolvaptan on 5/7/20. Yesterday the patient was placed on a 1000 ml fluid restriction. The patients Na has increased to 127 today, Nephrology following. While in the ICU the patient developed some ICU delirium which is improved today. Will continue to monitor and plan to discharge once confusion resolves.   5/12- NaCl tab with oral lasix added today

## 2020-05-12 NOTE — PROGRESS NOTES
[facility] Hospital Medicine  Progress Note    Patient Name: Juwan Pozo  MRN: [id number]  Patient Class: IP- Inpatient   Admission Date: 5/4/2020  Length of Stay: 8 days  Attending Physician: Rachel Almonte MD  Primary Care Provider: Primary Doctor No        Subjective:     Principal Problem:Hyponatremia        HPI:  Mr. Pozo is a 59-year-old  female with PMH significant for hypertension, anxiety disorder, presented to the ED complaining of just not feeling well since yesterday afternoon.  Patient reports vague symptoms like anxiety, dry mouth, bilateral lower extremity numbness, chest discomfort.  Denies fever, chills.  Afebrile.  Blood blood pressure 194/92, received labetalol 10 mg IV x1, with improvement in blood pressure to 176/93.  Laboratory workup reveals sodium 121, chloride 88, CO2 20.  CT head unremarkable.  Rest of the laboratory workup unremarkable.  Chest x-ray without infiltrates, masses or effusions.  COVID 19 test negative.    Admitting diagnosis hyponatremia, likely volume depletion related.    Overview/Hospital Course:  5/5: continues to exhibit anxiety. Continues to have nonspecific c/o difficulties taking deep breath, muscle spasms and abdominal discomfort. Was taking abx for abscess. Drained by derm and started on rifampin. Reports stopping lisinopril d/t hypotension. Denies otc supplements. Reports started to workout 1-2 months ago and taking whey protein but no other supplementation. Denies any recent changes to home meds except abx.  5/6: anxiety improving. Na no improvement with NaCl tablets.  On tele. Slight increase in wbc, will obtain blood cx. Afebrile, cxr, and ct head wnl. neph consulted. tolvaptan started. Denies recent use of steroids but was on rifampin for axillary abscesses. Awaiting cortisol and acth 5/11/20 The patient received tolvaptan on 5/7/20. Yesterday the patient was placed on a 1000 ml fluid restriction. The patients Na has increased to  127 today, Nephrology following. While in the ICU the patient developed some ICU delirium which is improved today. Will continue to monitor and plan to discharge once confusion resolves.   5/12- Pt is awake , alert, oriented to self , place and person. Worked with PT today . He has walked 120 feet without any AD but with CGA for safety/balance. Requesting to resume home dose of Xanax . Feels better overall . Voiced no C/O. BP control is fair . Na 127 today . Will start NaCl tab 1000 mg bid with Lasix 20 mg po daily.     Interval History:   Pt is awake , alert, oriented to self , place and person. Worked with PT today . He has walked 120 feet without any AD but with CGA for safety/balance. Requesting to resume home dose of Xanax . Feels better overall . Voiced no C/O. BP control is fair . Na 127 today . Will start NaCl tab 1000 mg bid with Lasix 20 mg po daily.       Review of Systems   Constitutional: Positive for activity change, appetite change and fatigue. Negative for fever.   HENT: Negative for sore throat.    Eyes: Negative for visual disturbance.   Respiratory: Negative for cough, chest tightness and shortness of breath.    Cardiovascular: Positive for palpitations. Negative for chest pain and leg swelling.   Gastrointestinal: Negative for abdominal distention, abdominal pain, constipation, diarrhea, nausea and vomiting.   Endocrine: Negative for polyuria.   Genitourinary: Negative for decreased urine volume, dysuria, flank pain, frequency and hematuria.   Musculoskeletal: Negative for back pain and gait problem.   Skin: Negative for rash.   Neurological: Negative for syncope, speech difficulty, weakness, light-headedness and headaches.   Psychiatric/Behavioral: Negative for confusion, hallucinations and sleep disturbance.     Objective:     Vital Signs (Most Recent):  Temp: 97.7 °F (36.5 °C) (05/12/20 1144)  Pulse: 95 (05/12/20 1144)  Resp: 18 (05/12/20 0914)  BP: 107/69 (05/12/20 1144)  SpO2: 99 % (05/12/20  1144) Vital Signs (24h Range):  Temp:  [97.7 °F (36.5 °C)-98.4 °F (36.9 °C)] 97.7 °F (36.5 °C)  Pulse:  [78-99] 95  Resp:  [13-19] 18  SpO2:  [91 %-99 %] 99 %  BP: ()/() 107/69     Weight: 91.6 kg (201 lb 15.1 oz)  Body mass index is 26.64 kg/m².    Intake/Output Summary (Last 24 hours) at 5/12/2020 1545  Last data filed at 5/12/2020 0600  Gross per 24 hour   Intake 240 ml   Output 2950 ml   Net -2710 ml      Physical Exam   Constitutional: He is oriented to person, place, and time. He appears well-developed and well-nourished. No distress.   HENT:   Head: Normocephalic and atraumatic.   Mouth/Throat: No oropharyngeal exudate.   Eyes: Pupils are equal, round, and reactive to light. Conjunctivae and EOM are normal.   Neck: Normal range of motion. Neck supple. No JVD present. No thyromegaly present.   Cardiovascular: Normal rate, regular rhythm and normal heart sounds.   No murmur heard.  Pulmonary/Chest: Effort normal and breath sounds normal. No respiratory distress. He has no wheezes. He has no rales. He exhibits no tenderness.   Abdominal: Soft. Bowel sounds are normal. He exhibits no distension. There is no tenderness. There is no rebound and no guarding.   Musculoskeletal: Normal range of motion. He exhibits no edema.   Lymphadenopathy:     He has no cervical adenopathy.   Neurological: He is alert and oriented to person, place, and time. He displays normal reflexes. No cranial nerve deficit or sensory deficit.   Skin: Skin is warm and dry. No rash noted. He is not diaphoretic.   Psychiatric: He has a normal mood and affect.       Significant Labs:   CBC:   Recent Labs   Lab 05/11/20  0346 05/12/20  0522   WBC 12.22 11.85   HGB 11.5* 12.1*   HCT 34.6* 36.3*    241     CMP:   Recent Labs   Lab 05/11/20  0346  05/11/20  1609 05/11/20  2310 05/12/20  0522   *   < > 128* 125* 127*   K 4.1  --  4.5  --  4.1   CL 95  --  97  --  93*   CO2 22*  --  24  --  24   GLU 94  --  106  --  100   BUN  21*  --  15  --  15   CREATININE 0.9  --  0.9  --  0.8   CALCIUM 8.3*  --  9.1  --  8.7   ANIONGAP 10  --  7*  --  10   EGFRNONAA >60  --  >60  --  >60    < > = values in this interval not displayed.       Significant Imaging:       Assessment/Plan:      * Hyponatremia  Serum sodium 121, with chloride 88, suggesting volume depletion.  Patient clinically appears dry.  Follow-up on urine osmolality.  States that he drinks 4-5 glasses of water per day, denies excessive free water intake.  Continue normal saline at 75 cc/hour for 1 L.  BMP every 6 hr x4.  Avoid over-correction, no more than 8-10 mEq sodium correction per day.  Hold SSRI.    5/5  Stat labs  Urine osm pending  Urine Na 132  BNP wnl  tsh wnl  Continue ivf @75  No recent changes to depression meds. Holding SSRI  Restart oral xanax and d/c ativan  Avoid overcorrection of na  Seizure precautions    5/6  Worsening to 115  Likely siadh given h/o use of drugs causing siadh  ddx adrenal insufficiency given h/o rifampin  Awaiting cortisol and acth  Started salt tablets bid  Fluid restriction to 1L  D/c ivf  neph consulted   Started tolvaptan  5/11/20 The patient received tolvaptan on 5/7/20. Yesterday the patient was placed on a 1000 ml fluid restriction. The patients Na has increased to 127 today, Nephrology following. While in the ICU the patient developed some ICU delirium which is improved today. Will continue to monitor and plan to discharge once confusion resolves.   5/12- NaCl tab with oral lasix added today         Delirium due to multiple etiologies, acute, hyperactive  - While in the ICU the patient developed some ICU delirium which is improved today. Will continue to monitor and plan to discharge once confusion resolves.   -Resolving         Hypertension, essential  5/5  Elevated  On carvedilol and verapamil  lisinopril discontinued outpt  Will resume  On tele     5/6  Discontinued lisinopril d/t elevated K  Continue carvedilol and verapamil  Added  hydralazine  5/11/20 Continue current management with Coreg, hydralazine, nifedipine  - Labetolol PRN  5/12- Monitor BP and adjust meds accordingly     Anxiety disorder  Patient appears severely anxious.    Ativan 1 mg IV q.4 hours as needed.    5/5  Anxiety persists  Resume home meds  C/o difficulties taking deep breath and chest and body tightness and numbness  Ekg, Cxr and trops on admission wnl  On tele    5/6  Discontinued prozac and amitriptyline   Decreasing use of xanax, known to cause siadh  Limited to 0.5mg qhs  Added buspar  5/11/20, 5/12-  Continue buspar and PRN xanax.         Hypomagnesemia  - Check Mg level in am    Tobacco use  - Patient thoroughly counseled on smoking cessation, pt verbalized understanding.  - Nicotine patch        VTE Risk Mitigation (From admission, onward)         Ordered     enoxaparin injection 40 mg  Daily      05/07/20 0217     Place sequential compression device  Until discontinued      05/04/20 6095                      Rachel Almonte MD  Department of Hospital Medicine   [facility]

## 2020-05-12 NOTE — ASSESSMENT & PLAN NOTE
Likely s/t withdrawal syndrome (fluoxetine, benzo, muscle relaxer use at baseline; fluoxetine stopped abruptly s/t hyponatremia) + critical illness/ICU psychosis  ODS unlikely given waxing/waning symptomology and sodium correction remaining less than 8 per day  Cautious muscle relaxer and escalation of benzo (home xanax 1mg TID prn, will escalate inpt to 0.5mg TID prn)  Continue close monitoring of sodium correction rate  Normalize routine, encourage daytime wakefulness, and mobilize (consult PT/OT)  HS seroquel to aid sleep/wake cycle  Oral thiamine, folate, and MVI supplementation  5/11 - improving; continue current plan; will transition out of ICU setting to optimize schedule and minimize sensory overload  5/12 - continue scheduled anxiolytic, HS seroquel, and monitor

## 2020-05-12 NOTE — PT/OT/SLP PROGRESS
Physical Therapy  Treatment    Juwan Pozo   MRN: [id number]   Admitting Diagnosis: Hyponatremia    PT Received On: 05/12/20  PT Start Time: 1033     PT Stop Time: 1057    PT Total Time (min): 24 min       Billable Minutes:  Gait Training 10 min and Therapeutic Activity 14 min    Treatment Type: Treatment  PT/PTA: PT     PTA Visit Number: 0       General Precautions: Standard, fall  Orthopedic Precautions: N/A   Braces: N/A    Spiritual, Cultural Beliefs, Nondenominational Practices, Values that Affect Care: no    Subjective:  Communicated with Epic prior to session.  Patient sitting up in chair upon therapist's arrival; agreeable to participating with therapy.    Pain/Comfort  Pain Rating 1: 0/10    Objective:   Patient found with: peripheral IV, telemetry    Functional Mobility:  Bed Mobility:    NT; patient already up in chair    Transfers:   Sit <-> stand from chair with CGA and no AD.    Gait:    Gait training 120 feet with no AD with CGA for safety/balance.  Patient minimally unsteady with turns and negotiating environmental obstacles.    Balance:   Static Sit: GOOD: Takes MODERATE challenges from all directions  Dynamic Sit: GOOD: Maintains balance through MODERATE excursions of active trunk movement  Static Stand: FAIR: Maintains without assist but unable to take challenges  Dynamic stand: FAIR: Needs CONTACT GUARD during gait     Therapeutic Activities and Exercises:  Patient participated in functional transfers and gait (see above for details).  In static standing with close SBA, patient donned gown prior to ambulation.  Patient also performed seated BLE therapeutic exercises x 15 reps in all planes while demonstrating good sitting balance.     AM-PAC 6 CLICK MOBILITY  How much help from another person does this patient currently need?   1 = Unable, Total/Dependent Assistance  2 = A lot, Maximum/Moderate Assistance  3 = A little, Minimum/Contact Guard/Supervision  4 = None, Modified  [place]/Independent    Turning over in bed (including adjusting bedclothes, sheets and blankets)?: 4  Sitting down on and standing up from a chair with arms (e.g., wheelchair, bedside commode, etc.): 3  Moving from lying on back to sitting on the side of the bed?: 3  Moving to and from a bed to a chair (including a wheelchair)?: 3  Need to walk in hospital room?: 3  Climbing 3-5 steps with a railing?: 1(not assessed)  Basic Mobility Total Score: 17    AM-PAC Raw Score CMS G-Code Modifier Level of Impairment Assistance   6 % Total / Unable   7 - 9 CM 80 - 100% Maximal Assist   10 - 14 CL 60 - 80% Moderate Assist   15 - 19 CK 40 - 60% Moderate Assist   20 - 22 CJ 20 - 40% Minimal Assist   23 CI 1-20% SBA / CGA   24 CH 0% Independent/ Mod I     Patient left up in chair with all lines intact, call button in reach and avasys system present.    Assessment:  Patient progressing well with therapy.  Increasing OOB activity and ambulation distance.  Would benefit from continued skilled PT services.    Rehab identified problem list/impairments: Rehab identified problem list/impairments: weakness, impaired functional mobilty, decreased safety awareness, impaired cognition, impaired endurance, impaired self care skills, gait instability, impaired balance, decreased lower extremity function    Rehab potential is good.    Activity tolerance: Good    Discharge recommendations: Discharge Facility/Level of Care Needs: home(with assistance/supervision due to safety/cognition )     Barriers to discharge:      Equipment recommendations: Equipment Needed After Discharge: none     GOALS:   Multidisciplinary Problems     Physical Therapy Goals        Problem: Physical Therapy Goal    Goal Priority Disciplines Outcome Goal Variances Interventions   Physical Therapy Goal     PT, PT/OT Ongoing, Progressing     Description:  1. Patient will perform supine to/from sit min A  2. Patient will perform sit to/from stand min A with  least AD  3. Patient will ambulate 100ft min A least AD                    PLAN:    Patient to be seen 5 x/week  to address the above listed problems via gait training, therapeutic activities, therapeutic exercises  Plan of Care expires: 05/17/20  Plan of Care reviewed with: patient         Edilia De La Torre, PT, DPT  05/12/2020

## 2020-05-12 NOTE — ASSESSMENT & PLAN NOTE
- While in the ICU the patient developed some ICU delirium which is improved today. Will continue to monitor and plan to discharge once confusion resolves.   -Resolving

## 2020-05-12 NOTE — SUBJECTIVE & OBJECTIVE
Interval History:   Pt is awake , alert, oriented to self , place and person. Worked with PT today . He has walked 120 feet without any AD but with CGA for safety/balance. Requesting to resume home dose of Xanax . Feels better overall . Voiced no C/O. BP control is fair . Na 127 today . Will start NaCl tab 1000 mg bid with Lasix 20 mg po daily.       Review of Systems   Constitutional: Positive for activity change, appetite change and fatigue. Negative for fever.   HENT: Negative for sore throat.    Eyes: Negative for visual disturbance.   Respiratory: Negative for cough, chest tightness and shortness of breath.    Cardiovascular: Positive for palpitations. Negative for chest pain and leg swelling.   Gastrointestinal: Negative for abdominal distention, abdominal pain, constipation, diarrhea, nausea and vomiting.   Endocrine: Negative for polyuria.   Genitourinary: Negative for decreased urine volume, dysuria, flank pain, frequency and hematuria.   Musculoskeletal: Negative for back pain and gait problem.   Skin: Negative for rash.   Neurological: Negative for syncope, speech difficulty, weakness, light-headedness and headaches.   Psychiatric/Behavioral: Negative for confusion, hallucinations and sleep disturbance.     Objective:     Vital Signs (Most Recent):  Temp: 97.7 °F (36.5 °C) (05/12/20 1144)  Pulse: 95 (05/12/20 1144)  Resp: 18 (05/12/20 0914)  BP: 107/69 (05/12/20 1144)  SpO2: 99 % (05/12/20 1144) Vital Signs (24h Range):  Temp:  [97.7 °F (36.5 °C)-98.4 °F (36.9 °C)] 97.7 °F (36.5 °C)  Pulse:  [78-99] 95  Resp:  [13-19] 18  SpO2:  [91 %-99 %] 99 %  BP: ()/() 107/69     Weight: 91.6 kg (201 lb 15.1 oz)  Body mass index is 26.64 kg/m².    Intake/Output Summary (Last 24 hours) at 5/12/2020 1545  Last data filed at 5/12/2020 0600  Gross per 24 hour   Intake 240 ml   Output 2950 ml   Net -2710 ml      Physical Exam   Constitutional: He is oriented to person, place, and time. He appears well-developed  and well-nourished. No distress.   HENT:   Head: Normocephalic and atraumatic.   Mouth/Throat: No oropharyngeal exudate.   Eyes: Pupils are equal, round, and reactive to light. Conjunctivae and EOM are normal.   Neck: Normal range of motion. Neck supple. No JVD present. No thyromegaly present.   Cardiovascular: Normal rate, regular rhythm and normal heart sounds.   No murmur heard.  Pulmonary/Chest: Effort normal and breath sounds normal. No respiratory distress. He has no wheezes. He has no rales. He exhibits no tenderness.   Abdominal: Soft. Bowel sounds are normal. He exhibits no distension. There is no tenderness. There is no rebound and no guarding.   Musculoskeletal: Normal range of motion. He exhibits no edema.   Lymphadenopathy:     He has no cervical adenopathy.   Neurological: He is alert and oriented to person, place, and time. He displays normal reflexes. No cranial nerve deficit or sensory deficit.   Skin: Skin is warm and dry. No rash noted. He is not diaphoretic.   Psychiatric: He has a normal mood and affect.       Significant Labs:   CBC:   Recent Labs   Lab 05/11/20  0346 05/12/20  0522   WBC 12.22 11.85   HGB 11.5* 12.1*   HCT 34.6* 36.3*    241     CMP:   Recent Labs   Lab 05/11/20  0346  05/11/20  1609 05/11/20  2310 05/12/20  0522   *   < > 128* 125* 127*   K 4.1  --  4.5  --  4.1   CL 95  --  97  --  93*   CO2 22*  --  24  --  24   GLU 94  --  106  --  100   BUN 21*  --  15  --  15   CREATININE 0.9  --  0.9  --  0.8   CALCIUM 8.3*  --  9.1  --  8.7   ANIONGAP 10  --  7*  --  10   EGFRNONAA >60  --  >60  --  >60    < > = values in this interval not displayed.       Significant Imaging:

## 2020-05-12 NOTE — PT/OT/SLP EVAL
Occupational Therapy   Evaluation and Discharge Note    Name: Juwan Pozo  MRN: [id number]  Admitting Diagnosis:  Hyponatremia      Recommendations:     Discharge Recommendations: home  Discharge Equipment Recommendations:     Barriers to discharge:       Assessment:     Juwan Pozo is a 59 y.o. male with a medical diagnosis of Hyponatremia. At this time, patient is functioning at their prior level of function and does not require further acute OT services.     Plan:     During this hospitalization, patient does not require further acute OT services.  Please re-consult if situation changes.    · Plan of Care Reviewed with: patient   · Pt placed on people 's program    Subjective     Chief Complaint:   Patient/Family Comments/goals:     Occupational Profile:  Living Environment: lives with spouse  Previous level of function: (I) with adl's and functional mobility. Pt reports limited driving  Roles and Routines: occupational therapy  Equipment Used at home:  none  Assistance upon Discharge:     Pain/Comfort:  · Pain Rating 1: 0/10    Patients cultural, spiritual, Congregational conflicts given the current situation:      Objective:     Communicated with: nurse and epic chart review prior to session.  Patient found up in chair with telemetry, peripheral IV upon OT entry to room.    General Precautions: Standard, fall   Orthopedic Precautions:N/A   Braces: N/A     Occupational Performance:    Functional Mobility/Transfers:  · Patient completed Sit <> Stand Transfer with stand by assistance  with  no assistive device   · Functional Mobility: pt ambulated 100 feet with sba      Activities of Daily Living:  · Upper Body Dressing: stand by assistance .  · Lower Body Dressing: stand by assistance .    Cognitive/Visual Perceptual:  Cognitive/Psychosocial Skills:     -       Oriented to: Person, Place, Time and Situation   -       Follows Commands/attention:Follows multistep  commands  -       Communication: clear/fluent  -        Memory: No Deficits noted  -       Safety awareness/insight to disability: intact   Visual/Perceptual:      -Intact .    Physical Exam:  Upper Extremity Range of Motion:     -       Right Upper Extremity: WFL  -       Left Upper Extremity: WFL  Upper Extremity Strength:    -       Right Upper Extremity: WFL  -       Left Upper Extremity: WFL   Strength:    -       Right Upper Extremity: WFL  -       Left Upper Extremity: WFL    AMPAC 6 Click ADL:  AMPAC Total Score: 24    Treatment & Education:    Education:    Patient left up in chair with all lines intact, call button in reach, chair alarm on, nurse notified and theresa system    GOALS:   Multidisciplinary Problems     Occupational Therapy Goals     Not on file                History:     Past Medical History:   Diagnosis Date    Depression     High cholesterol     Hypertension        Past Surgical History:   Procedure Laterality Date    APPENDECTOMY      arm surgery      KNEE SURGERY         Time Tracking:     OT Date of Treatment: 05/12/20  OT Start Time: 0956  OT Stop Time: 1020  OT Total Time (min): 24 min    Billable Minutes:Evaluation 10 minutes  Therapeutic Activity 14 minutes    Noreen Moody OT  5/12/2020

## 2020-05-12 NOTE — PROGRESS NOTES
[facility] -   Critical Care Medicine  Progress Note    Patient Name: Juwan Pozo  MRN: [id number]  Admission Date: 5/4/2020  Hospital Length of Stay: 8 days  Code Status: Full Code  Attending Provider: Rachel Almonte MD  Primary Care Provider: Primary Doctor No   Principal Problem: Hyponatremia    Subjective:     HPI:  59 year old male with PMH including depression, anxiety, HTN, and elevated cholesterol  Presented to ED on 5/4 with complaints of substernal chest tightness, malaise, dry mouth, lower extremity numbness  Evaluation revealed elevated BP that improved with IV labetalol; sodium level 121; CT head unremarkable; CXR without infiltrates or acute findings; and COVID19 negative  Admitted to inpt floor for management of hyponatremia  Early on morning of 5/7 he transferred to ICU for close monitoring and potential initiating 3% Saline infusion after failing conservative management.     Hospital/ICU Course:  5/7 - transferred to ICU overnight; awake and alert but minimal interaction with caregivers, no verbalization; given 2nd dose talvaptan per nephrology and noted dramatic increase in urine output  5/8 - More awake and interactive today, OOB to chair with nursing assist; Na trend up overnight, peaked at 128 before initiation of D5 IVF by nephrology; this am Na trend down at 126  5/9 - resting, easily aroused this morning, conversation requires frequent stimuli; complains of malaise and myalgias, non specific along with complaint of cold breakfast and wanting to rest  5/10 - sodium peaked at 129 yesterday and slow trend down today at 125; continues to require prn meds for HTN; reported not sleeping at night, confused and hallucinating this morning   5/11 - sodium holding @ 127; awake and alert this morning, complains of some muscle weakness and intermittent tremor, confused to recent events only and no current hallucinations; RN reports slept ~5 hours last night  5/12 - Na 127; awake alert  and interacting appropriately this morning with no complications or issues overnight    Review of Systems   Constitutional: Positive for malaise/fatigue.   HENT: Negative for congestion and sinus pain.    Respiratory: Negative for shortness of breath.    Cardiovascular: Negative for chest pain.   Gastrointestinal: Negative for abdominal pain, nausea and vomiting.   Musculoskeletal: Positive for myalgias. Negative for joint pain.   Neurological: Positive for tremors and weakness. Negative for tingling and focal weakness.   Psychiatric/Behavioral: The patient is nervous/anxious. The patient does not have insomnia.          Objective:     Vital Signs (Most Recent):  Temp: 98.2 °F (36.8 °C) (05/12/20 0914)  Pulse: 99 (05/12/20 0914)  Resp: 18 (05/12/20 0914)  BP: 90/61 (05/12/20 0914)  SpO2: 95 % (05/12/20 0914) Vital Signs (24h Range):  Temp:  [97.8 °F (36.6 °C)-98.4 °F (36.9 °C)] 98.2 °F (36.8 °C)  Pulse:  [] 99  Resp:  [12-19] 18  SpO2:  [91 %-100 %] 95 %  BP: ()/() 90/61     Weight: 91.6 kg (201 lb 15.1 oz)  Body mass index is 26.64 kg/m².      Intake/Output Summary (Last 24 hours) at 5/12/2020 0915  Last data filed at 5/12/2020 0600  Gross per 24 hour   Intake 470 ml   Output 3750 ml   Net -3280 ml       Physical Exam   Constitutional: He appears well-nourished. No distress.   HENT:   Head: Atraumatic.   Eyes: Pupils are equal, round, and reactive to light. Conjunctivae are normal.   Neck: No JVD present. No tracheal deviation present.   Cardiovascular: Normal rate and regular rhythm.   Pulmonary/Chest: Effort normal and breath sounds normal.   Abdominal: Soft. Bowel sounds are normal. He exhibits no distension.   Musculoskeletal: He exhibits no edema.   Neurological: He is alert. GCS eye subscore is 4. GCS verbal subscore is 4. GCS motor subscore is 6.   Skin: Skin is warm and dry. Capillary refill takes less than 2 seconds. No cyanosis.   Residual erythema and small induration to left axilla  (treated outpatient for abscess)denies tenderness/pain   Psychiatric: His speech is normal. His mood appears not anxious. His affect is blunt. He is withdrawn. He is not agitated, not hyperactive and not actively hallucinating. Thought content is not paranoid. He does not express impulsivity.       Vents:  Oxygen Concentration (%): 21 (05/09/20 2100)    Lines/Drains/Airways     Peripherally Inserted Central Catheter Line            PICC Double Lumen 05/07/20 0110 right basilic 5 days                Significant Labs:    CBC/Anemia Profile:  Recent Labs   Lab 05/11/20  0346 05/12/20  0522   WBC 12.22 11.85   HGB 11.5* 12.1*   HCT 34.6* 36.3*    241   MCV 90 90   RDW 12.1 11.9        Chemistries:  Recent Labs   Lab 05/11/20  0346  05/11/20  1609 05/11/20  2310 05/12/20  0522   *   < > 128* 125* 127*   K 4.1  --  4.5  --  4.1   CL 95  --  97  --  93*   CO2 22*  --  24  --  24   BUN 21*  --  15  --  15   CREATININE 0.9  --  0.9  --  0.8   CALCIUM 8.3*  --  9.1  --  8.7   MG  --   --   --   --  1.9    < > = values in this interval not displayed.       All pertinent labs within the past 24 hours have been reviewed.        ABG  No results for input(s): PH, PO2, PCO2, HCO3, BE in the last 168 hours.  Assessment/Plan:     Psychiatric  Delirium due to multiple etiologies, acute, hyperactive  Likely s/t withdrawal syndrome (fluoxetine, benzo, muscle relaxer use at baseline; fluoxetine stopped abruptly s/t hyponatremia) + critical illness/ICU psychosis  ODS unlikely given waxing/waning symptomology and sodium correction remaining less than 8 per day  Cautious muscle relaxer and escalation of benzo (home xanax 1mg TID prn, will escalate inpt to 0.5mg TID prn)  Continue close monitoring of sodium correction rate  Normalize routine, encourage daytime wakefulness, and mobilize (consult PT/OT)  HS seroquel to aid sleep/wake cycle  Oral thiamine, folate, and MVI supplementation  5/11 - improving; continue current plan;  will transition out of ICU setting to optimize schedule and minimize sensory overload  5/12 - continue scheduled anxiolytic, HS seroquel, and monitor    Anxiety disorder  stopped SSRI.  Xanax adjusted to near home dose/schedule    Cardiac/Vascular  Hypertension, essential  Nifedipine, Hydralazine, Verapamil, and Carvedilol.  No ACE/ARB/Diuretic with acute hyponatremia.  5/10 - escalate nifedipine and hydralazine; continue verapamil and coreg dosing; continue ICU hemodynamic monitoring  5/11 - continue verapamil, coreg, nifedipine, and hydralazine    Renal/  * Hyponatremia  Nephrology following  Labs consistent with SIADH  Goal correction is less than or equal to 8 mmol/L  5/8 - improving after tolvaptan; discussed with nephrology, stopped D5; continue monitoring; consider transfer out of ICU in am if stable  5/9 - holding stable at 129 last 8 hours; continue monitoring  5/10 - continue monitoring  5/11 - holding; continue fluid restriction, monitoring    Hypomagnesemia  Continue oral supplementation    Other  Tobacco use  NRT  Encourage cessation  Offer resource information if amenable prior to discharge     Critical Care Daily Checklist:    A: Awake: RASS Goal/Actual Goal:    Actual: Good Agitation Sedation Scale (RASS): Restless   B: Spontaneous Breathing Trial Performed?     C: SAT & SBT Coordinated?  n/a                      D: Delirium: CAM-ICU Overall CAM-ICU: Positive   E: Early Mobility Performed? Yes   F: Feeding Goal:    Status:     Current Diet Order   Procedures    Diet Adult Regular (IDDSI Level 7) Fluid - 1000mL     Order Specific Question:   Fluid restriction:     Answer:   Fluid - 1000mL      AS: Analgesia/Sedation prn   T: Thromboembolic Prophylaxis lovenox   H: HOB > 300 Yes   U: Stress Ulcer Prophylaxis (if needed) PPI   G: Glucose Control monitoring   B: Bowel Function Stool Occurrence: 0   I: Indwelling Catheter (Lines & Ordaz) Necessity reviewed   D: De-escalation of  Antimicrobials/Pharmacotherapies reviewed    Plan for the day/ETD Transfer out to telemetry floor    Family/Goals of Care: Home on discharge   I have discussed case and plan of care in detail with Dr Mobley and Dr Mendez; Status and plan of care were discussed with team on multidisciplinary rounds.  Spouse, Marianna called with update  Care assumed by hospital medicine; we will sign off.   Please call if we can be of additional assistance.     MANNY Em-BC  Critical Care Medicine  [facility] [place]

## 2020-05-13 VITALS
RESPIRATION RATE: 20 BRPM | OXYGEN SATURATION: 98 % | DIASTOLIC BLOOD PRESSURE: 68 MMHG | HEART RATE: 90 BPM | SYSTOLIC BLOOD PRESSURE: 109 MMHG | BODY MASS INDEX: 25.31 KG/M2 | TEMPERATURE: 98 F | WEIGHT: 190.94 LBS | HEIGHT: 73 IN

## 2020-05-13 PROBLEM — F05 DELIRIUM DUE TO MULTIPLE ETIOLOGIES, ACUTE, HYPERACTIVE: Status: RESOLVED | Noted: 2020-05-09 | Resolved: 2020-05-13

## 2020-05-13 PROBLEM — E83.42 HYPOMAGNESEMIA: Status: RESOLVED | Noted: 2020-05-06 | Resolved: 2020-05-13

## 2020-05-13 LAB
ALBUMIN SERPL BCP-MCNC: 3.5 G/DL (ref 3.5–5.2)
ALP SERPL-CCNC: 66 U/L (ref 55–135)
ALT SERPL W/O P-5'-P-CCNC: 29 U/L (ref 10–44)
ANION GAP SERPL CALC-SCNC: 9 MMOL/L (ref 8–16)
AST SERPL-CCNC: 20 U/L (ref 10–40)
BASOPHILS # BLD AUTO: 0.07 K/UL (ref 0–0.2)
BASOPHILS NFR BLD: 0.6 % (ref 0–1.9)
BILIRUB SERPL-MCNC: 0.4 MG/DL (ref 0.1–1)
BUN SERPL-MCNC: 14 MG/DL (ref 6–20)
CALCIUM SERPL-MCNC: 9 MG/DL (ref 8.7–10.5)
CHLORIDE SERPL-SCNC: 94 MMOL/L (ref 95–110)
CO2 SERPL-SCNC: 25 MMOL/L (ref 23–29)
CREAT SERPL-MCNC: 0.8 MG/DL (ref 0.5–1.4)
DIFFERENTIAL METHOD: ABNORMAL
EOSINOPHIL # BLD AUTO: 0.3 K/UL (ref 0–0.5)
EOSINOPHIL NFR BLD: 2.5 % (ref 0–8)
ERYTHROCYTE [DISTWIDTH] IN BLOOD BY AUTOMATED COUNT: 11.9 % (ref 11.5–14.5)
EST. GFR  (AFRICAN AMERICAN): >60 ML/MIN/1.73 M^2
EST. GFR  (NON AFRICAN AMERICAN): >60 ML/MIN/1.73 M^2
GLUCOSE SERPL-MCNC: 108 MG/DL (ref 70–110)
HCT VFR BLD AUTO: 34.2 % (ref 40–54)
HGB BLD-MCNC: 11.4 G/DL (ref 14–18)
IMM GRANULOCYTES # BLD AUTO: 0.05 K/UL (ref 0–0.04)
IMM GRANULOCYTES NFR BLD AUTO: 0.4 % (ref 0–0.5)
LYMPHOCYTES # BLD AUTO: 1.8 K/UL (ref 1–4.8)
LYMPHOCYTES NFR BLD: 14.5 % (ref 18–48)
MAGNESIUM SERPL-MCNC: 1.9 MG/DL (ref 1.6–2.6)
MCH RBC QN AUTO: 29.5 PG (ref 27–31)
MCHC RBC AUTO-ENTMCNC: 33.3 G/DL (ref 32–36)
MCV RBC AUTO: 89 FL (ref 82–98)
MONOCYTES # BLD AUTO: 1.4 K/UL (ref 0.3–1)
MONOCYTES NFR BLD: 10.9 % (ref 4–15)
NEUTROPHILS # BLD AUTO: 8.9 K/UL (ref 1.8–7.7)
NEUTROPHILS NFR BLD: 71.1 % (ref 38–73)
NRBC BLD-RTO: 0 /100 WBC
PLATELET # BLD AUTO: 254 K/UL (ref 150–350)
PMV BLD AUTO: 8.9 FL (ref 9.2–12.9)
POTASSIUM SERPL-SCNC: 4 MMOL/L (ref 3.5–5.1)
PROT SERPL-MCNC: 6.4 G/DL (ref 6–8.4)
RBC # BLD AUTO: 3.86 M/UL (ref 4.6–6.2)
SODIUM SERPL-SCNC: 128 MMOL/L (ref 136–145)
WBC # BLD AUTO: 12.44 K/UL (ref 3.9–12.7)

## 2020-05-13 PROCEDURE — A4216 STERILE WATER/SALINE, 10 ML: HCPCS | Performed by: INTERNAL MEDICINE

## 2020-05-13 PROCEDURE — 80053 COMPREHEN METABOLIC PANEL: CPT

## 2020-05-13 PROCEDURE — 25000003 PHARM REV CODE 250: Performed by: NURSE PRACTITIONER

## 2020-05-13 PROCEDURE — S4991 NICOTINE PATCH NONLEGEND: HCPCS | Performed by: INTERNAL MEDICINE

## 2020-05-13 PROCEDURE — 25000003 PHARM REV CODE 250: Performed by: INTERNAL MEDICINE

## 2020-05-13 PROCEDURE — 97116 GAIT TRAINING THERAPY: CPT

## 2020-05-13 PROCEDURE — 97802 MEDICAL NUTRITION INDIV IN: CPT

## 2020-05-13 PROCEDURE — 83735 ASSAY OF MAGNESIUM: CPT

## 2020-05-13 PROCEDURE — 25000003 PHARM REV CODE 250: Performed by: EMERGENCY MEDICINE

## 2020-05-13 PROCEDURE — 97110 THERAPEUTIC EXERCISES: CPT

## 2020-05-13 PROCEDURE — 85025 COMPLETE CBC W/AUTO DIFF WBC: CPT

## 2020-05-13 RX ORDER — ATORVASTATIN CALCIUM 20 MG/1
20 TABLET, FILM COATED ORAL NIGHTLY
Qty: 30 TABLET | Refills: 0 | Status: SHIPPED | OUTPATIENT
Start: 2020-05-13 | End: 2020-06-12

## 2020-05-13 RX ORDER — IBUPROFEN 200 MG
1 TABLET ORAL DAILY
Qty: 7 PATCH | Refills: 0 | COMMUNITY
Start: 2020-05-13 | End: 2020-05-20

## 2020-05-13 RX ORDER — BUSPIRONE HYDROCHLORIDE 7.5 MG/1
TABLET ORAL
Qty: 45 TABLET | Refills: 0 | Status: SHIPPED | OUTPATIENT
Start: 2020-05-13

## 2020-05-13 RX ORDER — QUETIAPINE FUMARATE 50 MG/1
50 TABLET, FILM COATED ORAL NIGHTLY
Qty: 30 TABLET | Refills: 0 | Status: SHIPPED | OUTPATIENT
Start: 2020-05-13 | End: 2020-06-12

## 2020-05-13 RX ADMIN — BUSPIRONE HYDROCHLORIDE 5 MG: 5 TABLET ORAL at 03:05

## 2020-05-13 RX ADMIN — ALPRAZOLAM 0.5 MG: 0.5 TABLET ORAL at 05:05

## 2020-05-13 RX ADMIN — ALPRAZOLAM 0.5 MG: 0.5 TABLET ORAL at 03:05

## 2020-05-13 RX ADMIN — Medication 10 ML: at 06:05

## 2020-05-13 RX ADMIN — VERAPAMIL HYDROCHLORIDE 180 MG: 180 TABLET, FILM COATED, EXTENDED RELEASE ORAL at 09:05

## 2020-05-13 RX ADMIN — Medication 1 PATCH: at 09:05

## 2020-05-13 RX ADMIN — Medication 400 MG: at 09:05

## 2020-05-13 RX ADMIN — PANTOPRAZOLE SODIUM 40 MG: 40 TABLET, DELAYED RELEASE ORAL at 09:05

## 2020-05-13 RX ADMIN — BUSPIRONE HYDROCHLORIDE 5 MG: 5 TABLET ORAL at 09:05

## 2020-05-13 RX ADMIN — FUROSEMIDE 20 MG: 20 TABLET ORAL at 09:05

## 2020-05-13 RX ADMIN — NIFEDIPINE 60 MG: 30 TABLET, FILM COATED, EXTENDED RELEASE ORAL at 09:05

## 2020-05-13 RX ADMIN — FOLIC ACID 1 MG: 1 TABLET ORAL at 09:05

## 2020-05-13 RX ADMIN — Medication 100 MG: at 09:05

## 2020-05-13 RX ADMIN — CARVEDILOL 25 MG: 12.5 TABLET, FILM COATED ORAL at 09:05

## 2020-05-13 RX ADMIN — TAMSULOSIN HYDROCHLORIDE 0.4 MG: 0.4 CAPSULE ORAL at 09:05

## 2020-05-13 RX ADMIN — Medication 10 ML: at 12:05

## 2020-05-13 RX ADMIN — POLYETHYLENE GLYCOL 3350 17 G: 17 POWDER, FOR SOLUTION ORAL at 09:05

## 2020-05-13 RX ADMIN — ASPIRIN 81 MG 81 MG: 81 TABLET ORAL at 09:05

## 2020-05-13 RX ADMIN — THERA TABS 1 TABLET: TAB at 09:05

## 2020-05-13 RX ADMIN — Medication 1 G: at 09:05

## 2020-05-13 NOTE — PLAN OF CARE
Recommendations    Recommendation:  1. Continue Regular Diet with 1000 mL fluid restriction.   2. Weekly weights.   3. RD to f/u  Goals: % of energy needs by f/u  Nutrition Goal Status: new  Communication of RD Recs: POC

## 2020-05-13 NOTE — PROGRESS NOTES
"[facility] -   Adult Nutrition  Progress Note    SUMMARY       Recommendations    Recommendation:  1. Continue Regular Diet with 1000 mL fluid restriction.   2. Weekly weights.   3. RD to f/u  Goals: % of energy needs by f/u  Nutrition Goal Status: new  Communication of RD Recs: POC    Reason for Assessment    Reason For Assessment: length of stay  Diagnosis: Hyponatremia, Delrium, Hypomagnesemia  Relevant Medical History: Anxiety d/o, HTN, Tobacco use   General Information Comments:  20  Patient reports he has not has maintained a good appetite prior to admit and during admit. Reports PO intake of 100% of meals. Patient reports UBW of 190-200 lbs. Took a weight with standing scale of 190 lbs. No evidence of muscle wasting or fat loss.  Reports lifting weights more frequently in the last 3 months.   Nutrition Discharge Planning: Regular Diet    Nutrition Risk Screen    Nutrition Risk Screen: no indicators present    Nutrition/Diet History    Spiritual, Cultural Beliefs, [facility] Practices, Values that Affect Care: no    Anthropometrics    Temp: 98.1 °F (36.7 °C)  Height: 6' 1" (185.4 cm)  Height (inches): 73 in  Weight Method: Standard Scale  Weight: 86.6 kg (190 lb 14.7 oz)  Weight (lb): 190.92 lb  Ideal Body Weight (IBW), Male: 184 lb  % Ideal Body Weight, Male (lb): 102.57 %  BMI (Calculated): 25.2  BMI Grade: 25 - 29.9 - overweight  Usual Body Weight (UBW), k-200 lbs  Weight Change Amount: 10 lbs       Lab/Procedures/Meds    Pertinent Labs Reviewed: reviewed    BMP  Lab Results   Component Value Date     (L) 2020    K 4.0 2020    CL 94 (L) 2020    CO2 25 2020    BUN 14 2020    CREATININE 0.8 2020    CALCIUM 9.0 2020    ANIONGAP 9 2020    ESTGFRAFRICA >60 2020    EGFRNONAA >60 2020     Lab Results   Component Value Date    CALCIUM 9.0 2020    PHOS 2.8 2020     Lab Results   Component Value Date    ALBUMIN " 3.5 05/13/2020       Pertinent Medications Reviewed:  Folic acid, Mag Oxide, MVI, Thiamine      Estimated/Assessed Needs    Weight Used For Calorie Calculations: 86.6 kg (190 lb 14.7 oz)  Energy Calorie Requirements (kcal): 2093  Protein Requirements: 70-87  Weight Used For Protein Calculations: 86.6 kg (190 lb 14.7 oz)  Estimated Fluid Requirement Method: RDA Method, other (see comments)  RDA Method (mL): 2093     Nutrition Prescription Ordered    Current Diet Order: Regular, 1000 mL fluid    Evaluation of Received Nutrient/Fluid Intake       % Intake of Estimated Energy Needs: 75 - 100 %  % Meal Intake: 75 - 100 %    Intake/Output Summary (Last 24 hours) at 5/13/2020 1606  Last data filed at 5/13/2020 0500  Gross per 24 hour   Intake 1082 ml   Output 625 ml   Net 457 ml       Nutrition Risk    Level of Risk/Frequency of Follow-up: low / 1x/week    Assessment and Plan   Nutrition Problem  Altered Nutrition-Related Laboratory Value    Related to (etiology):   Metabolic disorder- volume depletion     Signs and Symptoms (as evidenced by):   Other findings of acute or choric disorders that are abnormal and of nutritional origin or consequence- hyponatremia     Interventions (treatment strategy):  Food/Nutrient Delivery- fluid restricted diet    Nutrition Diagnosis Status:   New        Monitor and Evaluation    Food and Nutrient Intake: energy intake, food and beverage intake  Anthropometric Measurements: weight  Biochemical Data, Medical Tests and Procedures: electrolyte and renal panel, gastrointestinal profile, glucose/endocrine profile  Nutrition-Focused Physical Findings: overall appearance     Malnutrition Assessment   Not indicated       Nutrition Follow-Up    RD Follow-up?: Yes     Holly Levy, RD, LDN

## 2020-05-13 NOTE — PLAN OF CARE
Problem: Adult Inpatient Plan of Care  Goal: Plan of Care Review  Outcome: Ongoing, Progressing     Problem: Adult Inpatient Plan of Care  Goal: Readiness for Transition of Care  Outcome: Ongoing, Progressing     Problem: Fall Injury Risk  Goal: Absence of Fall and Fall-Related Injury  Outcome: Ongoing, Progressing     Problem: Anxiety  Goal: Anxiety Reduction or Resolution  Outcome: Ongoing, Progressing     Problem: Electrolyte Imbalance  Goal: Electrolyte Balance  Outcome: Ongoing, Progressing  ·  Aox4.  · Reviewed POC with patient regarding med administration, hourly rounding, and infrequent needs.  · Able to verbalize understanding, needs & follow commands. .  · VS WNL. Tele-monitoring continues.  · Respirations even/unlabored. O2 NC. BS clear/equal bilaterally.  · ABD soft & non-tender. Continent of b/b. Urinal @ bedside.  · Moderate assistance required w/ ADLs.   · Ambulates  w/stand-by assist.  Able  to reposition self in bed. Tolerates movement/repositioning well. Educated on importance of changing position often. Verbalized understanding.  · PICC line to RUE; Saline locked. No s/s of infection noted to site.  · Denies pain to;  · NADN @ this time. Resting quietly in bed reposition  · Hourly rounding complete.   · All safety measures in place. Free of falls. SR up x2; bed low & locked. Call light w/in reach.  · Will continue to monitor throughout shift.

## 2020-05-13 NOTE — CHAPLAIN
Follow up visit with patient.  Provided support through listening and presence.  Pt was frustrated at the time of my visit because he is ready to be discharged and was tired of waiting.  I took time to listen to those frustrations and left him an encouraging note written by a group from the community. I will follow up as needed.    Chaplain Samir De Leon M.Div., [facility]

## 2020-05-13 NOTE — PLAN OF CARE
05/13/20 0820   Discharge Reassessment   Assessment Type Discharge Planning Reassessment   Provided patient/caregiver education on the expected discharge date and the discharge plan No   Do you have any problems affording any of your prescribed medications? TBD   Discharge Plan A Home with family   DME Needed Upon Discharge  none   Patient choice form signed by patient/caregiver N/A   Anticipated Discharge Disposition Home   Can the patient/caregiver answer the patient profile reliably? Yes, cognitively intact   Describe the patient's ability to walk at the present time. Minor restrictions or changes

## 2020-05-13 NOTE — PLAN OF CARE
05/13/20 1609   Final Note   Assessment Type Final Discharge Note   Anticipated Discharge Disposition Home   Right Care Referral Info   Post Acute Recommendation No Care

## 2020-05-13 NOTE — PT/OT/SLP PROGRESS
Physical Therapy  Treatment    Juwan Pozo   MRN: [id number]   Admitting Diagnosis: Hyponatremia    PT Received On: 05/13/20  PT Start Time: 1040     PT Stop Time: 1105    PT Total Time (min): 25 min       Billable Minutes:  Gait Training 15 and Therapeutic Exercise 10    Treatment Type: Treatment  PT/PTA: PT         General Precautions: Standard  Orthopedic Precautions: N/A   Braces: N/A    Subjective:  Communicated with NURSE GORDON prior to session.  Pain/Comfort  Pain Rating 1: 0/10    Objective:   Patient found with: telemetry, peripheral IV    Functional Mobility:  Therapeutic Activities and Exercises:  PT FOUND SUPINE IN BED UPON ARRIVAL, SUP>SIT AMARILYS, PT EDUCATED IN AND PERFORMED BLE THEREX X 20 REPS AROM WITH REST, SIT>STAND AMARILYS, PT ' NO AD WITH SPV, NO LOB OR SOB ON ROOM AIR, GOOD DYNAMIC BALANCE, PT RETURN TO ROOM TO BEDSIDE CHAIR AMARILYS, ALL NEEDS MET    AM-PAC 6 CLICK MOBILITY  How much help from another person does this patient currently need?   1 = Unable, Total/Dependent Assistance  2 = A lot, Maximum/Moderate Assistance  3 = A little, Minimum/Contact Guard/Supervision  4 = None, Modified [place]/Independent    Turning over in bed (including adjusting bedclothes, sheets and blankets)?: 4  Sitting down on and standing up from a chair with arms (e.g., wheelchair, bedside commode, etc.): 4  Moving from lying on back to sitting on the side of the bed?: 4  Moving to and from a bed to a chair (including a wheelchair)?: 4  Need to walk in hospital room?: 4  Climbing 3-5 steps with a railing?: 1  Basic Mobility Total Score: 21    AM-PAC Raw Score CMS G-Code Modifier Level of Impairment Assistance   6 % Total / Unable   7 - 9 CM 80 - 100% Maximal Assist   10 - 14 CL 60 - 80% Moderate Assist   15 - 19 CK 40 - 60% Moderate Assist   20 - 22 CJ 20 - 40% Minimal Assist   23 CI 1-20% SBA / CGA   24 CH 0% Independent/ Mod I     Patient left up in chair with all lines intact, call button in reach,  chair alarm on and NURSE notified.    Assessment:  Juwan Pozo is a 59 y.o. male with a medical diagnosis of Hyponatremia   PT IS NO LONGER A CANDIDATE FOR INPATIENT SKILLED P.T. DUE TO HIGH LEVEL OF FUNCTION, PT WILL BENEFIT FROM [facility] FOR CONT. GAIT/TE    Rehab identified problem list/impairments:     Rehab potential is .    Activity tolerance:     Discharge recommendations: Discharge Facility/Level of Care Needs: home     Barriers to discharge:      Equipment recommendations: Equipment Needed After Discharge: none     GOALS:   Multidisciplinary Problems     Physical Therapy Goals     Not on file          Multidisciplinary Problems (Resolved)        Problem: Physical Therapy Goal    Goal Priority Disciplines Outcome Goal Variances Interventions   Physical Therapy Goal   (Resolved)     PT, PT/OT Met     Description:  1. Patient will perform supine to/from sit min A  2. Patient will perform sit to/from stand min A with least AD  3. Patient will ambulate 100ft min A least AD                    PLAN:    D/C PT FROM P[facility] TO [facility]    Lian Hinton, PT  05/13/2020

## 2020-05-15 NOTE — DISCHARGE SUMMARY
[facility] Hospital Medicine  Discharge Summary      Patient Name: Juwan Pozo  MRN: [id number]  Admission Date: 5/4/2020  Hospital Length of Stay: 9 days  Discharge Date and Time:  05/14/2020 11:43 PM  Attending Physician: No att. providers found   Discharging Provider: Rachel Almonte MD  Primary Care Provider: Primary Doctor Charo      HPI:   Mr. Pozo is a 59-year-old  female with PMH significant for hypertension, anxiety disorder, presented to the ED complaining of just not feeling well since yesterday afternoon.  Patient reports vague symptoms like anxiety, dry mouth, bilateral lower extremity numbness, chest discomfort.  Denies fever, chills.  Afebrile.  Blood blood pressure 194/92, received labetalol 10 mg IV x1, with improvement in blood pressure to 176/93.  Laboratory workup reveals sodium 121, chloride 88, CO2 20.  CT head unremarkable.  Rest of the laboratory workup unremarkable.  Chest x-ray without infiltrates, masses or effusions.  COVID 19 test negative.    Admitting diagnosis hyponatremia, likely volume depletion related.    * No surgery found *      Hospital Course:   5/5: continues to exhibit anxiety. Continues to have nonspecific c/o difficulties taking deep breath, muscle spasms and abdominal discomfort. Was taking abx for abscess. Drained by derm and started on rifampin. Reports stopping lisinopril d/t hypotension. Denies otc supplements. Reports started to workout 1-2 months ago and taking whey protein but no other supplementation. Denies any recent changes to home meds except abx.  5/6: anxiety improving. Na no improvement with NaCl tablets.  On tele. Slight increase in wbc, will obtain blood cx. Afebrile, cxr, and ct head wnl. neph consulted. tolvaptan started. Denies recent use of steroids but was on rifampin for axillary abscesses. Awaiting cortisol and acth 5/11/20 The patient received tolvaptan on 5/7/20. Yesterday the patient was placed on a 1000 ml fluid  restriction. The patients Na has increased to 127 today, Nephrology following. While in the ICU the patient developed some ICU delirium which is improved today. Will continue to monitor and plan to discharge once confusion resolves.   5/12- Pt is awake , alert, oriented to self , place and person. Worked with PT today . He has walked 120 feet without any AD but with CGA for safety/balance. Requesting to resume home dose of Xanax . Feels better overall . Voiced no C/O. BP control is fair . Na 127 today . Will start NaCl tab 1000 mg bid with Lasix 20 mg po daily.   5/13- Pt continues to feel better , voiced no c/o today . Vitals remains stable . Serum sodium is 128 today . Prozac and Amitriptyline are being held since admission . Pt started on Buspar and Seroquel and has been tolerating without adverse effects . Placed back on Xanax . Continued on NaCl tab along with 1 liter /day fluid restriction . Due to overall clinical improvement and stability, decision is made to discharge pt home with close follow up with PCP . Pt was examined before discharge and found stable and suitable for discharge .      Consults:   Consults (From admission, onward)        Status Ordering Provider     Inpatient consult to Nephrology  Once     Provider:  Rey Benitez MD    Completed KASEY YU          No new Assessment & Plan notes have been filed under this hospital service since the last note was generated.  Service: Hospital Medicine    Final Active Diagnoses:    Diagnosis Date Noted POA    PRINCIPAL PROBLEM:  Hyponatremia [E87.1] 05/04/2020 Yes    Hypertension, essential [I10] 05/05/2020 Yes    Anxiety disorder [F41.9] 05/04/2020 Yes    Tobacco use [Z72.0] 05/04/2020 Yes      Problems Resolved During this Admission:    Diagnosis Date Noted Date Resolved POA    Delirium due to multiple etiologies, acute, hyperactive [F05] 05/09/2020 05/13/2020 Yes    Hypomagnesemia [E83.42] 05/06/2020 05/13/2020 Yes    Leukocytosis  "[D72.829] 05/06/2020 05/11/2020 No       Discharged Condition: stable    Disposition: Home or Self Care    Follow Up:  Follow-up Information     Primary Doctor No.    Why:  Have pt see his PCP in 3 days for discharge follow up and to draw lab for serum sodium level                Patient Instructions:      CANE FOR HOME USE     Order Specific Question Answer Comments   Type of Cane: Straight    Height: 6' 1" (1.854 m)    Weight: 78.4 kg (172 lb 13.5 oz)    Does patient have medical equipment at home? none    Length of need (1-99 months): 99    Please check all that apply: Patient is unable to safely ambulate without equipment.    Vendor: [facility]    Expected Date of Delivery: 5/13/2020      Diet Adult Regular   Order Comments: Fluid restriction 1 liter /day     Activity as tolerated       Significant Diagnostic Studies: Labs:   BMP:   Recent Labs   Lab 05/13/20  0400      *   K 4.0   CL 94*   CO2 25   BUN 14   CREATININE 0.8   CALCIUM 9.0   MG 1.9   , CMP   Recent Labs   Lab 05/13/20  0400   *   K 4.0   CL 94*   CO2 25      BUN 14   CREATININE 0.8   CALCIUM 9.0   PROT 6.4   ALBUMIN 3.5   BILITOT 0.4   ALKPHOS 66   AST 20   ALT 29   ANIONGAP 9   ESTGFRAFRICA >60   EGFRNONAA >60    and CBC   Recent Labs   Lab 05/13/20  0400   WBC 12.44   HGB 11.4*   HCT 34.2*          Pending Diagnostic Studies:     Procedure Component Value Units Date/Time    Sodium (Serum) [[id number]] Collected:  05/06/20 2002    Order Status:  Sent Lab Status:  In process Updated:  05/06/20 2002    Specimen:  Blood     Vitamin B12 Deficiency Panel [[id number]] Collected:  05/08/20 0349    Order Status:  Sent Lab Status:  In process Updated:  05/08/20 0350    Specimen:  Blood          Medications:  Reconciled Home Medications:      Medication List      START taking these medications    atorvastatin 20 MG tablet  Commonly known as:  LIPITOR  Take 1 tablet (20 mg total) by mouth every evening. For high cholesterol   "   busPIRone 7.5 MG tablet  Commonly known as:  BUSPAR  Take 1 tablet by mouth  3x daily for anxiety     nicotine 14 mg/24 hr  Commonly known as:  NICODERM CQ  Place 1 patch onto the skin once daily. for 7 days     QUEtiapine 50 MG tablet  Commonly known as:  SEROQUEL  Take 1 tablet (50 mg total) by mouth every evening.     sodium chloride 1 gram tablet  Take 1 tablet (1 g total) by mouth once daily. X 5 days , then as needed        CONTINUE taking these medications    ALPRAZolam 1 MG tablet  Commonly known as:  XANAX  Take 1 mg by mouth 3 (three) times daily as needed for Anxiety.     aspirin 81 MG Chew  Take 81 mg by mouth once daily.     carvediloL 25 MG tablet  Commonly known as:  COREG  Take 25 mg by mouth 2 (two) times daily with meals.     omeprazole 20 MG capsule  Commonly known as:  PRILOSEC  Take 20 mg by mouth once daily.     verapamiL 180 MG C24p  Commonly known as:  VERELAN  Take 180 mg by mouth once daily.        STOP taking these medications    amitriptyline 25 MG tablet  Commonly known as:  ELAVIL     cyclobenzaprine 10 MG tablet  Commonly known as:  FLEXERIL     FLUoxetine 20 MG capsule     rifAMpin 300 MG capsule  Commonly known as:  RIFADIN     simvastatin 40 MG tablet  Commonly known as:  ZOCOR     sulfamethoxazole-trimethoprim 800-160mg 800-160 mg Tab  Commonly known as:  BACTRIM DS            Indwelling Lines/Drains at time of discharge:   Lines/Drains/Airways     None                 Time spent on the discharge of patient: 40  minutes  Patient was seen and examined on the date of discharge and determined to be suitable for discharge.         Rachel Almonte MD  Department of Hospital Medicine  [facility]